# Patient Record
Sex: FEMALE | Race: WHITE | NOT HISPANIC OR LATINO | Employment: UNEMPLOYED | URBAN - NONMETROPOLITAN AREA
[De-identification: names, ages, dates, MRNs, and addresses within clinical notes are randomized per-mention and may not be internally consistent; named-entity substitution may affect disease eponyms.]

---

## 2022-11-29 ENCOUNTER — OFFICE VISIT (OUTPATIENT)
Dept: FAMILY MEDICINE CLINIC | Facility: CLINIC | Age: 62
End: 2022-11-29

## 2022-11-29 VITALS
OXYGEN SATURATION: 98 % | RESPIRATION RATE: 16 BRPM | WEIGHT: 235.6 LBS | DIASTOLIC BLOOD PRESSURE: 98 MMHG | HEIGHT: 61 IN | TEMPERATURE: 96.6 F | SYSTOLIC BLOOD PRESSURE: 154 MMHG | HEART RATE: 105 BPM | BODY MASS INDEX: 44.48 KG/M2

## 2022-11-29 DIAGNOSIS — Z00.01 ENCOUNTER FOR GENERAL ADULT MEDICAL EXAMINATION WITH ABNORMAL FINDINGS: ICD-10-CM

## 2022-11-29 DIAGNOSIS — E55.9 VITAMIN D DEFICIENCY: ICD-10-CM

## 2022-11-29 DIAGNOSIS — E78.2 MIXED HYPERLIPIDEMIA: ICD-10-CM

## 2022-11-29 DIAGNOSIS — R76.8 ELEVATED ANTINUCLEAR ANTIBODY (ANA) LEVEL: ICD-10-CM

## 2022-11-29 DIAGNOSIS — E11.65 TYPE 2 DIABETES MELLITUS WITH HYPERGLYCEMIA, WITHOUT LONG-TERM CURRENT USE OF INSULIN: Primary | ICD-10-CM

## 2022-11-29 DIAGNOSIS — E66.01 CLASS 3 SEVERE OBESITY DUE TO EXCESS CALORIES WITH SERIOUS COMORBIDITY AND BODY MASS INDEX (BMI) OF 40.0 TO 44.9 IN ADULT: ICD-10-CM

## 2022-11-29 DIAGNOSIS — M13.0 POLYARTHRITIS: ICD-10-CM

## 2022-11-29 DIAGNOSIS — Z12.31 ENCOUNTER FOR SCREENING MAMMOGRAM FOR MALIGNANT NEOPLASM OF BREAST: ICD-10-CM

## 2022-11-29 DIAGNOSIS — I10 PRIMARY HYPERTENSION: ICD-10-CM

## 2022-11-29 DIAGNOSIS — Z12.11 ENCOUNTER FOR SCREENING FOR MALIGNANT NEOPLASM OF COLON: ICD-10-CM

## 2022-11-29 PROBLEM — E11.21 DIABETIC NEPHROPATHY: Status: ACTIVE | Noted: 2020-12-16

## 2022-11-29 LAB
EXPIRATION DATE: NORMAL
HBA1C MFR BLD: 12.6 %
Lab: NORMAL

## 2022-11-29 PROCEDURE — 83036 HEMOGLOBIN GLYCOSYLATED A1C: CPT | Performed by: NURSE PRACTITIONER

## 2022-11-29 PROCEDURE — 99386 PREV VISIT NEW AGE 40-64: CPT | Performed by: NURSE PRACTITIONER

## 2022-11-29 RX ORDER — LOSARTAN POTASSIUM 25 MG/1
TABLET ORAL
COMMUNITY
Start: 2020-12-04 | End: 2022-11-29 | Stop reason: SDUPTHER

## 2022-11-29 RX ORDER — SIMVASTATIN 10 MG
TABLET ORAL
COMMUNITY
Start: 2020-12-04 | End: 2022-11-29 | Stop reason: SDUPTHER

## 2022-11-29 RX ORDER — LOSARTAN POTASSIUM 25 MG/1
25 TABLET ORAL DAILY
Qty: 30 TABLET | Refills: 2 | Status: SHIPPED | OUTPATIENT
Start: 2022-11-29 | End: 2022-12-21

## 2022-11-29 RX ORDER — SIMVASTATIN 10 MG
10 TABLET ORAL NIGHTLY
Qty: 30 TABLET | Refills: 2 | Status: SHIPPED | OUTPATIENT
Start: 2022-11-29 | End: 2022-12-01

## 2022-11-29 NOTE — PATIENT INSTRUCTIONS
Preventive Care 40-64 Years Old, Female  Preventive care refers to lifestyle choices and visits with your health care provider that can promote health and wellness. Preventive care visits are also called wellness exams.  What can I expect for my preventive care visit?  Counseling  Your health care provider may ask you questions about your:  Medical history, including:  Past medical problems.  Family medical history.  Pregnancy history.  Current health, including:  Menstrual cycle.  Method of birth control.  Emotional well-being.  Home life and relationship well-being.  Sexual activity and sexual health.  Lifestyle, including:  Alcohol, nicotine or tobacco, and drug use.  Access to firearms.  Diet, exercise, and sleep habits.  Work and work environment.  Sunscreen use.  Safety issues such as seatbelt and bike helmet use.  Physical exam  Your health care provider will check your:  Height and weight. These may be used to calculate your BMI (body mass index). BMI is a measurement that tells if you are at a healthy weight.  Waist circumference. This measures the distance around your waistline. This measurement also tells if you are at a healthy weight and may help predict your risk of certain diseases, such as type 2 diabetes and high blood pressure.  Heart rate and blood pressure.  Body temperature.  Skin for abnormal spots.  What immunizations do I need?  A person receiving a vaccination in the upper arm.    Vaccines are usually given at various ages, according to a schedule. Your health care provider will recommend vaccines for you based on your age, medical history, and lifestyle or other factors, such as travel or where you work.  What tests do I need?  Screening  Your health care provider may recommend screening tests for certain conditions. This may include:  Lipid and cholesterol levels.  Diabetes screening. This is done by checking your blood sugar (glucose) after you have not eaten for a while  (fasting).  Pelvic exam and Pap test.  Hepatitis B test.  Hepatitis C test.  HIV (human immunodeficiency virus) test.  STI (sexually transmitted infection) testing, if you are at risk.  Lung cancer screening.  Colorectal cancer screening.  Mammogram. Talk with your health care provider about when you should start having regular mammograms. This may depend on whether you have a family history of breast cancer.  BRCA-related cancer screening. This may be done if you have a family history of breast, ovarian, tubal, or peritoneal cancers.  Bone density scan. This is done to screen for osteoporosis.  Talk with your health care provider about your test results, treatment options, and if necessary, the need for more tests.  Follow these instructions at home:  Eating and drinking  A plate with healthy, colorful foods.    Eat a diet that includes fresh fruits and vegetables, whole grains, lean protein, and low-fat dairy products.  Take vitamin and mineral supplements as recommended by your health care provider.  Do not drink alcohol if:  Your health care provider tells you not to drink.  You are pregnant, may be pregnant, or are planning to become pregnant.  If you drink alcohol:  Limit how much you have to 0-1 drink a day.  Know how much alcohol is in your drink. In the [place] one drink equals one 12 oz bottle of beer (355 mL), one 5 oz glass of wine (148 mL), or one 1½ oz glass of hard liquor (44 mL).  Lifestyle  Brush your teeth every morning and night with fluoride toothpaste. Floss one time each day.  Exercise for at least 30 minutes 5 or more days each week.  Do not use any products that contain nicotine or tobacco. These products include cigarettes, chewing tobacco, and vaping devices, such as e-cigarettes. If you need help quitting, ask your health care provider.  Do not use drugs.  If you are sexually active, practice safe sex. Use a condom or other form of protection to prevent STIs.  If you do not wish to become  pregnant, use a form of birth control. If you plan to become pregnant, see your health care provider for a prepregnancy visit.  Take aspirin only as told by your health care provider. Make sure that you understand how much to take and what form to take. Work with your health care provider to find out whether it is safe and beneficial for you to take aspirin daily.  Find healthy ways to manage stress, such as:  Meditation, yoga, or listening to music.  Journaling.  Talking to a trusted person.  Spending time with friends and family.  Minimize exposure to UV radiation to reduce your risk of skin cancer.  Safety  Always wear your seat belt while driving or riding in a vehicle.  Do not drive:  If you have been drinking alcohol. Do not ride with someone who has been drinking.  When you are tired or distracted.  While texting.  If you have been using any mind-altering substances or drugs.  Wear a helmet and other protective equipment during sports activities.  If you have firearms in your house, make sure you follow all gun safety procedures.  Seek help if you have been physically or sexually abused.  What's next?  Visit your health care provider once a year for an annual wellness visit.  Ask your health care provider how often you should have your eyes and teeth checked.  Stay up to date on all vaccines.  This information is not intended to replace advice given to you by your health care provider. Make sure you discuss any questions you have with your health care provider.  Document Revised: 06/15/2022 Document Reviewed: 06/15/2022  [employer] Patient Education © 2022 [employer]

## 2022-11-29 NOTE — PROGRESS NOTES
"       New Patient History and Physical      Referring Physician: No ref. provider found    Chief Complaint:    Chief Complaint   Patient presents with   • Med Refill     ESTABLISH CARE    • Hypertension       History of Present Illness:   Silva Maldonado is a 62 y.o. female who presents today as a new patient. Patient moved to [facility] at the beginning of year and has been needing to take time to establish a new PCP. Previously lived in [place]. Patient has a history of HTN, HLD, Type 2 DM. Patient has not had any of her medications in over 8 months now. Has not been checking her glucose at home either. Patient states that she has had a history of diverticulitis but it has been some years now. Patient has history of \"tennis elbow\", swelling in hands/fingers from repetitive crafting (sewing, crocheting). Patient reports that she has had increased fatigue lately. Reports that both of her daughters have been diagnosed with auto-immune disorders and that she has never been checked for anything. One daughter has a history of Celiac and other daughter has Sjogren's.     HTN--Losartan 25mg  HLD--Simvastatin 10mg  Type 2 DM--Januvia 50mg      Subjective     Review of Systems   Constitutional: Positive for fatigue.   Cardiovascular: Negative for chest pain, palpitations and leg swelling.   Gastrointestinal: Negative for constipation, diarrhea, nausea and vomiting.   Endocrine: Positive for polyuria. Negative for polydipsia.   Musculoskeletal: Positive for arthralgias and joint swelling.   Psychiatric/Behavioral: Positive for sleep disturbance.   All other systems reviewed and are negative.      The following portions of the patient's history were reviewed and updated as appropriate: allergies, current medications, past family history, past medical history, past social history, past surgical history and problem list.    Past Medical History:   Past Medical History:   Diagnosis Date   • Cholelithiasis     Removed in " "1996   • Colon polyp 12/2019    Recheck i 3 years   • Diabetes mellitus (HCC) 2017   • Diverticulosis 2007   • Obesity        Past Surgical History:  Past Surgical History:   Procedure Laterality Date   • CHOLECYSTECTOMY  1998   • TONSILLECTOMY  1983       Family History: family history includes Arthritis in her brother; Cancer in her brother, father, and mother; Diabetes in her brother; Heart disease in her brother and brother; Hyperlipidemia in her brother, brother, brother, brother, father, and mother; Kidney disease in her brother; Other in her daughter and daughter.    Social History:  reports that she quit smoking about 10 years ago. Her smoking use included cigarettes. She started smoking about 36 years ago. She has a 22.50 pack-year smoking history. She has never used smokeless tobacco. She reports that she does not drink alcohol and does not use drugs.    Medications:    Current Outpatient Medications:   •  losartan (COZAAR) 25 MG tablet, Take 1 tablet by mouth Daily., Disp: 30 tablet, Rfl: 2  •  simvastatin (ZOCOR) 10 MG tablet, Take 1 tablet by mouth Every Night., Disp: 30 tablet, Rfl: 2  •  SITagliptin (JANUVIA) 50 MG tablet, Take 1 tablet by mouth Daily., Disp: 30 tablet, Rfl: 2  •  Blood Glucose Monitoring Suppl kit, 1 each 4 (Four) Times a Day., Disp: 1 each, Rfl: 0  •  glucose blood (Glucose Meter Test) test strip, Use as instructed, Disp: 200 each, Rfl: 12    Allergies:  No Known Allergies    Objective     Vital Signs:   /98   Pulse 105   Temp 96.6 °F (35.9 °C)   Resp 16   Ht 154.9 cm (61\")   Wt 107 kg (235 lb 9.6 oz)   SpO2 98%   BMI 44.52 kg/m²        Physical Exam:  Physical Exam  Vitals and nursing note reviewed.   Constitutional:       Appearance: She is obese.   HENT:      Head: Normocephalic.      Right Ear: Tympanic membrane normal.      Left Ear: Tympanic membrane normal.      Nose: Nose normal.      Mouth/Throat:      Mouth: Mucous membranes are moist.   Eyes:      Pupils: " Pupils are equal, round, and reactive to light.   Cardiovascular:      Rate and Rhythm: Normal rate.      Heart sounds: Normal heart sounds.   Pulmonary:      Effort: Pulmonary effort is normal.      Breath sounds: Normal breath sounds.   Abdominal:      General: Bowel sounds are normal.   Musculoskeletal:         General: Normal range of motion.   Skin:     General: Skin is warm and dry.   Neurological:      Mental Status: She is alert and oriented to person, place, and time.   Psychiatric:         Mood and Affect: Mood normal.         Behavior: Behavior normal.       Assessment / Plan     Assessment/Plan:   Diagnoses and all orders for this visit:    1. Type 2 diabetes mellitus with hyperglycemia, without long-term current use of insulin (HCC) (Primary)  -     POC Glycosylated Hemoglobin (Hb A1C)  -     SITagliptin (JANUVIA) 50 MG tablet; Take 1 tablet by mouth Daily.  Dispense: 30 tablet; Refill: 2  -     glucose blood (Glucose Meter Test) test strip; Use as instructed  Dispense: 200 each; Refill: 12  -     Blood Glucose Monitoring Suppl kit; 1 each 4 (Four) Times a Day.  Dispense: 1 each; Refill: 0    2. Primary hypertension  -     losartan (COZAAR) 25 MG tablet; Take 1 tablet by mouth Daily.  Dispense: 30 tablet; Refill: 2    3. Mixed hyperlipidemia  -     Cancel: Lipid panel; Future  -     simvastatin (ZOCOR) 10 MG tablet; Take 1 tablet by mouth Every Night.  Dispense: 30 tablet; Refill: 2  -     Lipid panel    4. Polyarthritis  -     Sedimentation Rate  -     C-reactive protein  -     YEE With / DsDNA, RNP, Sjogrens A / B, Triana    5. Encounter for screening mammogram for malignant neoplasm of breast  -     Mammo Screening Digital Tomosynthesis Bilateral With CAD; Future    6. Encounter for screening for malignant neoplasm of colon  -     Ambulatory Referral For Screening Colonoscopy    7. Encounter for general adult medical examination with abnormal findings  -     Cancel: Comprehensive metabolic panel;  Future  -     Cancel: Lipid panel; Future  -     Cancel: TSH; Future  -     Cancel: T4, free; Future  -     T3, free  -     Comprehensive metabolic panel  -     Lipid panel  -     T4, free  -     TSH    8. Vitamin D deficiency  -     Cancel: Vitamin D 25 hydroxy; Future  -     Vitamin D 25 hydroxy    9. Class 3 severe obesity due to excess calories with serious comorbidity and body mass index (BMI) of 40.0 to 44.9 in adult (HCC)      Discussion Summary:  Discussed plan of care in detail with pt today; pt verb understanding and agrees.    Follow up:  Return in 3 months (on 2/28/2023).     Patient Education:  Patient Instructions        Preventive Care 40-64 Years Old, Female  Preventive care refers to lifestyle choices and visits with your health care provider that can promote health and wellness. Preventive care visits are also called wellness exams.  What can I expect for my preventive care visit?  Counseling  Your health care provider may ask you questions about your:  Medical history, including:  Past medical problems.  Family medical history.  Pregnancy history.  Current health, including:  Menstrual cycle.  Method of birth control.  Emotional well-being.  Home life and relationship well-being.  Sexual activity and sexual health.  Lifestyle, including:  Alcohol, nicotine or tobacco, and drug use.  Access to firearms.  Diet, exercise, and sleep habits.  Work and work environment.  Sunscreen use.  Safety issues such as seatbelt and bike helmet use.  Physical exam  Your health care provider will check your:  Height and weight. These may be used to calculate your BMI (body mass index). BMI is a measurement that tells if you are at a healthy weight.  Waist circumference. This measures the distance around your waistline. This measurement also tells if you are at a healthy weight and may help predict your risk of certain diseases, such as type 2 diabetes and high blood pressure.  Heart rate and blood pressure.  Body  temperature.  Skin for abnormal spots.  What immunizations do I need?  A person receiving a vaccination in the upper arm.    Vaccines are usually given at various ages, according to a schedule. Your health care provider will recommend vaccines for you based on your age, medical history, and lifestyle or other factors, such as travel or where you work.  What tests do I need?  Screening  Your health care provider may recommend screening tests for certain conditions. This may include:  Lipid and cholesterol levels.  Diabetes screening. This is done by checking your blood sugar (glucose) after you have not eaten for a while (fasting).  Pelvic exam and Pap test.  Hepatitis B test.  Hepatitis C test.  HIV (human immunodeficiency virus) test.  STI (sexually transmitted infection) testing, if you are at risk.  Lung cancer screening.  Colorectal cancer screening.  Mammogram. Talk with your health care provider about when you should start having regular mammograms. This may depend on whether you have a family history of breast cancer.  BRCA-related cancer screening. This may be done if you have a family history of breast, ovarian, tubal, or peritoneal cancers.  Bone density scan. This is done to screen for osteoporosis.  Talk with your health care provider about your test results, treatment options, and if necessary, the need for more tests.  Follow these instructions at home:  Eating and drinking  A plate with healthy, colorful foods.    Eat a diet that includes fresh fruits and vegetables, whole grains, lean protein, and low-fat dairy products.  Take vitamin and mineral supplements as recommended by your health care provider.  Do not drink alcohol if:  Your health care provider tells you not to drink.  You are pregnant, may be pregnant, or are planning to become pregnant.  If you drink alcohol:  Limit how much you have to 0-1 drink a day.  Know how much alcohol is in your drink. In the [place] one drink equals one 12 oz  bottle of beer (355 mL), one 5 oz glass of wine (148 mL), or one 1½ oz glass of hard liquor (44 mL).  Lifestyle  Brush your teeth every morning and night with fluoride toothpaste. Floss one time each day.  Exercise for at least 30 minutes 5 or more days each week.  Do not use any products that contain nicotine or tobacco. These products include cigarettes, chewing tobacco, and vaping devices, such as e-cigarettes. If you need help quitting, ask your health care provider.  Do not use drugs.  If you are sexually active, practice safe sex. Use a condom or other form of protection to prevent STIs.  If you do not wish to become pregnant, use a form of birth control. If you plan to become pregnant, see your health care provider for a prepregnancy visit.  Take aspirin only as told by your health care provider. Make sure that you understand how much to take and what form to take. Work with your health care provider to find out whether it is safe and beneficial for you to take aspirin daily.  Find healthy ways to manage stress, such as:  Meditation, yoga, or listening to music.  Journaling.  Talking to a trusted person.  Spending time with friends and family.  Minimize exposure to UV radiation to reduce your risk of skin cancer.  Safety  Always wear your seat belt while driving or riding in a vehicle.  Do not drive:  If you have been drinking alcohol. Do not ride with someone who has been drinking.  When you are tired or distracted.  While texting.  If you have been using any mind-altering substances or drugs.  Wear a helmet and other protective equipment during sports activities.  If you have firearms in your house, make sure you follow all gun safety procedures.  Seek help if you have been physically or sexually abused.  What's next?  Visit your health care provider once a year for an annual wellness visit.  Ask your health care provider how often you should have your eyes and teeth checked.  Stay up to date on all  vaccines.  This information is not intended to replace advice given to you by your health care provider. Make sure you discuss any questions you have with your health care provider.  Document Revised: 06/15/2022 Document Reviewed: 06/15/2022  [employer] Patient Education © 2022 [employer]          ROSY Cruz  11/29/22  10:18 EST    Please note that portions of this note may have been completed with a voice recognition program.

## 2022-11-30 ENCOUNTER — PRIOR AUTHORIZATION (OUTPATIENT)
Dept: FAMILY MEDICINE CLINIC | Facility: CLINIC | Age: 62
End: 2022-11-30

## 2022-11-30 LAB
25(OH)D3+25(OH)D2 SERPL-MCNC: 12.3 NG/ML (ref 30–100)
ALBUMIN SERPL-MCNC: 4.5 G/DL (ref 3.5–5.2)
ALBUMIN/GLOB SERPL: 2.3 G/DL
ALP SERPL-CCNC: 147 U/L (ref 39–117)
ALT SERPL-CCNC: 20 U/L (ref 1–33)
ANA SER QL: POSITIVE
AST SERPL-CCNC: 11 U/L (ref 1–32)
BILIRUB SERPL-MCNC: 0.5 MG/DL (ref 0–1.2)
BUN SERPL-MCNC: 15 MG/DL (ref 8–23)
BUN/CREAT SERPL: 17.6 (ref 7–25)
CALCIUM SERPL-MCNC: 9.5 MG/DL (ref 8.6–10.5)
CENTROMERE B AB SER-ACNC: <0.2 AI (ref 0–0.9)
CHLORIDE SERPL-SCNC: 96 MMOL/L (ref 98–107)
CHOLEST SERPL-MCNC: 216 MG/DL (ref 0–200)
CHROMATIN AB SERPL-ACNC: <0.2 AI (ref 0–0.9)
CO2 SERPL-SCNC: 27 MMOL/L (ref 22–29)
CREAT SERPL-MCNC: 0.85 MG/DL (ref 0.57–1)
CRP SERPL-MCNC: 0.66 MG/DL (ref 0–0.5)
DSDNA AB SER-ACNC: <1 IU/ML (ref 0–9)
EGFRCR SERPLBLD CKD-EPI 2021: 78.5 ML/MIN/1.73
ENA JO1 AB SER-ACNC: <0.2 AI (ref 0–0.9)
ENA RNP AB SER-ACNC: <0.2 AI (ref 0–0.9)
ENA SCL70 AB SER-ACNC: <0.2 AI (ref 0–0.9)
ENA SM AB SER-ACNC: <0.2 AI (ref 0–0.9)
ENA SS-A AB SER-ACNC: 6.8 AI (ref 0–0.9)
ENA SS-B AB SER-ACNC: <0.2 AI (ref 0–0.9)
ERYTHROCYTE [SEDIMENTATION RATE] IN BLOOD BY WESTERGREN METHOD: 3 MM/HR (ref 0–30)
GLOBULIN SER CALC-MCNC: 2 GM/DL
GLUCOSE SERPL-MCNC: 366 MG/DL (ref 65–99)
HDLC SERPL-MCNC: 45 MG/DL (ref 40–60)
LDLC SERPL CALC-MCNC: 128 MG/DL (ref 0–100)
Lab: ABNORMAL
POTASSIUM SERPL-SCNC: 4.9 MMOL/L (ref 3.5–5.2)
PROT SERPL-MCNC: 6.5 G/DL (ref 6–8.5)
SODIUM SERPL-SCNC: 137 MMOL/L (ref 136–145)
T3FREE SERPL-MCNC: 2.9 PG/ML (ref 2–4.4)
T4 FREE SERPL-MCNC: 1.46 NG/DL (ref 0.93–1.7)
TRIGL SERPL-MCNC: 244 MG/DL (ref 0–150)
TSH SERPL DL<=0.005 MIU/L-ACNC: 1.52 UIU/ML (ref 0.27–4.2)
VLDLC SERPL CALC-MCNC: 43 MG/DL (ref 5–40)

## 2022-11-30 NOTE — TELEPHONE ENCOUNTER
A PRIOR AUTHORIZATION HAS BEEN STARTED THROUGH [employer] FOR JANUVIA.    WAITING ON A RESPONSE FROM THE INSURANCE.    Key: [id number]

## 2022-12-01 ENCOUNTER — HOSPITAL ENCOUNTER (OUTPATIENT)
Dept: MAMMOGRAPHY | Facility: HOSPITAL | Age: 62
Discharge: HOME | End: 2022-12-01
Admitting: NURSE PRACTITIONER

## 2022-12-01 DIAGNOSIS — Z12.31 ENCOUNTER FOR SCREENING MAMMOGRAM FOR MALIGNANT NEOPLASM OF BREAST: ICD-10-CM

## 2022-12-01 PROCEDURE — 77063 BREAST TOMOSYNTHESIS BI: CPT

## 2022-12-01 PROCEDURE — 77067 SCR MAMMO BI INCL CAD: CPT

## 2022-12-01 RX ORDER — ERGOCALCIFEROL 1.25 MG/1
50000 CAPSULE ORAL WEEKLY
Qty: 5 CAPSULE | Refills: 0 | Status: SHIPPED | OUTPATIENT
Start: 2022-12-01 | End: 2023-03-07 | Stop reason: SDUPTHER

## 2022-12-01 RX ORDER — SIMVASTATIN 20 MG
20 TABLET ORAL NIGHTLY
Qty: 90 TABLET | Refills: 1 | Status: SHIPPED | OUTPATIENT
Start: 2022-12-01 | End: 2022-12-21

## 2022-12-02 ENCOUNTER — PATIENT ROUNDING (BHMG ONLY) (OUTPATIENT)
Dept: FAMILY MEDICINE CLINIC | Facility: CLINIC | Age: 62
End: 2022-12-02

## 2022-12-16 DIAGNOSIS — E11.65 TYPE 2 DIABETES MELLITUS WITH HYPERGLYCEMIA, WITHOUT LONG-TERM CURRENT USE OF INSULIN: Primary | ICD-10-CM

## 2022-12-16 RX ORDER — SEMAGLUTIDE 1.34 MG/ML
0.25 INJECTION, SOLUTION SUBCUTANEOUS WEEKLY
Qty: 6 ML | Refills: 1 | Status: SHIPPED | OUTPATIENT
Start: 2022-12-16 | End: 2023-02-28 | Stop reason: SDUPTHER

## 2022-12-19 ENCOUNTER — PRIOR AUTHORIZATION (OUTPATIENT)
Dept: FAMILY MEDICINE CLINIC | Facility: CLINIC | Age: 62
End: 2022-12-19

## 2022-12-19 NOTE — TELEPHONE ENCOUNTER
A PRIOR AUTH HAS BEEN STARTED THROUGH [employer] FOR OZEMPIC.    WAITING ON A RESPONSE FROM THE INSURANCE.    Key: [id number]

## 2022-12-21 ENCOUNTER — HOSPITAL ENCOUNTER (OUTPATIENT)
Dept: GENERAL RADIOLOGY | Facility: HOSPITAL | Age: 62
Discharge: HOME | End: 2022-12-21
Admitting: NURSE PRACTITIONER

## 2022-12-21 ENCOUNTER — ANCILLARY ORDERS (OUTPATIENT)
Dept: URGENT CARE | Facility: CLINIC | Age: 62
End: 2022-12-21

## 2022-12-21 DIAGNOSIS — S82.142A CLOSED FRACTURE OF LEFT TIBIAL PLATEAU, INITIAL ENCOUNTER: Primary | ICD-10-CM

## 2022-12-21 DIAGNOSIS — S89.92XA KNEE INJURY, LEFT, INITIAL ENCOUNTER: ICD-10-CM

## 2022-12-21 DIAGNOSIS — S49.90XA SHOULDER INJURY, INITIAL ENCOUNTER: ICD-10-CM

## 2022-12-21 PROCEDURE — 73562 X-RAY EXAM OF KNEE 3: CPT

## 2022-12-21 PROCEDURE — 73030 X-RAY EXAM OF SHOULDER: CPT

## 2022-12-29 ENCOUNTER — OFFICE VISIT (OUTPATIENT)
Dept: FAMILY MEDICINE CLINIC | Facility: CLINIC | Age: 62
End: 2022-12-29
Payer: COMMERCIAL

## 2022-12-29 VITALS
HEIGHT: 61 IN | WEIGHT: 237.6 LBS | RESPIRATION RATE: 16 BRPM | DIASTOLIC BLOOD PRESSURE: 90 MMHG | HEART RATE: 101 BPM | OXYGEN SATURATION: 98 % | TEMPERATURE: 97.2 F | SYSTOLIC BLOOD PRESSURE: 138 MMHG | BODY MASS INDEX: 44.86 KG/M2

## 2022-12-29 DIAGNOSIS — M25.512 ACUTE PAIN OF LEFT SHOULDER: ICD-10-CM

## 2022-12-29 DIAGNOSIS — M25.522 LEFT ELBOW PAIN: ICD-10-CM
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
## 2022-12-29 DIAGNOSIS — M25.612 DECREASED ROM OF LEFT SHOULDER: ICD-10-CM

## 2022-12-29 DIAGNOSIS — M25.362 KNEE INSTABILITY, LEFT: ICD-10-CM

## 2022-12-29 DIAGNOSIS — M25.562 ACUTE PAIN OF LEFT KNEE: Primary | ICD-10-CM

## 2022-12-29 PROCEDURE — 1160F RVW MEDS BY RX/DR IN RCRD: CPT | Performed by: NURSE PRACTITIONER

## 2022-12-29 PROCEDURE — 1159F MED LIST DOCD IN RCRD: CPT | Performed by: NURSE PRACTITIONER

## 2022-12-29 PROCEDURE — 99213 OFFICE O/P EST LOW 20 MIN: CPT | Performed by: NURSE PRACTITIONER

## 2022-12-29 NOTE — PROGRESS NOTES
Established Patient        Chief Complaint:   Chief Complaint   Patient presents with   • Shoulder Pain   • Knee Pain         History of Present Illness:    Silva Maldonado is a 62 y.o. female who presents today for complaints of left shoulder, elbow, knee pain. Patient fell on 12/21/22 and went to . Was sent to ED for Xray. Patient states that she had xray of left knee and shoulder but not of elbow. Xray of knee shows questionable tib plateau fracture and recommends CT confirmation. Patient reports pain and instability when walking.     Subjective     The following portions of the patient's history were reviewed and updated as appropriate: allergies, current medications, past family history, past medical history, past social history, past surgical history and problem list.    ALLERGIES  No Known Allergies    ROS  Review of Systems   Constitutional: Negative for fatigue.   HENT: Negative for congestion.    Respiratory: Negative for cough.    Cardiovascular: Negative for chest pain, palpitations and leg swelling.   Musculoskeletal: Positive for arthralgias (left knee, elbow, shoulder), gait problem and joint swelling (left knee and elbow).   Neurological: Negative for weakness and headaches.   All other systems reviewed and are negative.      Objective     Vital Signs:   /90   Pulse 101   Temp 97.2 °F (36.2 °C)   Resp 16   Ht 154.9 cm (61\")   Wt 108 kg (237 lb 9.6 oz)   SpO2 98%   BMI 44.89 kg/m²     Physical Exam   Physical Exam  Vitals and nursing note reviewed.   HENT:      Head: Normocephalic.      Nose: Nose normal.      Mouth/Throat:      Lips: Pink.   Eyes:      General: Lids are normal.      Conjunctiva/sclera: Conjunctivae normal.      Pupils: Pupils are equal, round, and reactive to light.   Cardiovascular:      Rate and Rhythm: Normal rate.      Heart sounds: Normal heart sounds.   Pulmonary:      Effort: Pulmonary effort is normal.      Breath sounds: Normal breath  sounds.   Abdominal:      General: Bowel sounds are normal.   Musculoskeletal:         General: Normal range of motion.      Left elbow: No swelling. Normal range of motion. Tenderness present.      Left knee: Swelling present. No deformity. Normal range of motion. Tenderness present.   Skin:     General: Skin is warm and dry.   Neurological:      Mental Status: She is alert and oriented to person, place, and time.      Gait: Gait is intact.   Psychiatric:         Attention and Perception: Attention normal.         Mood and Affect: Mood and affect normal.         Speech: Speech normal.         Behavior: Behavior normal. Behavior is cooperative.         Assessment and Plan      Assessment/Plan:   Diagnoses and all orders for this visit:    1. Acute pain of left knee (Primary)  -     Cancel: CT Angiogram Lower Extremity Left; Future  -     CT lower extremity left wo contrast; Future  -     CT lower extremity left wo contrast    2. Fall, subsequent encounter  -     Cancel: CT Angiogram Lower Extremity Left; Future    3. Acute pain of left shoulder  -     MRI Shoulder Left Without Contrast; Future    4. Left elbow pain  -     XR elbow 3+ vw left; Future  -     XR elbow 3+ vw left    5. Knee instability, left  -     Cancel: CT Angiogram Lower Extremity Left; Future    6. Decreased ROM of left shoulder  -     MRI Shoulder Left Without Contrast; Future        Discussion Summary:  Discussed plan of care in detail with pt today; pt verb understanding and agrees.    Follow up:  Return if symptoms worsen or fail to improve.     Patient Education:  There are no Patient Instructions on file for this visit.    ROSY Cruz  01/04/23  14:50 EST          Please note that portions of this note may have been completed with a voice recognition program.   Answers for HPI/ROS submitted by the patient on 12/28/2022  Please describe your symptoms.: Shoulder pain  Have you had these symptoms before?: No  How long have you been  having these symptoms?: 1-2 weeks  Please list any medications you are currently taking for this condition.: None  Please describe any probable cause for these symptoms. : Tripped and fell on 12/23. Landed on left shoulder and knee  What is the primary reason for your visit?: Other

## 2023-02-28 ENCOUNTER — OFFICE VISIT (OUTPATIENT)
Dept: FAMILY MEDICINE CLINIC | Facility: CLINIC | Age: 63
End: 2023-02-28
Payer: COMMERCIAL

## 2023-02-28 VITALS
OXYGEN SATURATION: 98 % | DIASTOLIC BLOOD PRESSURE: 90 MMHG | HEART RATE: 100 BPM | BODY MASS INDEX: 45.05 KG/M2 | WEIGHT: 238.6 LBS | HEIGHT: 61 IN | SYSTOLIC BLOOD PRESSURE: 142 MMHG | RESPIRATION RATE: 16 BRPM | TEMPERATURE: 97.6 F

## 2023-02-28 DIAGNOSIS — E11.65 TYPE 2 DIABETES MELLITUS WITH HYPERGLYCEMIA, WITHOUT LONG-TERM CURRENT USE OF INSULIN: ICD-10-CM

## 2023-02-28 DIAGNOSIS — I10 PRIMARY HYPERTENSION: Primary | ICD-10-CM

## 2023-02-28 DIAGNOSIS — E55.9 VITAMIN D DEFICIENCY: ICD-10-CM

## 2023-02-28 DIAGNOSIS — I87.2 VENOUS INSUFFICIENCY: ICD-10-CM

## 2023-02-28 PROBLEM — R76.8 POSITIVE ANTINUCLEAR ANTIBODY: Status: ACTIVE | Noted: 2023-02-28

## 2023-02-28 LAB
EXPIRATION DATE: NORMAL
HBA1C MFR BLD: 10.9 %
Lab: NORMAL

## 2023-02-28 PROCEDURE — 83036 HEMOGLOBIN GLYCOSYLATED A1C: CPT | Performed by: NURSE PRACTITIONER

## 2023-02-28 PROCEDURE — 99214 OFFICE O/P EST MOD 30 MIN: CPT | Performed by: NURSE PRACTITIONER

## 2023-02-28 RX ORDER — SEMAGLUTIDE 1.34 MG/ML
0.5 INJECTION, SOLUTION SUBCUTANEOUS WEEKLY
Qty: 6 ML | Refills: 1 | Status: SHIPPED | OUTPATIENT
Start: 2023-02-28 | End: 2023-03-28 | Stop reason: SDUPTHER

## 2023-03-01 LAB — 25(OH)D3+25(OH)D2 SERPL-MCNC: 17.6 NG/ML (ref 30–100)

## 2023-03-02 ENCOUNTER — OFFICE VISIT (OUTPATIENT)
Dept: GASTROENTEROLOGY | Facility: CLINIC | Age: 63
End: 2023-03-02
Payer: COMMERCIAL

## 2023-03-02 ENCOUNTER — PATIENT ROUNDING (BHMG ONLY) (OUTPATIENT)
Dept: GASTROENTEROLOGY | Facility: CLINIC | Age: 63
End: 2023-03-02
Payer: COMMERCIAL

## 2023-03-02 VITALS
DIASTOLIC BLOOD PRESSURE: 94 MMHG | HEIGHT: 61 IN | HEART RATE: 94 BPM | TEMPERATURE: 98.2 F | WEIGHT: 239 LBS | SYSTOLIC BLOOD PRESSURE: 132 MMHG | OXYGEN SATURATION: 98 % | BODY MASS INDEX: 45.12 KG/M2

## 2023-03-02 DIAGNOSIS — E66.01 CLASS 3 SEVERE OBESITY DUE TO EXCESS CALORIES WITH SERIOUS COMORBIDITY AND BODY MASS INDEX (BMI) OF 45.0 TO 49.9 IN ADULT: Chronic | ICD-10-CM

## 2023-03-02 DIAGNOSIS — Z86.010 PERSONAL HISTORY OF COLONIC POLYPS: Primary | ICD-10-CM

## 2023-03-02 PROBLEM — E66.813 CLASS 3 SEVERE OBESITY DUE TO EXCESS CALORIES WITH SERIOUS COMORBIDITY AND BODY MASS INDEX (BMI) OF 45.0 TO 49.9 IN ADULT: Chronic | Status: ACTIVE | Noted: 2023-03-02

## 2023-03-02 PROCEDURE — 99212 OFFICE O/P EST SF 10 MIN: CPT | Performed by: NURSE PRACTITIONER

## 2023-03-02 RX ORDER — SODIUM, POTASSIUM,MAG SULFATES 17.5-3.13G
SOLUTION, RECONSTITUTED, ORAL ORAL
Qty: 177 ML | Refills: 0 | Status: SHIPPED | OUTPATIENT
Start: 2023-03-02

## 2023-03-02 RX ORDER — SODIUM CHLORIDE 9 MG/ML
70 INJECTION, SOLUTION INTRAVENOUS CONTINUOUS PRN
OUTPATIENT
Start: 2023-03-02

## 2023-03-02 NOTE — PROGRESS NOTES
New Patient Consult      Date: 2023   Patient Name: Silva Maldonado  MRN: [id number]  : 1960     Primary Care Provider: Sharmila Oconnell APRN    Chief Complaint   Patient presents with   • Colonoscopy     History of Present Illness: Silva Maldonado is a 62 y.o. female who is here today to establish care with gastroenterology for colon cancer screening.     The patient denies recent change in bowel habits. There is no diarrhea or constipation. There is no history of abdominal pain. There is no history of overt GI bleed (hematemesis melena or hematochezia). The patient denies nausea or vomiting. There is no history of reflux. The patient denies dysphagia or odynophagia. There is no history of recent significant weight loss. There is no history of liver disease in the past. There is no family history of GI malignancy. The patient's last colonoscopy was in 2019 with polyp removed. She was recommended colonoscopy in 3 years for surveillance.     Subjective      Past Medical History:   Diagnosis Date   • Cholelithiasis     Removed in    • Colon polyp 2019    Recheck i 3 years   • Diabetes mellitus (HCC) 2017   • Diverticulosis    • Obesity      Past Surgical History:   Procedure Laterality Date   • CHOLECYSTECTOMY     • COLONOSCOPY  2019   • TONSILLECTOMY       Family History   Problem Relation Age of Onset   • Cancer Mother    • Hyperlipidemia Mother    • Cancer Father    • Hyperlipidemia Father    • Arthritis Brother    • Cancer Brother    • Heart disease Brother    • Hyperlipidemia Brother    • Diabetes Brother    • Hyperlipidemia Brother    • Kidney disease Brother    • Heart disease Brother    • Hyperlipidemia Brother    • Hyperlipidemia Brother    • Other Daughter         Celiac, fibromialgia   • Other Daughter         Sjorgen disease, mixed connective disorder, hyper mobility   • Colon cancer Neg Hx      Social History     Socioeconomic History   • Marital status:     Tobacco Use   • Smoking status: Former     Packs/day: 1.50     Years: 15.00     Pack years: 22.50     Types: Cigarettes     Start date: 1986     Quit date: 2012     Years since quittin.1     Passive exposure: Never   • Smokeless tobacco: Never   Substance and Sexual Activity   • Alcohol use: Never   • Drug use: Never   • Sexual activity: Yes     Partners: Male     Birth control/protection: Post-menopausal, Tubal ligation       Current Outpatient Medications:   •  Blood Glucose Monitoring Suppl kit, 1 each 4 (Four) Times a Day., Disp: 1 each, Rfl: 0  •  glucose blood (Glucose Meter Test) test strip, Use as instructed, Disp: 200 each, Rfl: 12  •  losartan (COZAAR) 25 MG tablet, Take 1 tablet by mouth Daily., Disp: , Rfl:   •  Semaglutide,0.25 or 0.5MG/DOS, (Ozempic, 0.25 or 0.5 MG/DOSE,) 2 MG/1.5ML solution pen-injector, Inject 0.5 mg under the skin into the appropriate area as directed 1 (One) Time Per Week., Disp: 6 mL, Rfl: 1  •  simvastatin (ZOCOR) 20 MG tablet, Take 1 tablet by mouth Every Evening., Disp: , Rfl:   •  vitamin D (ERGOCALCIFEROL) 1.25 MG (27862 UT) capsule capsule, Take 1 capsule by mouth 1 (One) Time Per Week., Disp: 5 capsule, Rfl: 0  •  sodium-potassium-magnesium sulfates (Suprep Bowel Prep Kit) 17.5-3.13-1.6 GM/177ML solution oral solution, Use as directed for colonoscopy prep. Patient has instructions., Disp: 177 mL, Rfl: 0     No Known Allergies     The following portions of the patient's history were reviewed and updated as appropriate: allergies, current medications, past family history, past medical history, past social history, past surgical history and problem list.    Objective     Physical Exam  Vitals and nursing note reviewed.   Constitutional:       General: She is not in acute distress.     Appearance: Normal appearance. She is well-developed.   HENT:      Head: Normocephalic and atraumatic.      Mouth/Throat:      Mouth: Mucous membranes are not pale, not dry  "and not cyanotic.   Eyes:      General: Lids are normal.   Neck:      Trachea: Trachea normal.   Cardiovascular:      Rate and Rhythm: Normal rate.   Pulmonary:      Effort: Pulmonary effort is normal. No respiratory distress.      Breath sounds: Normal breath sounds.   Abdominal:      General: Bowel sounds are normal.      Palpations: Abdomen is soft. There is no mass.      Tenderness: There is no abdominal tenderness.      Hernia: No hernia is present.   Skin:     General: Skin is warm and dry.   Neurological:      Mental Status: She is alert and oriented to person, place, and time.   Psychiatric:         Mood and Affect: Mood normal.         Speech: Speech normal.         Behavior: Behavior normal. Behavior is cooperative.       Vitals:    03/02/23 0947   BP: 132/94   Pulse: 94   Temp: 98.2 °F (36.8 °C)   SpO2: 98%   Weight: 108 kg (239 lb)   Height: 154.9 cm (61\")     Body mass index is 45.16 kg/m².     Results Review:   I have reviewed the patient's new clinical and imaging results.    Office Visit on 02/28/2023   Component Date Value Ref Range Status   • Hemoglobin A1C 02/28/2023 10.9  % Final   • Lot Number 02/28/2023 [id number]   Final   • Expiration Date 02/28/2023 11/3/2024   Final   • 25 Hydroxy, Vitamin D 02/28/2023 17.6 (L)  30.0 - 100.0 ng/ml Final    Comment: Reference Range for Total Vitamin D 25(OH)  Deficiency <20.0 ng/mL  Insufficiency 21-29 ng/mL  Sufficiency  ng/mL  Toxicity >100 ng/ml        Assessment / Plan      1. Personal history of colonic polyps  Colonoscopy in December 2019 with polyp removed, tubular adenoma without dysplasia. She was recommended colonoscopy in 3 years at that time. There is no family history of colon cancer.   Colonoscopy for surveillance.     - Case Request    2. Class 3 severe obesity due to excess calories with serious comorbidity and body mass index (BMI) of 45.0 to 49.9 in adult (HCC)  BMI 45.16  Labs in November 2022 with mild elevation of alk phos, <2x " ULN. Normal AST/ALT/total bilirubin. No recent imaging but CTAP in 2018 with liver unremarkable.   Advised low fat diet, exercise and weight reduction.   Check GGT if alk phos continues to be elevated. If elevated, needs further liver evaluation.     Patient Instructions   1. High fiber, low fat diet with liberal water intake.   2. Advised to exercise 30 minutes 4-5 days per week.   3. Advised to lose 20-25 pounds in the next 6-12 months.   4. Colonoscopy: The indications, technique, alternatives and potential risk and complications were discussed with the patient including but not limited to bleeding, perforations, missing lesions and anesthetic complications. The patient understands and wishes to proceed with the procedure and has given their verbal consent. Written patient education information was given to the patient.   5. The patient will call if they have further questions before procedure.       Ginger San, APRN  3/2/2023    Please note that portions of this note may have been completed with a voice recognition program.

## 2023-03-02 NOTE — PATIENT INSTRUCTIONS
High fiber, low fat diet with liberal water intake.   Advised to exercise 30 minutes 4-5 days per week.   Advised to lose 20-25 pounds in the next 6-12 months.   Colonoscopy: The indications, technique, alternatives and potential risk and complications were discussed with the patient including but not limited to bleeding, perforations, missing lesions and anesthetic complications. The patient understands and wishes to proceed with the procedure and has given their verbal consent. Written patient education information was given to the patient.   The patient will call if they have further questions before procedure.

## 2023-03-03 PROBLEM — Z86.0100 PERSONAL HISTORY OF COLONIC POLYPS: Status: ACTIVE | Noted: 2023-03-03

## 2023-03-03 PROBLEM — Z86.010 PERSONAL HISTORY OF COLONIC POLYPS: Status: ACTIVE | Noted: 2023-03-03

## 2023-03-06 RX ORDER — LOSARTAN POTASSIUM 25 MG/1
TABLET ORAL DAILY
Qty: 30 TABLET | Refills: 0 | Status: SHIPPED | OUTPATIENT
Start: 2023-03-06 | End: 2023-04-04

## 2023-03-07 RX ORDER — ERGOCALCIFEROL 1.25 MG/1
50000 CAPSULE ORAL WEEKLY
Qty: 5 CAPSULE | Refills: 0 | Status: SHIPPED | OUTPATIENT
Start: 2023-03-07

## 2023-03-28 ENCOUNTER — PRIOR AUTHORIZATION (OUTPATIENT)
Dept: FAMILY MEDICINE CLINIC | Facility: CLINIC | Age: 63
End: 2023-03-28
Payer: COMMERCIAL

## 2023-03-28 ENCOUNTER — TELEPHONE (OUTPATIENT)
Dept: FAMILY MEDICINE CLINIC | Facility: CLINIC | Age: 63
End: 2023-03-28
Payer: COMMERCIAL

## 2023-03-28 DIAGNOSIS — E11.65 TYPE 2 DIABETES MELLITUS WITH HYPERGLYCEMIA, WITHOUT LONG-TERM CURRENT USE OF INSULIN: ICD-10-CM

## 2023-03-28 RX ORDER — SEMAGLUTIDE 1.34 MG/ML
0.5 INJECTION, SOLUTION SUBCUTANEOUS WEEKLY
Qty: 3 ML | Refills: 1 | Status: SHIPPED | OUTPATIENT
Start: 2023-03-28

## 2023-03-28 NOTE — TELEPHONE ENCOUNTER
NEED FOR CLARIFICATION FROM [facility] REGARDING OZEMPIC.    PER THE PHARMACY: THE MANUFACTURE HAS SWITCHED TO A 3 ML PACKAGE. CANT SUBSTITUTE WITHOUT A NEW RX. PLEASE SEND NEW RX TO REPLACE CURRENT SCRIPT.

## 2023-03-28 NOTE — TELEPHONE ENCOUNTER
A PRIOR AUTH HAS BEEN STARTED THROUGH [facility] FOR OZEMPIC.    WAITING ON A RESPONSE FROM THE INSURANCE.    Key: [id number]

## 2023-04-04 RX ORDER — LOSARTAN POTASSIUM 25 MG/1
TABLET ORAL DAILY
Qty: 30 TABLET | Refills: 0 | Status: SHIPPED | OUTPATIENT
Start: 2023-04-04

## 2023-04-12 DIAGNOSIS — E55.9 VITAMIN D DEFICIENCY: ICD-10-CM

## 2023-04-12 RX ORDER — ERGOCALCIFEROL 1.25 MG/1
CAPSULE ORAL
Qty: 5 CAPSULE | Refills: 0 | Status: SHIPPED | OUTPATIENT
Start: 2023-04-12

## 2023-05-03 NOTE — PRE-PROCEDURE INSTRUCTIONS
PAT phone history completed with pt for upcoming procedure on  5/4/23 with Dr. Smith.    PAT PASS GIVEN/REVIEWED WITH PT.  VERBALIZED UNDERSTANDING OF THE FOLLOWING:  DO NOT EAT, DRINK, SMOKE, USE SMOKELESS TOBACCO OR CHEW GUM AFTER MIDNIGHT THE NIGHT BEFORE SURGERY.  THIS ALSO INCLUDES HARD CANDIES AND MINTS.    DO NOT SHAVE THE AREA TO BE OPERATED ON AT LEAST 48 HOURS PRIOR TO THE PROCEDURE.  DO NOT WEAR MAKE UP OR NAIL POLISH.  DO NOT LEAVE IN ANY PIERCING OR WEAR JEWELRY THE DAY OF SURGERY.      DO NOT USE ADHESIVES IF YOU WEAR DENTURES.    DO NOT WEAR EYE CONTACTS; BRING IN YOUR GLASSES.    ONLY TAKE MEDICATION THE MORNING OF YOUR PROCEDURE IF INSTRUCTED BY YOUR SURGEON WITH ENOUGH WATER TO SWALLOW THE MEDICATION.  IF YOUR SURGEON DID NOT SPECIFY WHICH MEDICATIONS TO TAKE, YOU WILL NEED TO CALL THEIR OFFICE FOR FURTHER INSTRUCTIONS AND DO AS THEY INSTRUCT.    LEAVE ANYTHING YOU CONSIDER VALUABLE AT HOME.    YOU WILL NEED TO ARRANGE FOR SOMEONE TO DRIVE YOU HOME AFTER SURGERY.  IT IS RECOMMENDED THAT YOU DO NOT DRIVE, WORK, DRINK ALCOHOL OR MAKE MAJOR DECISIONS FOR AT LEAST 24 HOURS AFTER YOUR PROCEDURE IS COMPLETE.      THE DAY OF YOUR PROCEDURE, BRING IN THE FOLLOWING IF APPLICABLE:   PICTURE ID AND INSURANCE/MEDICARE OR MEDICAID CARDS/ANY CO-PAY THAT MAY BE DUE   COPY OF ADVANCED DIRECTIVE/LIVING WILL/POWER OR    CPAP/BIPAP/INHALERS   SKIN PREP SHEET   YOUR PREADMISSION TESTING PASS (IF NOT A PHONE HISTORY)

## 2023-05-04 ENCOUNTER — ANESTHESIA (OUTPATIENT)
Dept: GASTROENTEROLOGY | Facility: HOSPITAL | Age: 63
End: 2023-05-04
Payer: COMMERCIAL

## 2023-05-04 ENCOUNTER — ANESTHESIA EVENT (OUTPATIENT)
Dept: GASTROENTEROLOGY | Facility: HOSPITAL | Age: 63
End: 2023-05-04
Payer: COMMERCIAL

## 2023-05-04 ENCOUNTER — HOSPITAL ENCOUNTER (OUTPATIENT)
Facility: HOSPITAL | Age: 63
Setting detail: HOSPITAL OUTPATIENT SURGERY
Discharge: HOME | End: 2023-05-04
Attending: INTERNAL MEDICINE | Admitting: INTERNAL MEDICINE
Payer: COMMERCIAL

## 2023-05-04 VITALS
HEART RATE: 90 BPM | WEIGHT: 235 LBS | BODY MASS INDEX: 44.37 KG/M2 | OXYGEN SATURATION: 99 % | HEIGHT: 61 IN | DIASTOLIC BLOOD PRESSURE: 99 MMHG | SYSTOLIC BLOOD PRESSURE: 138 MMHG | RESPIRATION RATE: 18 BRPM | TEMPERATURE: 97.6 F

## 2023-05-04 DIAGNOSIS — Z86.010 PERSONAL HISTORY OF COLONIC POLYPS: ICD-10-CM

## 2023-05-04 PROCEDURE — 45385 COLONOSCOPY W/LESION REMOVAL: CPT | Performed by: INTERNAL MEDICINE

## 2023-05-04 PROCEDURE — 25010000002 PROPOFOL 10 MG/ML EMULSION: Performed by: NURSE ANESTHETIST, CERTIFIED REGISTERED

## 2023-05-04 RX ORDER — LIDOCAINE HYDROCHLORIDE 20 MG/ML
INJECTION, SOLUTION INTRAVENOUS AS NEEDED
Status: DISCONTINUED | OUTPATIENT
Start: 2023-05-04 | End: 2023-05-04 | Stop reason: SURG

## 2023-05-04 RX ORDER — SODIUM CHLORIDE 9 MG/ML
70 INJECTION, SOLUTION INTRAVENOUS CONTINUOUS PRN
Status: DISCONTINUED | OUTPATIENT
Start: 2023-05-04 | End: 2023-05-04 | Stop reason: HOSPADM

## 2023-05-04 RX ORDER — SIMETHICONE 20 MG/.3ML
EMULSION ORAL AS NEEDED
Status: DISCONTINUED | OUTPATIENT
Start: 2023-05-04 | End: 2023-05-04 | Stop reason: HOSPADM

## 2023-05-04 RX ORDER — PROPOFOL 10 MG/ML
VIAL (ML) INTRAVENOUS AS NEEDED
Status: DISCONTINUED | OUTPATIENT
Start: 2023-05-04 | End: 2023-05-04 | Stop reason: SURG

## 2023-05-04 RX ORDER — ONDANSETRON 2 MG/ML
4 INJECTION INTRAMUSCULAR; INTRAVENOUS ONCE AS NEEDED
Status: DISCONTINUED | OUTPATIENT
Start: 2023-05-04 | End: 2023-05-04 | Stop reason: HOSPADM

## 2023-05-04 RX ADMIN — PROPOFOL 300 MG: 10 INJECTION, EMULSION INTRAVENOUS at 08:35

## 2023-05-04 RX ADMIN — SODIUM CHLORIDE 70 ML/HR: 9 INJECTION, SOLUTION INTRAVENOUS at 07:49

## 2023-05-04 RX ADMIN — LIDOCAINE HYDROCHLORIDE 100 MG: 20 INJECTION, SOLUTION INTRAVENOUS at 08:35

## 2023-05-04 NOTE — H&P
[facility]  HISTORY AND PHYSICAL    Patient Name: Silva Maldonado  : 1960  MRN: [id number]    Chief Complaint:   For surveillance colonoscopy    History Of Presenting Illness:    H/o colon polyps     Past Medical History:   Diagnosis Date   • Cholelithiasis     Removed in    • Colon polyp 2019    Recheck i 3 years   • Diabetes mellitus 2017    type II   • Diverticulosis 2007   • Elevated cholesterol    • Hypertension    • Obesity        Past Surgical History:   Procedure Laterality Date   • CHOLECYSTECTOMY     • COLONOSCOPY  2019   • KNEE ARTHROSCOPY Left    • TONSILLECTOMY         Social History     Socioeconomic History   • Marital status:    Tobacco Use   • Smoking status: Former     Packs/day: 1.50     Years: 15.00     Pack years: 22.50     Types: Cigarettes     Start date: 1986     Quit date: 2012     Years since quittin.3     Passive exposure: Never   • Smokeless tobacco: Never   Substance and Sexual Activity   • Alcohol use: Never   • Drug use: Never   • Sexual activity: Yes     Partners: Male     Birth control/protection: Post-menopausal, Tubal ligation       Family History   Problem Relation Age of Onset   • Cancer Mother    • Hyperlipidemia Mother    • Cancer Father    • Hyperlipidemia Father    • Arthritis Brother    • Cancer Brother    • Heart disease Brother    • Hyperlipidemia Brother    • Diabetes Brother    • Hyperlipidemia Brother    • Kidney disease Brother    • Heart disease Brother    • Hyperlipidemia Brother    • Hyperlipidemia Brother    • Other Daughter         Celiac, fibromialgia   • Other Daughter         Sjorgen disease, mixed connective disorder, hyper mobility   • Colon cancer Neg Hx        Prior to Admission Medications:  Medications Prior to Admission   Medication Sig Dispense Refill Last Dose   • losartan (COZAAR) 25 MG tablet TAKE 1 TABLET BY MOUTH DAILY 30 tablet 0 2023 at 2100   • Semaglutide,0.25 or  0.5MG/DOS, (Ozempic, 0.25 or 0.5 MG/DOSE,) 2 MG/1.5ML solution pen-injector Inject 0.5 mg under the skin into the appropriate area as directed 1 (One) Time Per Week. (Patient taking differently: Inject 0.5 mg under the skin into the appropriate area as directed 1 (One) Time Per Week. Takes sundays) 3 mL 1 4/30/2023   • simvastatin (ZOCOR) 20 MG tablet Take 1 tablet by mouth Every Evening.   5/2/2023 at 2100   • sodium-potassium-magnesium sulfates (Suprep Bowel Prep Kit) 17.5-3.13-1.6 GM/177ML solution oral solution Use as directed for colonoscopy prep. Patient has instructions. 177 mL 0 5/4/2023 at 0400   • vitamin D (ERGOCALCIFEROL) 1.25 MG (37932 UT) capsule capsule TAKE 1 CAPSULE BY MOUTH 1 TIME EVERY WEEK (Patient taking differently: Take 1 capsule by mouth Every 7 (Seven) Days. saturdays) 5 capsule 0 Past Week   • Blood Glucose Monitoring Suppl kit 1 each 4 (Four) Times a Day. 1 each 0 Unknown   • glucose blood (Glucose Meter Test) test strip Use as instructed 200 each 12 Unknown       Allergies:  No Known Allergies     Vitals: Temp:  [97.1 °F (36.2 °C)] 97.1 °F (36.2 °C)  Heart Rate:  [101] 101  Resp:  [18] 18  BP: (118)/(92) 118/92    Review Of Systems:  Constitutional:  Negative for chills, fever, and unexpected weight change.  Respiratory:  Negative for cough, chest tightness, shortness of breath, and wheezing.  Cardiovascular:  Negative for chest pain, palpitations, and leg swelling.  Gastrointestinal:  Negative for abdominal distention, abdominal pain, nausea, vomiting.  Neurological:  Negative for weakness, numbness, and headaches.     Physical Exam:    General Appearance:  Alert, cooperative, in no acute distress.   Lungs:   Clear to auscultation, respirations regular, even and                 unlabored.   Heart:  Regular rhythm and normal rate.   Abdomen:   Normal bowel sounds, no masses, no organomegaly. Soft, nontender, nondistended   Neurologic: Alert and oriented x 3. Moves all four limbs equally        Assessment & Plan     Assessment:  Principal Problem:    Personal history of colonic polyps      Plan: COLONOSCOPY (N/A)     Breanne Smith MD  5/4/2023

## 2023-05-04 NOTE — DISCHARGE INSTRUCTIONS
- Discharge patient to home (ambulatory).   - High fiber diet.   - Continue present medications.   - Await pathology results.   - Repeat colonoscopy in 3 - 5 years for surveillance pending path.   - Return to GI office in 8 weeks.    No pushing, pulling, tugging,  heavy lifting, or strenuous activity.  No major decision making, driving, or drinking alcoholic beverages for 24 hours. ( due to the medications you have  received)  Always use good hand hygiene/washing techniques.  NO driving while taking pain medications.    To assist you in voiding:  Drink plenty of fluids  Listen to running water while attempting to void.    If you are unable to urinate and you have an uncomfortable urge to void or it has been   6 hours since you were discharged, return to the Emergency Room

## 2023-05-04 NOTE — ANESTHESIA PREPROCEDURE EVALUATION
Anesthesia Evaluation     Patient summary reviewed and Nursing notes reviewed   NPO Solid Status: > 8 hours  NPO Liquid Status: > 8 hours           Airway   Mallampati: II  TM distance: >3 FB  Neck ROM: full  Possible difficult intubation  Dental - normal exam     Pulmonary - normal exam   (+) a smoker Former,   Cardiovascular - normal exam  Exercise tolerance: good (4-7 METS)    (+) hypertension, hyperlipidemia,       Neuro/Psych- negative ROS  GI/Hepatic/Renal/Endo    (+) obesity, morbid obesity,  renal disease, diabetes mellitus type 2,     Musculoskeletal (-) negative ROS    Abdominal    Substance History - negative use     OB/GYN negative ob/gyn ROS         Other                      Anesthesia Plan    ASA 3     MAC     (Risks and benefits discussed including risk of aspiration, recall and dental damage. All patient questions answered.    Will continue with plan of care.)  intravenous induction     Anesthetic plan, risks, benefits, and alternatives have been provided, discussed and informed consent has been obtained with: patient.  Pre-procedure education provided  Plan discussed with CRNA.        CODE STATUS:

## 2023-05-04 NOTE — ANESTHESIA POSTPROCEDURE EVALUATION
Patient: Silva Maldonado    Procedure Summary     Date: 05/04/23 Room / Location: [facility] ENDOSCOPY 2 / [facility] ENDOSCOPY    Anesthesia Start: 0829 Anesthesia Stop: 0858    Procedure: COLONOSCOPY with polypectomy x 5 (Anus) Diagnosis:       Personal history of colonic polyps      (Personal history of colonic polyps [Z86.010])    Surgeons: Breanne Smith MD Provider: Farshad Vizcarra CRNA    Anesthesia Type: MAC ASA Status: 3          Anesthesia Type: MAC    Vitals  Vitals Value Taken Time   /84 05/04/23 0900   Temp 97.6 °F (36.4 °C) 05/04/23 0900   Pulse 79 05/04/23 0900   Resp 17 05/04/23 0900   SpO2 94 % 05/04/23 0900           Post Anesthesia Care and Evaluation    Patient location during evaluation: PHASE II  Patient participation: complete - patient participated  Level of consciousness: awake  Pain score: 1  Pain management: adequate    Airway patency: patent  Anesthetic complications: No anesthetic complications  PONV Status: controlled  Cardiovascular status: acceptable and stable  Respiratory status: acceptable  Hydration status: acceptable    Comments: See Nursing record for post procedural Vital Signs as per protocol.

## 2023-05-05 LAB — REF LAB TEST METHOD: NORMAL

## 2023-05-08 RX ORDER — LOSARTAN POTASSIUM 25 MG/1
TABLET ORAL DAILY
Qty: 30 TABLET | Refills: 2 | Status: SHIPPED | OUTPATIENT
Start: 2023-05-08

## 2023-05-23 ENCOUNTER — OFFICE VISIT (OUTPATIENT)
Dept: FAMILY MEDICINE CLINIC | Facility: CLINIC | Age: 63
End: 2023-05-23
Payer: COMMERCIAL

## 2023-05-23 VITALS
RESPIRATION RATE: 16 BRPM | HEIGHT: 61 IN | SYSTOLIC BLOOD PRESSURE: 132 MMHG | DIASTOLIC BLOOD PRESSURE: 94 MMHG | TEMPERATURE: 97 F | WEIGHT: 235.6 LBS | BODY MASS INDEX: 44.48 KG/M2 | OXYGEN SATURATION: 99 % | HEART RATE: 94 BPM

## 2023-05-23 DIAGNOSIS — E11.65 TYPE 2 DIABETES MELLITUS WITH HYPERGLYCEMIA, WITHOUT LONG-TERM CURRENT USE OF INSULIN: ICD-10-CM

## 2023-05-23 DIAGNOSIS — E55.9 VITAMIN D DEFICIENCY: ICD-10-CM

## 2023-05-23 DIAGNOSIS — I10 PRIMARY HYPERTENSION: ICD-10-CM

## 2023-05-23 DIAGNOSIS — Z00.00 ROUTINE GENERAL MEDICAL EXAMINATION AT A HEALTH CARE FACILITY: Primary | ICD-10-CM

## 2023-05-23 DIAGNOSIS — E78.2 MIXED HYPERLIPIDEMIA: ICD-10-CM

## 2023-05-23 RX ORDER — SEMAGLUTIDE 1.34 MG/ML
1 INJECTION, SOLUTION SUBCUTANEOUS WEEKLY
Qty: 3 ML | Refills: 3 | Status: SHIPPED | OUTPATIENT
Start: 2023-05-23

## 2023-05-23 RX ORDER — SIMVASTATIN 20 MG
20 TABLET ORAL EVERY EVENING
Qty: 30 TABLET | Refills: 3 | Status: SHIPPED | OUTPATIENT
Start: 2023-05-23

## 2023-05-23 RX ORDER — SEMAGLUTIDE 0.68 MG/ML
INJECTION, SOLUTION SUBCUTANEOUS
COMMUNITY
Start: 2023-04-26 | End: 2023-05-23

## 2023-05-23 NOTE — PROGRESS NOTES
"                      Established Patient        Chief Complaint:   Chief Complaint   Patient presents with   • Annual Exam         History of Present Illness:    Silva Maldonado is a 62 y.o. female who presents today for annual physical exam. No acute concerns today.     HTN--Losartan 25mg--tolerating well    T2DM--Ozempic 0.5mg--patient checking glucose 1-2 times per day, tolerating well but still feels like her glucose is still higher than it should be. Patient states that a few days she has run above 250 but also knows that she probably did not eat as well as she should those days, most days running around 200 or less.--patient is due for diabetic eye exam--denies numbness or tingling in feet. Last A1c 10.9    HLD--Simvastatin 20mg nightly, tolerating well    Last pap smear over 10 years ago, just moved to area, would like referral to GYN    Subjective     The following portions of the patient's history were reviewed and updated as appropriate: allergies, current medications, past family history, past medical history, past social history, past surgical history and problem list.    ALLERGIES  No Known Allergies    ROS  Review of Systems   Constitutional: Negative for fatigue and unexpected weight change.   Respiratory: Negative for cough and shortness of breath.    Cardiovascular: Negative for chest pain and palpitations.   Gastrointestinal: Negative for abdominal pain, constipation, diarrhea and nausea.   Neurological: Negative for dizziness and weakness.   Psychiatric/Behavioral: Negative for confusion and sleep disturbance.       Objective     Vital Signs:   /94   Pulse 94   Temp 97 °F (36.1 °C)   Resp 16   Ht 154.9 cm (61\")   Wt 107 kg (235 lb 9.6 oz)   SpO2 99%   BMI 44.52 kg/m²     Class 3 Severe Obesity (BMI >=40). Obesity-related health conditions include the following: hypertension, diabetes mellitus and dyslipidemias. Obesity is unchanged. BMI is is above average; BMI management plan is " completed. We discussed portion control and increasing exercise.       Physical Exam   Physical Exam  Vitals and nursing note reviewed.   HENT:      Nose: Nose normal.      Mouth/Throat:      Lips: Pink.   Eyes:      General: Lids are normal.      Conjunctiva/sclera: Conjunctivae normal.      Pupils: Pupils are equal, round, and reactive to light.   Cardiovascular:      Rate and Rhythm: Normal rate and regular rhythm.      Heart sounds: Normal heart sounds.   Pulmonary:      Effort: Pulmonary effort is normal.      Breath sounds: Normal breath sounds.   Abdominal:      General: Bowel sounds are normal.      Palpations: Abdomen is soft.   Musculoskeletal:         General: Normal range of motion.   Neurological:      Mental Status: She is alert and oriented to person, place, and time.      Gait: Gait is intact.   Psychiatric:         Attention and Perception: Attention normal.         Mood and Affect: Mood and affect normal.         Speech: Speech normal.         Behavior: Behavior normal. Behavior is cooperative.       Assessment and Plan      Assessment/Plan:   Diagnoses and all orders for this visit:    1. Routine general medical examination at a health care facility (Primary)  -     Ambulatory Referral to Ophthalmology  -     Ambulatory Referral to Obstetrics / Gynecology  -     CBC w AUTO Differential  -     Comprehensive metabolic panel    2. Type 2 diabetes mellitus with hyperglycemia, without long-term current use of insulin  -     Semaglutide, 1 MG/DOSE, (Ozempic, 1 MG/DOSE,) 4 MG/3ML solution pen-injector; Inject 1 mg under the skin into the appropriate area as directed 1 (One) Time Per Week.  Dispense: 3 mL; Refill: 3  -     MicroAlbumin, Urine, Random - Urine, Clean Catch; Future  -     Ambulatory Referral to Ophthalmology    3. Mixed hyperlipidemia  -     simvastatin (ZOCOR) 20 MG tablet; Take 1 tablet by mouth Every Evening.  Dispense: 30 tablet; Refill: 3  -     Lipid Panel    4. Vitamin D deficiency  -      Vitamin D 25 hydroxy    5. Primary hypertension        Discussion Summary:  Discussed plan of care in detail with pt today; pt verb understanding and agrees.      I spent 35 minutes caring for Silva on this date of service. This time includes time spent by me in the following activities:preparing for the visit, reviewing tests, obtaining and/or reviewing a separately obtained history, performing a medically appropriate examination and/or evaluation , counseling and educating the patient/family/caregiver, ordering medications, tests, or procedures and documenting information in the medical record      I have reviewed and updated all copied forward information, as appropriate.  I attest to the accuracy and relevance of any unchanged information.      Follow up:  Return in about 3 months (around 8/23/2023) for Recheck.     Patient Education:  There are no Patient Instructions on file for this visit.    ROSY Cruz  05/23/23  09:04 EDT          Please note that portions of this note may have been completed with a voice recognition program.

## 2023-05-24 LAB
25(OH)D3+25(OH)D2 SERPL-MCNC: 27.7 NG/ML (ref 30–100)
ALBUMIN SERPL-MCNC: 4.4 G/DL (ref 3.5–5.2)
ALBUMIN/GLOB SERPL: 1.8 G/DL
ALP SERPL-CCNC: 122 U/L (ref 39–117)
ALT SERPL-CCNC: 30 U/L (ref 1–33)
AST SERPL-CCNC: 17 U/L (ref 1–32)
BASOPHILS # BLD AUTO: 0.13 10*3/MM3 (ref 0–0.2)
BASOPHILS NFR BLD AUTO: 1.2 % (ref 0–1.5)
BILIRUB SERPL-MCNC: 0.6 MG/DL (ref 0–1.2)
BUN SERPL-MCNC: 13 MG/DL (ref 8–23)
BUN/CREAT SERPL: 14.9 (ref 7–25)
CALCIUM SERPL-MCNC: 9.8 MG/DL (ref 8.6–10.5)
CHLORIDE SERPL-SCNC: 98 MMOL/L (ref 98–107)
CHOLEST SERPL-MCNC: 187 MG/DL (ref 0–200)
CO2 SERPL-SCNC: 23.4 MMOL/L (ref 22–29)
CREAT SERPL-MCNC: 0.87 MG/DL (ref 0.57–1)
EGFRCR SERPLBLD CKD-EPI 2021: 76.4 ML/MIN/1.73
EOSINOPHIL # BLD AUTO: 0.25 10*3/MM3 (ref 0–0.4)
EOSINOPHIL NFR BLD AUTO: 2.2 % (ref 0.3–6.2)
ERYTHROCYTE [DISTWIDTH] IN BLOOD BY AUTOMATED COUNT: 12.7 % (ref 12.3–15.4)
GLOBULIN SER CALC-MCNC: 2.4 GM/DL
GLUCOSE SERPL-MCNC: 290 MG/DL (ref 65–99)
HCT VFR BLD AUTO: 46.5 % (ref 34–46.6)
HDLC SERPL-MCNC: 44 MG/DL (ref 40–60)
HGB BLD-MCNC: 15.4 G/DL (ref 12–15.9)
IMM GRANULOCYTES # BLD AUTO: 0.1 10*3/MM3 (ref 0–0.05)
IMM GRANULOCYTES NFR BLD AUTO: 0.9 % (ref 0–0.5)
LDLC SERPL CALC-MCNC: 97 MG/DL (ref 0–100)
LYMPHOCYTES # BLD AUTO: 2.14 10*3/MM3 (ref 0.7–3.1)
LYMPHOCYTES NFR BLD AUTO: 19.1 % (ref 19.6–45.3)
MCH RBC QN AUTO: 27.3 PG (ref 26.6–33)
MCHC RBC AUTO-ENTMCNC: 33.1 G/DL (ref 31.5–35.7)
MCV RBC AUTO: 82.3 FL (ref 79–97)
MONOCYTES # BLD AUTO: 0.68 10*3/MM3 (ref 0.1–0.9)
MONOCYTES NFR BLD AUTO: 6.1 % (ref 5–12)
NEUTROPHILS # BLD AUTO: 7.92 10*3/MM3 (ref 1.7–7)
NEUTROPHILS NFR BLD AUTO: 70.5 % (ref 42.7–76)
NRBC BLD AUTO-RTO: 0 /100 WBC (ref 0–0.2)
PLATELET # BLD AUTO: 287 10*3/MM3 (ref 140–450)
POTASSIUM SERPL-SCNC: 4.6 MMOL/L (ref 3.5–5.2)
PROT SERPL-MCNC: 6.8 G/DL (ref 6–8.5)
RBC # BLD AUTO: 5.65 10*6/MM3 (ref 3.77–5.28)
SODIUM SERPL-SCNC: 137 MMOL/L (ref 136–145)
TRIGL SERPL-MCNC: 273 MG/DL (ref 0–150)
VLDLC SERPL CALC-MCNC: 46 MG/DL (ref 5–40)
WBC # BLD AUTO: 11.22 10*3/MM3 (ref 3.4–10.8)

## 2023-07-11 PROBLEM — R74.8 ELEVATED ALKALINE PHOSPHATASE LEVEL: Status: ACTIVE | Noted: 2023-07-11

## 2023-08-03 RX ORDER — LOSARTAN POTASSIUM 25 MG/1
TABLET ORAL DAILY
Qty: 30 TABLET | Refills: 2 | Status: SHIPPED | OUTPATIENT
Start: 2023-08-03

## 2023-08-08 ENCOUNTER — LAB (OUTPATIENT)
Dept: LAB | Facility: HOSPITAL | Age: 63
End: 2023-08-08
Payer: COMMERCIAL

## 2023-08-08 ENCOUNTER — HOSPITAL ENCOUNTER (OUTPATIENT)
Dept: ULTRASOUND IMAGING | Facility: HOSPITAL | Age: 63
Discharge: HOME | End: 2023-08-08
Payer: COMMERCIAL

## 2023-08-08 DIAGNOSIS — R74.8 ELEVATED LIVER ENZYMES: ICD-10-CM

## 2023-08-08 LAB
ALBUMIN SERPL-MCNC: 4 G/DL (ref 3.5–5.2)
ALBUMIN/GLOB SERPL: 1.7 G/DL
ALP SERPL-CCNC: 107 U/L (ref 39–117)
ALPHA1 GLOB MFR UR ELPH: 133 MG/DL (ref 90–200)
ALT SERPL W P-5'-P-CCNC: 25 U/L (ref 1–33)
ANION GAP SERPL CALCULATED.3IONS-SCNC: 14 MMOL/L (ref 5–15)
AST SERPL-CCNC: 16 U/L (ref 1–32)
BILIRUB SERPL-MCNC: 0.7 MG/DL (ref 0–1.2)
BUN SERPL-MCNC: 14 MG/DL (ref 8–23)
BUN/CREAT SERPL: 15.9 (ref 7–25)
CALCIUM SPEC-SCNC: 9.5 MG/DL (ref 8.6–10.5)
CERULOPLASMIN SERPL-MCNC: 24 MG/DL (ref 19–39)
CHLORIDE SERPL-SCNC: 106 MMOL/L (ref 98–107)
CO2 SERPL-SCNC: 21 MMOL/L (ref 22–29)
CREAT SERPL-MCNC: 0.88 MG/DL (ref 0.57–1)
DEPRECATED RDW RBC AUTO: 35.3 FL (ref 37–54)
EGFRCR SERPLBLD CKD-EPI 2021: 75.3 ML/MIN/1.73
ERYTHROCYTE [DISTWIDTH] IN BLOOD BY AUTOMATED COUNT: 12.5 % (ref 12.3–15.4)
FERRITIN SERPL-MCNC: 323 NG/ML (ref 13–150)
GLOBULIN UR ELPH-MCNC: 2.4 GM/DL
GLUCOSE SERPL-MCNC: 218 MG/DL (ref 65–99)
HBV SURFACE AG SERPL QL IA: NORMAL
HCT VFR BLD AUTO: 40.9 % (ref 34–46.6)
HCV AB SER DONR QL: NORMAL
HGB BLD-MCNC: 13.7 G/DL (ref 12–15.9)
INR PPP: 0.94 (ref 0.9–1.1)
IRON 24H UR-MRATE: 77 MCG/DL (ref 37–145)
IRON SATN MFR SERPL: 23 % (ref 20–50)
MCH RBC QN AUTO: 26.7 PG (ref 26.6–33)
MCHC RBC AUTO-ENTMCNC: 33.5 G/DL (ref 31.5–35.7)
MCV RBC AUTO: 79.6 FL (ref 79–97)
PLATELET # BLD AUTO: 281 10*3/MM3 (ref 140–450)
PMV BLD AUTO: 10.5 FL (ref 6–12)
POTASSIUM SERPL-SCNC: 4.1 MMOL/L (ref 3.5–5.2)
PROT SERPL-MCNC: 6.4 G/DL (ref 6–8.5)
PROTHROMBIN TIME: 13.1 SECONDS (ref 12.3–15.1)
RBC # BLD AUTO: 5.14 10*6/MM3 (ref 3.77–5.28)
SODIUM SERPL-SCNC: 141 MMOL/L (ref 136–145)
TIBC SERPL-MCNC: 340 MCG/DL (ref 298–536)
TRANSFERRIN SERPL-MCNC: 228 MG/DL (ref 200–360)
WBC NRBC COR # BLD: 9.3 10*3/MM3 (ref 3.4–10.8)

## 2023-08-08 PROCEDURE — 82172 ASSAY OF APOLIPOPROTEIN: CPT

## 2023-08-08 PROCEDURE — 83540 ASSAY OF IRON: CPT

## 2023-08-08 PROCEDURE — 84466 ASSAY OF TRANSFERRIN: CPT

## 2023-08-08 PROCEDURE — 86708 HEPATITIS A ANTIBODY: CPT

## 2023-08-08 PROCEDURE — 86015 ACTIN ANTIBODY EACH: CPT

## 2023-08-08 PROCEDURE — 82947 ASSAY GLUCOSE BLOOD QUANT: CPT

## 2023-08-08 PROCEDURE — 85610 PROTHROMBIN TIME: CPT

## 2023-08-08 PROCEDURE — 82728 ASSAY OF FERRITIN: CPT

## 2023-08-08 PROCEDURE — 83883 ASSAY NEPHELOMETRY NOT SPEC: CPT

## 2023-08-08 PROCEDURE — 85027 COMPLETE CBC AUTOMATED: CPT

## 2023-08-08 PROCEDURE — 86803 HEPATITIS C AB TEST: CPT

## 2023-08-08 PROCEDURE — 87340 HEPATITIS B SURFACE AG IA: CPT

## 2023-08-08 PROCEDURE — 84478 ASSAY OF TRIGLYCERIDES: CPT

## 2023-08-08 PROCEDURE — 82103 ALPHA-1-ANTITRYPSIN TOTAL: CPT

## 2023-08-08 PROCEDURE — 76705 ECHO EXAM OF ABDOMEN: CPT

## 2023-08-08 PROCEDURE — 82247 BILIRUBIN TOTAL: CPT

## 2023-08-08 PROCEDURE — 82390 ASSAY OF CERULOPLASMIN: CPT

## 2023-08-08 PROCEDURE — 83010 ASSAY OF HAPTOGLOBIN QUANT: CPT

## 2023-08-08 PROCEDURE — 36415 COLL VENOUS BLD VENIPUNCTURE: CPT

## 2023-08-08 PROCEDURE — 86381 MITOCHONDRIAL ANTIBODY EACH: CPT

## 2023-08-08 PROCEDURE — 86317 IMMUNOASSAY INFECTIOUS AGENT: CPT

## 2023-08-08 PROCEDURE — 80053 COMPREHEN METABOLIC PANEL: CPT

## 2023-08-08 PROCEDURE — 86704 HEP B CORE ANTIBODY TOTAL: CPT

## 2023-08-08 PROCEDURE — 82977 ASSAY OF GGT: CPT

## 2023-08-08 PROCEDURE — 82465 ASSAY BLD/SERUM CHOLESTEROL: CPT

## 2023-08-09 LAB
A2 MACROGLOB SERPL-MCNC: 222 MG/DL (ref 110–276)
ALT SERPL W P-5'-P-CCNC: 28 IU/L (ref 0–40)
APO A-I SERPL-MCNC: 126 MG/DL (ref 116–209)
AST SERPL W P-5'-P-CCNC: 17 IU/L (ref 0–40)
BILIRUB SERPL-MCNC: 0.6 MG/DL (ref 0–1.2)
CHOLEST SERPL-MCNC: 177 MG/DL (ref 100–199)
FIBROSIS SCORING:: ABNORMAL
FIBROSIS STAGE SERPL QL: ABNORMAL
GGT SERPL-CCNC: 36 IU/L (ref 0–60)
GLUCOSE SERPL-MCNC: 216 MG/DL (ref 70–99)
HAPTOGLOB SERPL-MCNC: 222 MG/DL (ref 33–346)
HAV AB SER QL IA: NEGATIVE
HBV CORE AB SERPL QL IA: NEGATIVE
HBV SURFACE AB SER-ACNC: 4.4 MIU/ML
LABORATORY COMMENT REPORT: ABNORMAL
LIVER FIBR SCORE SERPL CALC.FIBROSURE: 0.29 (ref 0–0.21)
LIVER STEATOSIS GRADE SERPL QL: ABNORMAL
LIVER STEATOSIS SCORE SERPL: 0.9 (ref 0–0.4)
MITOCHONDRIA M2 IGG SER-ACNC: <20 UNITS (ref 0–20)
NASH GRADE SERPL QL: ABNORMAL
NASH INTERPRETATION SERPL-IMP: ABNORMAL
NASH SCORE SERPL: 0.53 (ref 0–0.25)
NASH SCORING: ABNORMAL
SMA IGG SER-ACNC: 4 UNITS (ref 0–19)
STEATOSIS SCORING: ABNORMAL
TEST PERFORMANCE INFO SPEC: ABNORMAL
TEST PERFORMANCE INFO SPEC: ABNORMAL
TRIGL SERPL-MCNC: 303 MG/DL (ref 0–149)

## 2023-08-14 DIAGNOSIS — E78.1 HYPERTRIGLYCERIDEMIA: Primary | ICD-10-CM

## 2023-08-14 RX ORDER — FENOFIBRATE 145 MG/1
145 TABLET, COATED ORAL DAILY
Qty: 30 TABLET | Refills: 5 | Status: SHIPPED | OUTPATIENT
Start: 2023-08-14

## 2023-09-07 ENCOUNTER — OFFICE VISIT (OUTPATIENT)
Dept: OBSTETRICS AND GYNECOLOGY | Facility: CLINIC | Age: 63
End: 2023-09-07
Payer: COMMERCIAL

## 2023-09-07 VITALS
SYSTOLIC BLOOD PRESSURE: 128 MMHG | HEIGHT: 62 IN | BODY MASS INDEX: 42.88 KG/M2 | WEIGHT: 233 LBS | DIASTOLIC BLOOD PRESSURE: 88 MMHG

## 2023-09-07 DIAGNOSIS — Z01.419 WELL WOMAN EXAM: Primary | ICD-10-CM

## 2023-09-07 DIAGNOSIS — Z12.31 SCREENING MAMMOGRAM FOR BREAST CANCER: ICD-10-CM

## 2023-09-07 DIAGNOSIS — Z87.448 HISTORY OF PROLAPSE OF BLADDER: ICD-10-CM

## 2023-09-07 PROCEDURE — 99396 PREV VISIT EST AGE 40-64: CPT | Performed by: STUDENT IN AN ORGANIZED HEALTH CARE EDUCATION/TRAINING PROGRAM

## 2023-09-07 NOTE — PROGRESS NOTES
Annual Well Woman Visit    Subjective   Chief Complaint   Patient presents with    Annual Exam     Last PAP 10 +/- years ago.   Last Colonoscopy -Polyps found  No previous DEXA  No history of abnormal PAP in the past  Incomplete bladder emptying.      Silva Maldonado is a 62 y.o.  presenting to be seen for an annual well woman visit. She is due for a Pap smear. She also reports concerns today for incomplete bladder emptying. She has a history of prior prolapse surgery and her symptoms currently feel similar to prior to her procedure. Her surgery was in  and she believes it fixed both bladder and rectal prolapse. She does recall that mesh was used. Her procedure was performed in [place], [place]. She feels the need to urinate frequently with occasional stress urinary incontinence. She does sometimes empty her bladder and on standing up, has some dribbling or has to sit back down to finish emptying. Reports some pelvic pain with prolonged sitting. Denies dyspareunia.    LMP: approximately 15 years ago with no VB since that time.    OB Hx:   OB History    Para Term  AB Living   3 3 2 1   2   SAB IAB Ectopic Molar Multiple Live Births             2      # Outcome Date GA Lbr John/2nd Weight Sex Delivery Anes PTL Lv   3 Term 2003 39w0d  3629 g (8 lb) F Vag-Spont   STONE   2  10/2000 34w0d   F Vag-Spont   STONE   1 Term  37w0d    Vag-Spont   FD      Contraception: s/p BTL, postmenopausal  Pap smear: ~10 years ago, denies h/o abnormal  Mammogram: 24, BI-RADS 2  Colonoscopy: 23, benign polyps removed  DEXA Scan: N/A    Past Medical History:   Diagnosis Date    Cholelithiasis     Removed in     Colon polyp 2019    Recheck i 3 years    Diabetes mellitus 2017    type II    Diverticulosis 2007    Elevated cholesterol     Gestational hypertension 1999    Hypertension     Multiple gestation     1 still born, 2 live burths    Obesity     Ovarian cyst     Pelvic  prolapse 2001    Preeclampsia     Varicella 1968     Past Surgical History:   Procedure Laterality Date    CHOLECYSTECTOMY  1998    COLONOSCOPY  2019    COLONOSCOPY N/A 2023    Procedure: COLONOSCOPY with polypectomy x 5;  Surgeon: Breanne Smith MD;  Location: [facility] ENDOSCOPY;  Service: Gastroenterology;  Laterality: N/A;    KNEE ARTHROSCOPY Left     LAPAROSCOPIC CHOLECYSTECTOMY      TONSILLECTOMY      TUBAL ABDOMINAL LIGATION      VAGINAL PROLAPSE REPAIR  2006     Family History   Problem Relation Age of Onset    Cancer Father         Brain tumor - unsure if primary or not    Hyperlipidemia Father     Cervical cancer Mother     Hyperlipidemia Mother     Stroke Mother     Hypertension Mother     Arthritis Brother     Lung cancer Brother     Heart disease Brother     Hyperlipidemia Brother     Hypertension Brother     Diabetes Brother     Hyperlipidemia Brother     Kidney disease Brother     Hypertension Brother     Heart disease Brother     Hyperlipidemia Brother     Hypertension Brother     Hyperlipidemia Brother     Celiac disease Daughter     Fibromyalgia Daughter     Other Daughter         Possible POTS    Sjogren's syndrome Daughter     Other Daughter         Mixed connective tissue disorder, EDS    Colon cancer Neg Hx      Social History     Tobacco Use    Smoking status: Former     Packs/day: 1.50     Years: 15.00     Pack years: 22.50     Types: Cigarettes     Start date: 1986     Quit date: 2012     Years since quittin.6     Passive exposure: Never    Smokeless tobacco: Never   Vaping Use    Vaping Use: Never used   Substance Use Topics    Alcohol use: Never    Drug use: Never     (Not in a hospital admission)    Patient has no known allergies.  Current Outpatient Medications on File Prior to Visit   Medication Sig Dispense Refill    Blood Glucose Monitoring Suppl kit 1 each 4 (Four) Times a Day. 1 each 0    fenofibrate (Tricor) 145 MG tablet Take 1 tablet  "by mouth Daily. 30 tablet 5    glucose blood (Glucose Meter Test) test strip Use as instructed 200 each 12    losartan (COZAAR) 25 MG tablet TAKE 1 TABLET BY MOUTH DAILY 30 tablet 2    Semaglutide, 1 MG/DOSE, (Ozempic, 1 MG/DOSE,) 4 MG/3ML solution pen-injector Inject 1 mg under the skin into the appropriate area as directed 1 (One) Time Per Week. (Patient taking differently: Inject 1 mg under the skin into the appropriate area as directed 1 (One) Time Per Week. Takes on Sundays) 3 mL 3    simvastatin (ZOCOR) 20 MG tablet Take 1 tablet by mouth Every Evening. 30 tablet 3    vitamin D (ERGOCALCIFEROL) 1.25 MG (54897 UT) capsule capsule TAKE 1 CAPSULE BY MOUTH 1 TIME EVERY WEEK (Patient taking differently: Take 1 capsule by mouth Every 7 (Seven) Days. ) 5 capsule 0     No current facility-administered medications on file prior to visit.     Social History    Tobacco Use      Smoking status: Former        Packs/day: 1.50        Years: 15.00        Pack years: 22.5        Types: Cigarettes        Start date: 1986        Quit date: 2012        Years since quittin.6        Passive exposure: Never      Smokeless tobacco: Never    Review of Systems  Pertinent items are noted in HPI, all other systems were reviewed and negative     Objective   /88   Ht 157.5 cm (62\")   Wt 106 kg (233 lb)   BMI 42.62 kg/m²     Physical Exam:  General Appearance: alert, interactive, and cooperative  Breasts:  Examined in supine position  Symmetric without masses or skin dimpling  Nipples normal without inversion, lesions or discharge  There are no palpable axillary nodes  Abdomen: no masses, soft, non-tender, no guarding and no rebound tenderness  Pelvis:  Pelvic: Clinical staff was present for exam  External genitalia:  normal appearance of the external genitalia including Bartholin's and [place]'s glands  :  urethral meatus normal  Vagina:  normal pink mucosa without lesions. With valsalva, no significant " descent of anterior or apical compartments. Minimal posterior compartment descent. No mesh visualized, however this could be in part due to any redundant vaginal tissue. On bimanual exam, mesh feels very superficial.  Cervix:  normal appearance  Uterus:  limited bimanual exam, no masses or tenderness  Adnexa:  no masses or tenderness noted       Assessment & Plan    Annual well woman exam with age appropriate screening      Diagnosis Plan   1. Well woman exam  LIQUID-BASED PAP SMEAR WITH HPV GENOTYPING REGARDLESS OF INTERPRETATION (MARTHA,COR,MAD)      2. Screening mammogram for breast cancer  Mammo Screening Digital Tomosynthesis Bilateral With CAD      3. History of prolapse of bladder           Medications ordered: None    Procedures performed: Pap    - Mammogram: Ordered  - Pap screening guidelines reviewed; pap smear completed today  - Yearly clinical breast and pelvic exams recommended regardless of pap recommendations  - Dexa scan: not indicated    - Colonoscopy: up to date  - Healthy diet and exercise encouraged  - Contraception: s/p BTL/ postmenopausal  - Incomplete bladder emptying: Exam consistent with mild rectocele, no significant anterior prolapse. Mesh is palpated on exam but not visualized - suspect thinning of overlying mucosa vs small asymptomatic mesh exposure. Denies any vaginal bleeding/ spotting or pain - encouraged to follow up if she does experience these symptoms. We discussed prolapse/ incontinence management options, including observation, PFPT, pessary and surgery. With prior mesh surgery, a repeat procedure would be more complex and thus I would recommend referral to either Urology or Urogynecology. Silva reports she is not overly bothered by her symptoms and would like to observe for now.    Follow up for annual visit or sooner with any concerns.    Jeaneth Turner MD  Obstetrics and Gynecology  [facility]

## 2023-09-12 LAB — REF LAB TEST METHOD: NORMAL

## 2023-09-15 ENCOUNTER — OFFICE VISIT (OUTPATIENT)
Dept: FAMILY MEDICINE CLINIC | Facility: CLINIC | Age: 63
End: 2023-09-15
Payer: COMMERCIAL

## 2023-09-15 VITALS
RESPIRATION RATE: 16 BRPM | OXYGEN SATURATION: 98 % | BODY MASS INDEX: 42.88 KG/M2 | HEIGHT: 62 IN | HEART RATE: 88 BPM | DIASTOLIC BLOOD PRESSURE: 84 MMHG | SYSTOLIC BLOOD PRESSURE: 136 MMHG | TEMPERATURE: 97.2 F | WEIGHT: 233 LBS

## 2023-09-15 DIAGNOSIS — I10 PRIMARY HYPERTENSION: ICD-10-CM

## 2023-09-15 DIAGNOSIS — E11.43 TYPE 2 DIABETES MELLITUS WITH DIABETIC AUTONOMIC NEUROPATHY, WITHOUT LONG-TERM CURRENT USE OF INSULIN: Primary | ICD-10-CM

## 2023-09-15 DIAGNOSIS — E78.2 MIXED HYPERLIPIDEMIA: ICD-10-CM

## 2023-09-15 DIAGNOSIS — G62.9 POLYNEUROPATHY: ICD-10-CM

## 2023-09-15 LAB
EXPIRATION DATE: NORMAL
HBA1C MFR BLD: 9 %
Lab: NORMAL

## 2023-09-15 RX ORDER — MULTIVITAMIN WITH IRON
1 TABLET ORAL NIGHTLY PRN
COMMUNITY

## 2023-09-15 RX ORDER — SIMVASTATIN 20 MG
20 TABLET ORAL EVERY EVENING
Qty: 30 TABLET | Refills: 3 | Status: SHIPPED | OUTPATIENT
Start: 2023-09-15

## 2023-09-19 DIAGNOSIS — E11.65 TYPE 2 DIABETES MELLITUS WITH HYPERGLYCEMIA, WITHOUT LONG-TERM CURRENT USE OF INSULIN: ICD-10-CM

## 2023-09-19 RX ORDER — SEMAGLUTIDE 1.34 MG/ML
1 INJECTION, SOLUTION SUBCUTANEOUS WEEKLY
Qty: 3 ML | Refills: 3 | Status: SHIPPED | OUTPATIENT
Start: 2023-09-19

## 2023-09-27 PROBLEM — E11.65 TYPE 2 DIABETES MELLITUS WITH HYPERGLYCEMIA, WITHOUT LONG-TERM CURRENT USE OF INSULIN: Status: RESOLVED | Noted: 2022-11-29 | Resolved: 2023-09-27

## 2023-09-27 PROBLEM — E66.813 CLASS 3 SEVERE OBESITY DUE TO EXCESS CALORIES WITH SERIOUS COMORBIDITY AND BODY MASS INDEX (BMI) OF 45.0 TO 49.9 IN ADULT: Chronic | Status: RESOLVED | Noted: 2023-03-02 | Resolved: 2023-09-27

## 2023-09-27 PROBLEM — E66.01 CLASS 3 SEVERE OBESITY DUE TO EXCESS CALORIES WITH SERIOUS COMORBIDITY AND BODY MASS INDEX (BMI) OF 45.0 TO 49.9 IN ADULT: Chronic | Status: RESOLVED | Noted: 2023-03-02 | Resolved: 2023-09-27

## 2023-09-27 PROBLEM — G62.9 POLYNEUROPATHY: Status: ACTIVE | Noted: 2023-09-27

## 2023-09-27 PROBLEM — E11.43 TYPE 2 DIABETES MELLITUS WITH DIABETIC AUTONOMIC NEUROPATHY, WITHOUT LONG-TERM CURRENT USE OF INSULIN: Status: ACTIVE | Noted: 2022-11-29

## 2023-11-01 RX ORDER — LOSARTAN POTASSIUM 25 MG/1
TABLET ORAL DAILY
Qty: 30 TABLET | Refills: 2 | Status: SHIPPED | OUTPATIENT
Start: 2023-11-01

## 2023-11-20 ENCOUNTER — PRIOR AUTHORIZATION (OUTPATIENT)
Dept: FAMILY MEDICINE CLINIC | Facility: CLINIC | Age: 63
End: 2023-11-20
Payer: COMMERCIAL

## 2023-12-15 ENCOUNTER — OFFICE VISIT (OUTPATIENT)
Dept: FAMILY MEDICINE CLINIC | Facility: CLINIC | Age: 63
End: 2023-12-15
Payer: COMMERCIAL

## 2023-12-15 VITALS
SYSTOLIC BLOOD PRESSURE: 132 MMHG | TEMPERATURE: 97.6 F | BODY MASS INDEX: 43.98 KG/M2 | OXYGEN SATURATION: 98 % | DIASTOLIC BLOOD PRESSURE: 82 MMHG | HEIGHT: 62 IN | WEIGHT: 239 LBS | RESPIRATION RATE: 16 BRPM | HEART RATE: 82 BPM

## 2023-12-15 DIAGNOSIS — E11.42 TYPE 2 DIABETES MELLITUS WITH DIABETIC POLYNEUROPATHY, WITHOUT LONG-TERM CURRENT USE OF INSULIN: Primary | ICD-10-CM

## 2023-12-15 DIAGNOSIS — E78.2 MIXED HYPERLIPIDEMIA: ICD-10-CM

## 2023-12-15 LAB
EXPIRATION DATE: ABNORMAL
HBA1C MFR BLD: 9.2 % (ref 4.5–5.7)
Lab: ABNORMAL

## 2023-12-15 RX ORDER — SEMAGLUTIDE 1.34 MG/ML
1 INJECTION, SOLUTION SUBCUTANEOUS WEEKLY
Qty: 3 ML | Refills: 3 | Status: SHIPPED | OUTPATIENT
Start: 2023-12-15

## 2023-12-15 NOTE — PROGRESS NOTES
"                      Established Patient        Chief Complaint:   Chief Complaint   Patient presents with    Diabetes     3 month follow up          History of Present Illness:    Silva Maldonado is a 63 y.o. female who presents today for 3 month follow up of HTN, T2DM, HLD.     HTN--Losartan 25mg--tolerating well    [place], obesity--not exercising as much as she was due to change in weather--wants to join a gym    T2DM--Ozempic 1 mg--states that she has had some difficulty getting Ozempic so she has missed some doses--has not been as diligent about diet with holiday foods    Neuropathy--patient still taking magnesium but has noticed decrease in efficacy--increased hours at work, worse the longer she is on her feet--describes as cramping, aching--reports that it does not bother her during the day, notices it at night when she lays down to go to bed    HLD--Fenofibrate ordered to take in addition to Simvastatin 20mg nightly--tolerating well, denies adverse effects of medication    Subjective     The following portions of the patient's history were reviewed and updated as appropriate: allergies, current medications, past family history, past medical history, past social history, past surgical history and problem list.    ALLERGIES  No Known Allergies    ROS  Review of Systems   Constitutional:  Negative for fatigue and unexpected weight change.   Respiratory:  Negative for cough and shortness of breath.    Cardiovascular:  Negative for chest pain and palpitations.   Gastrointestinal:  Negative for abdominal pain, constipation, diarrhea and nausea.   Neurological:  Negative for dizziness, weakness and numbness.   Psychiatric/Behavioral:  Negative for confusion and sleep disturbance.        Lab Results   Component Value Date    HGBA1C 9.2 (A) 12/15/2023       Objective     Vital Signs:   /82   Pulse 82   Temp 97.6 °F (36.4 °C)   Resp 16   Ht 157.5 cm (62\")   Wt 108 kg (239 lb)   SpO2 98%   BMI 43.71 kg/m² "     Class 3 Severe Obesity (BMI >=40). Obesity-related health conditions include the following: hypertension, diabetes mellitus and dyslipidemias. Obesity is unchanged. BMI is is above average; BMI management plan is completed. We discussed portion control and increasing exercise.       Physical Exam   Physical Exam  Vitals and nursing note reviewed.   HENT:      Nose: Nose normal.      Mouth/Throat:      Lips: Pink.   Eyes:      General: Lids are normal.      Conjunctiva/sclera: Conjunctivae normal.      Pupils: Pupils are equal, round, and reactive to light.   Cardiovascular:      Rate and Rhythm: Normal rate and regular rhythm.      Heart sounds: Normal heart sounds.   Pulmonary:      Effort: Pulmonary effort is normal.      Breath sounds: Normal breath sounds.   Abdominal:      General: Bowel sounds are normal.      Palpations: Abdomen is soft.   Musculoskeletal:         General: Normal range of motion.   Feet:      Right foot:      Protective Sensation: 10 sites tested.  10 sites sensed.      Left foot:      Protective Sensation: 10 sites tested.  10 sites sensed.   Neurological:      Mental Status: She is alert and oriented to person, place, and time.      Gait: Gait is intact.   Psychiatric:         Attention and Perception: Attention normal.         Mood and Affect: Mood and affect normal.         Speech: Speech normal.         Behavior: Behavior normal. Behavior is cooperative.       Assessment and Plan      Assessment/Plan:   Diagnoses and all orders for this visit:    1. Type 2 diabetes mellitus with diabetic polyneuropathy, without long-term current use of insulin (Primary)  Assessment & Plan:  Diabetes is unchanged.   Continue current treatment regimen.  Reminded to bring in blood sugar diary at next visit.  Regular aerobic exercise.  Discussed ways to avoid symptomatic hypoglycemia.  Discussed sick day management.  Patient to increase dietary intake of vegetables, fruits, nuts, whole grains and fish.  Decrease dietary intake of carbs, processed foods such as lunch meats, saturated fats, sugary beverages.     Increase exercise regimen as tolerated toward a goal of 120-150 minutes/week.     Diabetes will be reassessed in 3 months.    Orders:  -     POC Glycosylated Hemoglobin (Hb A1C)  -     Semaglutide, 1 MG/DOSE, (Ozempic, 1 MG/DOSE,) 4 MG/3ML solution pen-injector; Inject 1 mg under the skin into the appropriate area as directed 1 (One) Time Per Week.  Dispense: 3 mL; Refill: 3    2. Mixed hyperlipidemia  -     Lipid Panel    Patient was encouraged to keep me informed of any acute changes, lack of improvement, or any new concerning symptoms.    If neuropathy continues, will refer to vascular surgery for further evaluation of possible venous insufficiency.     Patient to continue magnesium for treatment of neuropathy.    Patient voiced understanding of all instructions and denied further questions.    Agreeable to plan of care.         I have reviewed and updated all copied forward information, as appropriate.  I attest to the accuracy and relevance of any unchanged information.      Follow up:  Return in about 3 months (around 3/15/2024) for Next scheduled follow up.     Patient Education:  There are no Patient Instructions on file for this visit.    ROSY Cruz  12/31/23  20:07 EST          Please note that portions of this note may have been completed with a voice recognition program. Answers submitted by the patient for this visit:  Other (Submitted on 9/15/2023)  Please describe your symptoms.: 3 month follow up  Have you had these symptoms before?: No  How long have you been having these symptoms?: Greater than 2 weeks  Primary Reason for Visit (Submitted on 9/15/2023)  What is the primary reason for your visit?: Other    Answers submitted by the patient for this visit:  Other (Submitted on 12/14/2023)  Please describe your symptoms.: 3 month follow up, Pain in feet and legs  Have you had these  symptoms before?: Yes  How long have you been having these symptoms?: Greater than 2 weeks  Please list any medications you are currently taking for this condition.: Tylenol, 500 mg magnesium  Please describe any probable cause for these symptoms. : None  Primary Reason for Visit (Submitted on 12/14/2023)  What is the primary reason for your visit?: Other

## 2023-12-16 LAB
CHOLEST SERPL-MCNC: 168 MG/DL (ref 0–200)
HDLC SERPL-MCNC: 46 MG/DL (ref 40–60)
LDLC SERPL CALC-MCNC: 97 MG/DL (ref 0–100)
TRIGL SERPL-MCNC: 144 MG/DL (ref 0–150)
VLDLC SERPL CALC-MCNC: 25 MG/DL (ref 5–40)

## 2023-12-26 ENCOUNTER — TELEMEDICINE (OUTPATIENT)
Dept: FAMILY MEDICINE CLINIC | Facility: CLINIC | Age: 63
End: 2023-12-26
Payer: COMMERCIAL

## 2023-12-26 DIAGNOSIS — R09.81 NASAL CONGESTION: ICD-10-CM

## 2023-12-26 DIAGNOSIS — Z87.891 PERSONAL HISTORY OF TOBACCO USE, PRESENTING HAZARDS TO HEALTH: ICD-10-CM

## 2023-12-26 DIAGNOSIS — R52 BODY ACHES: ICD-10-CM

## 2023-12-26 DIAGNOSIS — R05.1 ACUTE COUGH: ICD-10-CM

## 2023-12-26 DIAGNOSIS — I10 PRIMARY HYPERTENSION: ICD-10-CM

## 2023-12-26 DIAGNOSIS — Z20.822 CLOSE EXPOSURE TO COVID-19 VIRUS: ICD-10-CM

## 2023-12-26 DIAGNOSIS — U07.1 COVID-19: Primary | ICD-10-CM

## 2023-12-26 DIAGNOSIS — J02.9 SORE THROAT: ICD-10-CM

## 2023-12-26 DIAGNOSIS — E11.65 TYPE 2 DIABETES MELLITUS WITH HYPERGLYCEMIA, WITHOUT LONG-TERM CURRENT USE OF INSULIN: ICD-10-CM

## 2023-12-26 DIAGNOSIS — J34.89 SINUS PRESSURE: ICD-10-CM

## 2023-12-26 RX ORDER — BROMPHENIRAMINE MALEATE, PSEUDOEPHEDRINE HYDROCHLORIDE, AND DEXTROMETHORPHAN HYDROBROMIDE 2; 30; 10 MG/5ML; MG/5ML; MG/5ML
5 SYRUP ORAL 4 TIMES DAILY PRN
Qty: 118 ML | Refills: 0 | Status: SHIPPED | OUTPATIENT
Start: 2023-12-26

## 2023-12-26 RX ORDER — PREDNISONE 10 MG/1
10 TABLET ORAL DAILY
Qty: 5 TABLET | Refills: 0 | Status: SHIPPED | OUTPATIENT
Start: 2023-12-26 | End: 2023-12-31

## 2023-12-26 NOTE — ASSESSMENT & PLAN NOTE
Diabetes is unchanged.   Discussed sick day management.  Diabetes will be reassessed  at the next regular appointment .

## 2023-12-26 NOTE — ASSESSMENT & PLAN NOTE
Hypertension is unchanged.  Continue current treatment regimen.  Ambulatory blood pressure monitoring.  Blood pressure will be reassessed at the next regular appointment.

## 2023-12-26 NOTE — PROGRESS NOTES
Established Patient        Chief Complaint:   Chief Complaint   Patient presents with    Cough     Pts daughter tested positive for covid Thursday.     Sore Throat    Headache       You have chosen to receive care through a telephone visit. Do you consent to use a telephone visit for your medical care today? Yes    History of Present Illness:    Silva Maldonado is a 63 y.o. female who presents today via [employer] [facility] encounter. Patient is being seen today for cough, congestion, severe headache, sinus pressure, mild body aches. Denies fever, N/V/D. Reports onset of mild sore throat this morning.     Onset Saturday evening. Daughter positive for COVID Thursday. Patient positive this morning on at-home test.    Took Benadryl last night to attempt to sleep due to severe headache. Has taken tylenol, IBU, nasal saline for symptom management.     At the time of encounter today, patient is located in her home and provider is located in [facility] office in [place], [place].   Subjective     The following portions of the patient's history were reviewed and updated as appropriate: allergies, current medications, past family history, past medical history, past social history, past surgical history and problem list.    ALLERGIES  No Known Allergies    ROS  Review of Systems   Constitutional:  Positive for fatigue. Negative for chills and fever.   HENT:  Positive for congestion, sinus pressure, sinus pain and sore throat.    Respiratory:  Positive for cough. Negative for chest tightness and shortness of breath.    Cardiovascular:  Negative for chest pain and palpitations.   Gastrointestinal:  Negative for diarrhea, nausea and vomiting.   Musculoskeletal:  Positive for myalgias.   Neurological:  Positive for headaches. Negative for dizziness.   Psychiatric/Behavioral:  Negative for sleep disturbance.        Objective     Vital Signs:   There were no vitals taken for this visit.            Physical Exam   Physical  Exam  Vitals and nursing note reviewed.   Constitutional:       Appearance: Normal appearance.   Pulmonary:      Effort: Pulmonary effort is normal.   Neurological:      Mental Status: She is alert and oriented to person, place, and time.   Psychiatric:         Mood and Affect: Mood normal.         Behavior: Behavior normal.         Assessment and Plan      Assessment/Plan:   Diagnoses and all orders for this visit:    1. COVID-19 (Primary)  -     brompheniramine-pseudoephedrine-DM 30-2-10 MG/5ML syrup; Take 5 mL by mouth 4 (Four) Times a Day As Needed for Cough or Congestion.  Dispense: 118 mL; Refill: 0  -     predniSONE (DELTASONE) 10 MG tablet; Take 1 tablet by mouth Daily for 5 days.  Dispense: 5 tablet; Refill: 0    2. Sinus pressure  -     brompheniramine-pseudoephedrine-DM 30-2-10 MG/5ML syrup; Take 5 mL by mouth 4 (Four) Times a Day As Needed for Cough or Congestion.  Dispense: 118 mL; Refill: 0    3. Nasal congestion  -     brompheniramine-pseudoephedrine-DM 30-2-10 MG/5ML syrup; Take 5 mL by mouth 4 (Four) Times a Day As Needed for Cough or Congestion.  Dispense: 118 mL; Refill: 0    4. Acute cough  -     brompheniramine-pseudoephedrine-DM 30-2-10 MG/5ML syrup; Take 5 mL by mouth 4 (Four) Times a Day As Needed for Cough or Congestion.  Dispense: 118 mL; Refill: 0  -     predniSONE (DELTASONE) 10 MG tablet; Take 1 tablet by mouth Daily for 5 days.  Dispense: 5 tablet; Refill: 0    5. Body aches  -     predniSONE (DELTASONE) 10 MG tablet; Take 1 tablet by mouth Daily for 5 days.  Dispense: 5 tablet; Refill: 0    6. Sore throat  -     brompheniramine-pseudoephedrine-DM 30-2-10 MG/5ML syrup; Take 5 mL by mouth 4 (Four) Times a Day As Needed for Cough or Congestion.  Dispense: 118 mL; Refill: 0  -     predniSONE (DELTASONE) 10 MG tablet; Take 1 tablet by mouth Daily for 5 days.  Dispense: 5 tablet; Refill: 0    7. Close exposure to COVID-19 virus    8. Type 2 diabetes mellitus with hyperglycemia, without  long-term current use of insulin  Assessment & Plan:  Diabetes is unchanged.   Discussed sick day management.  Diabetes will be reassessed  at the next regular appointment .      9. Primary hypertension  Assessment & Plan:  Hypertension is unchanged.  Continue current treatment regimen.  Ambulatory blood pressure monitoring.  Blood pressure will be reassessed at the next regular appointment.      10. Personal history of tobacco use, presenting hazards to health    Risks, benefits, and potential side effects of current/new medications reviewed with patient.  Patient voiced understanding and wished to proceed with treatment.    May alternate tylenol and IBU every 4-6 hours PRN pain, fever > 100.4.    Patient to closely monitor BP and glucose while on medications. Follow sick-day precautions for glucose.    Patient was encouraged to keep me informed of any acute changes, lack of improvement, or any new concerning symptoms.    Patient voiced understanding of all instructions and denied further questions.      I have reviewed and updated all copied forward information, as appropriate.  I attest to the accuracy and relevance of any unchanged information.    This was an audio and video enabled telemedicine encounter.      Follow up:  No follow-ups on file.     Patient Education:  Patient Instructions   Please review the decision aid used during our discussion regarding the Low dose lung cancer screening visit today.                            Sharmila Oconnell, ROSY  12/26/23  09:12 EST          Please note that portions of this note may have been completed with a voice recognition program.

## 2024-01-01 NOTE — ASSESSMENT & PLAN NOTE
Diabetes is unchanged.   Continue current treatment regimen.  Reminded to bring in blood sugar diary at next visit.  Regular aerobic exercise.  Discussed ways to avoid symptomatic hypoglycemia.  Discussed sick day management.  Patient to increase dietary intake of vegetables, fruits, nuts, whole grains and fish. Decrease dietary intake of carbs, processed foods such as lunch meats, saturated fats, sugary beverages.     Increase exercise regimen as tolerated toward a goal of 120-150 minutes/week.     Diabetes will be reassessed in 3 months.

## 2024-02-28 ENCOUNTER — PRIOR AUTHORIZATION (OUTPATIENT)
Dept: FAMILY MEDICINE CLINIC | Facility: CLINIC | Age: 64
End: 2024-02-28
Payer: COMMERCIAL

## 2024-02-28 DIAGNOSIS — E11.42 TYPE 2 DIABETES MELLITUS WITH DIABETIC POLYNEUROPATHY, WITHOUT LONG-TERM CURRENT USE OF INSULIN: ICD-10-CM

## 2024-03-07 RX ORDER — SEMAGLUTIDE 1.34 MG/ML
1 INJECTION, SOLUTION SUBCUTANEOUS WEEKLY
Qty: 3 ML | Refills: 3 | Status: SHIPPED | OUTPATIENT
Start: 2024-03-07

## 2024-03-29 ENCOUNTER — HOSPITAL ENCOUNTER (OUTPATIENT)
Dept: MAMMOGRAPHY | Facility: HOSPITAL | Age: 64
Discharge: HOME | End: 2024-03-29
Admitting: STUDENT IN AN ORGANIZED HEALTH CARE EDUCATION/TRAINING PROGRAM
Payer: COMMERCIAL

## 2024-03-29 PROCEDURE — 77063 BREAST TOMOSYNTHESIS BI: CPT

## 2024-03-29 PROCEDURE — 77067 SCR MAMMO BI INCL CAD: CPT

## 2024-04-12 ENCOUNTER — OFFICE VISIT (OUTPATIENT)
Dept: FAMILY MEDICINE CLINIC | Facility: CLINIC | Age: 64
End: 2024-04-12
Payer: COMMERCIAL

## 2024-04-12 VITALS
RESPIRATION RATE: 16 BRPM | DIASTOLIC BLOOD PRESSURE: 82 MMHG | OXYGEN SATURATION: 98 % | WEIGHT: 235 LBS | TEMPERATURE: 98 F | HEIGHT: 62 IN | SYSTOLIC BLOOD PRESSURE: 128 MMHG | BODY MASS INDEX: 43.24 KG/M2 | HEART RATE: 84 BPM

## 2024-04-12 DIAGNOSIS — E78.2 MIXED HYPERLIPIDEMIA: ICD-10-CM

## 2024-04-12 DIAGNOSIS — I10 PRIMARY HYPERTENSION: ICD-10-CM

## 2024-04-12 DIAGNOSIS — E11.42 TYPE 2 DIABETES MELLITUS WITH DIABETIC POLYNEUROPATHY, WITHOUT LONG-TERM CURRENT USE OF INSULIN: Primary | ICD-10-CM

## 2024-04-12 DIAGNOSIS — K59.00 CONSTIPATION, UNSPECIFIED CONSTIPATION TYPE: ICD-10-CM

## 2024-04-12 DIAGNOSIS — R19.7 DIARRHEA, UNSPECIFIED TYPE: ICD-10-CM

## 2024-04-12 LAB
EXPIRATION DATE: ABNORMAL
HGB BLDA-MCNC: 10.3 G/DL (ref 12–17)
Lab: ABNORMAL

## 2024-04-12 PROCEDURE — 99214 OFFICE O/P EST MOD 30 MIN: CPT | Performed by: NURSE PRACTITIONER

## 2024-04-12 PROCEDURE — 85018 HEMOGLOBIN: CPT | Performed by: NURSE PRACTITIONER

## 2024-04-12 RX ORDER — LOSARTAN POTASSIUM 25 MG/1
25 TABLET ORAL DAILY
Qty: 30 TABLET | Refills: 2 | Status: SHIPPED | OUTPATIENT
Start: 2024-04-12

## 2024-04-12 RX ORDER — SEMAGLUTIDE 2.68 MG/ML
2 INJECTION, SOLUTION SUBCUTANEOUS WEEKLY
Qty: 3 ML | Refills: 2 | Status: SHIPPED | OUTPATIENT
Start: 2024-04-12 | End: 2024-04-18 | Stop reason: DRUGHIGH

## 2024-04-12 RX ORDER — SIMVASTATIN 20 MG
20 TABLET ORAL EVERY EVENING
Qty: 30 TABLET | Refills: 3 | Status: SHIPPED | OUTPATIENT
Start: 2024-04-12

## 2024-04-12 NOTE — PROGRESS NOTES
Established Patient        Chief Complaint:   Chief Complaint   Patient presents with    Hypertension     Pt is here for a 3 month follow up          History of Present Illness:    Silva Maldonado is a 63 y.o. female who presents today for 3 month follow up of HTN, T2DM, HLD.     HTN--Losartan 25mg--tolerating well, Denies chest pain, palpitations, SOA. Wears compression socks which has improved peripheral edema.     T2DM--Ozempic 1 mg--Reports that her blood sugar has been ore elevated. Blood sugar has been between 180-230. Last A1C in December- 9.2%. Has lost 4 pounds since December visit. Reports trying to avoid sugars and carbs. Reports that she is not exercising as much as she should but plans on walking more as weather warms up. A1C today 10.3%    Neuropathy--patient still taking magnesium and is wearing compression stockings--reports that she does not has as much pain at night now.     HLD--Fenofibrate 145 mg daily. Simvastatin 20mg nightly--tolerating well, denies adverse effects of medication    Reports that she is having constipation and diarrhea. Having more diarrhea than constipation. States that after she eats has urgent diarrhea. Reports that there is no specific trigger. Then she will have constipation for 1--2 days. Reports that symptoms are more prominent after ozempic injection.    Subjective     The following portions of the patient's history were reviewed and updated as appropriate: allergies, current medications, past family history, past medical history, past social history, past surgical history and problem list.    ALLERGIES  No Known Allergies    ROS  Review of Systems   Constitutional:  Negative for fatigue and unexpected weight change.   Respiratory:  Negative for cough, shortness of breath and wheezing.    Cardiovascular:  Negative for chest pain, palpitations and leg swelling.   Gastrointestinal:  Positive for abdominal pain, diarrhea and vomiting. Negative for  "blood in stool, constipation and nausea.   Neurological:  Negative for dizziness, weakness and numbness.   Psychiatric/Behavioral:  Negative for confusion and sleep disturbance.        Lab Results   Component Value Date    HGBA1C 9.2 (A) 12/15/2023       Objective     Vital Signs:   /82   Pulse 84   Temp 98 °F (36.7 °C)   Resp 16   Ht 157.5 cm (62\")   Wt 107 kg (235 lb)   SpO2 98%   BMI 42.98 kg/m²     Class 3 Severe Obesity (BMI >=40). Obesity-related health conditions include the following: hypertension, diabetes mellitus and dyslipidemias. Obesity is unchanged. BMI is is above average; BMI management plan is completed. We discussed portion control and increasing exercise.       Physical Exam   Physical Exam  Vitals and nursing note reviewed.   Constitutional:       Appearance: Normal appearance.   HENT:      Nose: Nose normal.      Mouth/Throat:      Lips: Pink.   Eyes:      General: Lids are normal.      Conjunctiva/sclera: Conjunctivae normal.      Pupils: Pupils are equal, round, and reactive to light.   Cardiovascular:      Rate and Rhythm: Normal rate and regular rhythm.      Heart sounds: Normal heart sounds.   Pulmonary:      Effort: Pulmonary effort is normal.      Breath sounds: Normal breath sounds.   Abdominal:      General: Bowel sounds are normal.      Palpations: Abdomen is soft.   Musculoskeletal:         General: Normal range of motion.      Cervical back: Normal range of motion and neck supple.   Feet:      Right foot:      Protective Sensation: 10 sites tested.  10 sites sensed.      Left foot:      Protective Sensation: 10 sites tested.  10 sites sensed.   Skin:     General: Skin is warm and dry.   Neurological:      Mental Status: She is alert and oriented to person, place, and time. Mental status is at baseline.      Gait: Gait is intact.   Psychiatric:         Attention and Perception: Attention normal.         Mood and Affect: Mood and affect normal.         Speech: Speech " normal.         Behavior: Behavior normal. Behavior is cooperative.       Assessment and Plan      Assessment/Plan:   Diagnoses and all orders for this visit:    1. Type 2 diabetes mellitus with diabetic polyneuropathy, without long-term current use of insulin (Primary)  -     Cancel: Hemoglobin A1c  -     Microalbumin / Creatinine Urine Ratio - Urine, Clean Catch  -     POCT hemoglobin  -     empagliflozin (Jardiance) 10 MG tablet tablet; Take 1 tablet by mouth Daily.  Dispense: 30 tablet; Refill: 2  -     Discontinue: Semaglutide, 2 MG/DOSE, (Ozempic, 2 MG/DOSE,) 8 MG/3ML solution pen-injector; Inject 2 mg under the skin into the appropriate area as directed 1 (One) Time Per Week.  Dispense: 3 mL; Refill: 2  -     Basic metabolic panel; Future    2. Primary hypertension  Assessment & Plan:  Hypertension is stable and controlled  Continue current treatment regimen.  Dietary sodium restriction.  Weight loss.  Regular aerobic exercise.  Ambulatory blood pressure monitoring.  Blood pressure will be reassessed in 3 months.    Orders:  -     Comprehensive metabolic panel  -     CBC w AUTO Differential  -     losartan (COZAAR) 25 MG tablet; Take 1 tablet by mouth Daily.  Dispense: 30 tablet; Refill: 2    3. Mixed hyperlipidemia  -     Lipid panel  -     simvastatin (ZOCOR) 20 MG tablet; Take 1 tablet by mouth Every Evening.  Dispense: 30 tablet; Refill: 3    4. Diarrhea, unspecified type  -     Comprehensive metabolic panel  -     CBC w AUTO Differential    5. Constipation, unspecified constipation type    6. BMI 40.0-44.9, adult    Diarrhea and constipation likely caused by Ozempic. Continue to take ozempic. Discussed with patient avoiding fatty, fried, or sweet foods. Discussed avoiding OTC stool softeners and laxatives the day or 2 after taking ozempic and starting them on day 3 when constipation has been starting.     Patient was encouraged to keep me informed of any acute changes, lack of improvement, or any new  concerning symptoms.    Patient to continue magnesium for treatment of neuropathy.    Patient voiced understanding of all instructions and denied further questions.    Agreeable to plan of care.       I, ROSY Cruz, personally performed the services described in this documentation, as scribed by Nadeem GALLEGOS student in my presence, and is both accurate and complete.        I have reviewed and updated all copied forward information, as appropriate.  I attest to the accuracy and relevance of any unchanged information.      Follow up:  Return in about 3 months (around 7/12/2024).     Patient Education:  There are no Patient Instructions on file for this visit.    ROSY Cruz  04/29/24  20:15 EDT          Please note that portions of this note may have been completed with a voice recognition program. Answers submitted by the patient for this visit:  Other (Submitted on 9/15/2023)  Please describe your symptoms.: 3 month follow up  Have you had these symptoms before?: No  How long have you been having these symptoms?: Greater than 2 weeks  Primary Reason for Visit (Submitted on 9/15/2023)  What is the primary reason for your visit?: Other    Answers submitted by the patient for this visit:  Other (Submitted on 12/14/2023)  Please describe your symptoms.: 3 month follow up, Pain in feet and legs  Have you had these symptoms before?: Yes  How long have you been having these symptoms?: Greater than 2 weeks  Please list any medications you are currently taking for this condition.: Tylenol, 500 mg magnesium  Please describe any probable cause for these symptoms. : None  Primary Reason for Visit (Submitted on 12/14/2023)  What is the primary reason for your visit?: Other

## 2024-04-12 NOTE — ASSESSMENT & PLAN NOTE
Hypertension is stable and controlled  Continue current treatment regimen.  Dietary sodium restriction.  Weight loss.  Regular aerobic exercise.  Ambulatory blood pressure monitoring.  Blood pressure will be reassessed in 3 months.

## 2024-04-12 NOTE — ASSESSMENT & PLAN NOTE
Diabetes is worsening.   Medication changes per orders.  Reminded to bring in blood sugar diary at next visit.  Recommended an ADA diet.  Recommended a Mediterranean style of eating  Regular aerobic exercise.  Discussed ways to avoid symptomatic hypoglycemia.  Reminded to get yearly retinal exam.  Diabetes will be reassessed in 3 months

## 2024-04-13 LAB
ALBUMIN SERPL-MCNC: 4.5 G/DL (ref 3.5–5.2)
ALBUMIN/CREAT UR: 21 MG/G CREAT (ref 0–29)
ALBUMIN/GLOB SERPL: 2 G/DL
ALP SERPL-CCNC: 110 U/L (ref 39–117)
ALT SERPL-CCNC: 35 U/L (ref 1–33)
AST SERPL-CCNC: 24 U/L (ref 1–32)
BASOPHILS # BLD AUTO: 0.12 10*3/MM3 (ref 0–0.2)
BASOPHILS NFR BLD AUTO: 1.1 % (ref 0–1.5)
BILIRUB SERPL-MCNC: 0.6 MG/DL (ref 0–1.2)
BUN SERPL-MCNC: 14 MG/DL (ref 8–23)
BUN/CREAT SERPL: 14 (ref 7–25)
CALCIUM SERPL-MCNC: 9.6 MG/DL (ref 8.6–10.5)
CHLORIDE SERPL-SCNC: 101 MMOL/L (ref 98–107)
CHOLEST SERPL-MCNC: 200 MG/DL (ref 0–200)
CO2 SERPL-SCNC: 24.5 MMOL/L (ref 22–29)
CREAT SERPL-MCNC: 1 MG/DL (ref 0.57–1)
CREAT UR-MCNC: 136.2 MG/DL
EGFRCR SERPLBLD CKD-EPI 2021: 64.2 ML/MIN/1.73
EOSINOPHIL # BLD AUTO: 0.39 10*3/MM3 (ref 0–0.4)
EOSINOPHIL NFR BLD AUTO: 3.5 % (ref 0.3–6.2)
ERYTHROCYTE [DISTWIDTH] IN BLOOD BY AUTOMATED COUNT: 12.7 % (ref 12.3–15.4)
GLOBULIN SER CALC-MCNC: 2.2 GM/DL
GLUCOSE SERPL-MCNC: 230 MG/DL (ref 65–99)
HCT VFR BLD AUTO: 44.7 % (ref 34–46.6)
HDLC SERPL-MCNC: 47 MG/DL (ref 40–60)
HGB BLD-MCNC: 14.8 G/DL (ref 12–15.9)
IMM GRANULOCYTES # BLD AUTO: 0.08 10*3/MM3 (ref 0–0.05)
IMM GRANULOCYTES NFR BLD AUTO: 0.7 % (ref 0–0.5)
LDLC SERPL CALC-MCNC: 111 MG/DL (ref 0–100)
LYMPHOCYTES # BLD AUTO: 2.38 10*3/MM3 (ref 0.7–3.1)
LYMPHOCYTES NFR BLD AUTO: 21.7 % (ref 19.6–45.3)
MCH RBC QN AUTO: 26.9 PG (ref 26.6–33)
MCHC RBC AUTO-ENTMCNC: 33.1 G/DL (ref 31.5–35.7)
MCV RBC AUTO: 81.3 FL (ref 79–97)
MICROALBUMIN UR-MCNC: 28.4 UG/ML
MONOCYTES # BLD AUTO: 0.7 10*3/MM3 (ref 0.1–0.9)
MONOCYTES NFR BLD AUTO: 6.4 % (ref 5–12)
NEUTROPHILS # BLD AUTO: 7.32 10*3/MM3 (ref 1.7–7)
NEUTROPHILS NFR BLD AUTO: 66.6 % (ref 42.7–76)
NRBC BLD AUTO-RTO: 0 /100 WBC (ref 0–0.2)
PLATELET # BLD AUTO: 310 10*3/MM3 (ref 140–450)
POTASSIUM SERPL-SCNC: 4.7 MMOL/L (ref 3.5–5.2)
PROT SERPL-MCNC: 6.7 G/DL (ref 6–8.5)
RBC # BLD AUTO: 5.5 10*6/MM3 (ref 3.77–5.28)
SODIUM SERPL-SCNC: 137 MMOL/L (ref 136–145)
TRIGL SERPL-MCNC: 242 MG/DL (ref 0–150)
VLDLC SERPL CALC-MCNC: 42 MG/DL (ref 5–40)
WBC # BLD AUTO: 10.99 10*3/MM3 (ref 3.4–10.8)

## 2024-04-14 ENCOUNTER — HOSPITAL ENCOUNTER (EMERGENCY)
Facility: HOSPITAL | Age: 64
Discharge: HOME | End: 2024-04-14
Attending: EMERGENCY MEDICINE | Admitting: EMERGENCY MEDICINE
Payer: COMMERCIAL

## 2024-04-14 ENCOUNTER — APPOINTMENT (OUTPATIENT)
Dept: GENERAL RADIOLOGY | Facility: HOSPITAL | Age: 64
End: 2024-04-14
Payer: COMMERCIAL

## 2024-04-14 ENCOUNTER — APPOINTMENT (OUTPATIENT)
Dept: CT IMAGING | Facility: HOSPITAL | Age: 64
End: 2024-04-14
Payer: COMMERCIAL

## 2024-04-14 VITALS
BODY MASS INDEX: 43.24 KG/M2 | DIASTOLIC BLOOD PRESSURE: 97 MMHG | HEART RATE: 80 BPM | HEIGHT: 62 IN | RESPIRATION RATE: 18 BRPM | WEIGHT: 235 LBS | OXYGEN SATURATION: 97 % | TEMPERATURE: 97.9 F | SYSTOLIC BLOOD PRESSURE: 160 MMHG

## 2024-04-14 DIAGNOSIS — R10.9 ACUTE RIGHT FLANK PAIN: ICD-10-CM

## 2024-04-14 DIAGNOSIS — N10 ACUTE PYELONEPHRITIS: Primary | ICD-10-CM

## 2024-04-14 LAB
ALBUMIN SERPL-MCNC: 4 G/DL (ref 3.5–5.2)
ALBUMIN/GLOB SERPL: 1.7 G/DL
ALP SERPL-CCNC: 101 U/L (ref 39–117)
ALT SERPL W P-5'-P-CCNC: 27 U/L (ref 1–33)
ANION GAP SERPL CALCULATED.3IONS-SCNC: 11.3 MMOL/L (ref 5–15)
AST SERPL-CCNC: 18 U/L (ref 1–32)
BACTERIA UR QL AUTO: ABNORMAL /HPF
BASOPHILS # BLD AUTO: 0.09 10*3/MM3 (ref 0–0.2)
BASOPHILS NFR BLD AUTO: 1 % (ref 0–1.5)
BILIRUB SERPL-MCNC: 0.4 MG/DL (ref 0–1.2)
BILIRUB UR QL STRIP: NEGATIVE
BUN SERPL-MCNC: 14 MG/DL (ref 8–23)
BUN/CREAT SERPL: 15.7 (ref 7–25)
CALCIUM SPEC-SCNC: 9.1 MG/DL (ref 8.6–10.5)
CHLORIDE SERPL-SCNC: 102 MMOL/L (ref 98–107)
CLARITY UR: ABNORMAL
CO2 SERPL-SCNC: 21.7 MMOL/L (ref 22–29)
COLOR UR: YELLOW
CREAT SERPL-MCNC: 0.89 MG/DL (ref 0.57–1)
D-LACTATE SERPL-SCNC: 2.2 MMOL/L (ref 0.5–2)
DEPRECATED RDW RBC AUTO: 34.3 FL (ref 37–54)
EGFRCR SERPLBLD CKD-EPI 2021: 73.9 ML/MIN/1.73
EOSINOPHIL # BLD AUTO: 0.25 10*3/MM3 (ref 0–0.4)
EOSINOPHIL NFR BLD AUTO: 2.7 % (ref 0.3–6.2)
ERYTHROCYTE [DISTWIDTH] IN BLOOD BY AUTOMATED COUNT: 12 % (ref 12.3–15.4)
GLOBULIN UR ELPH-MCNC: 2.4 GM/DL
GLUCOSE SERPL-MCNC: 237 MG/DL (ref 65–99)
GLUCOSE UR STRIP-MCNC: ABNORMAL MG/DL
HCT VFR BLD AUTO: 38.5 % (ref 34–46.6)
HGB BLD-MCNC: 13.1 G/DL (ref 12–15.9)
HGB UR QL STRIP.AUTO: ABNORMAL
HOLD SPECIMEN: NORMAL
HOLD SPECIMEN: NORMAL
HYALINE CASTS UR QL AUTO: ABNORMAL /LPF
IMM GRANULOCYTES # BLD AUTO: 0.07 10*3/MM3 (ref 0–0.05)
IMM GRANULOCYTES NFR BLD AUTO: 0.8 % (ref 0–0.5)
KETONES UR QL STRIP: NEGATIVE
LEUKOCYTE ESTERASE UR QL STRIP.AUTO: ABNORMAL
LIPASE SERPL-CCNC: 31 U/L (ref 13–60)
LYMPHOCYTES # BLD AUTO: 2.23 10*3/MM3 (ref 0.7–3.1)
LYMPHOCYTES NFR BLD AUTO: 24.3 % (ref 19.6–45.3)
MCH RBC QN AUTO: 26.8 PG (ref 26.6–33)
MCHC RBC AUTO-ENTMCNC: 34 G/DL (ref 31.5–35.7)
MCV RBC AUTO: 78.7 FL (ref 79–97)
MONOCYTES # BLD AUTO: 0.62 10*3/MM3 (ref 0.1–0.9)
MONOCYTES NFR BLD AUTO: 6.8 % (ref 5–12)
NEUTROPHILS NFR BLD AUTO: 5.9 10*3/MM3 (ref 1.7–7)
NEUTROPHILS NFR BLD AUTO: 64.4 % (ref 42.7–76)
NITRITE UR QL STRIP: NEGATIVE
NRBC BLD AUTO-RTO: 0 /100 WBC (ref 0–0.2)
PH UR STRIP.AUTO: 6.5 [PH] (ref 5–8)
PLATELET # BLD AUTO: 216 10*3/MM3 (ref 140–450)
PMV BLD AUTO: 9.7 FL (ref 6–12)
POTASSIUM SERPL-SCNC: 4.2 MMOL/L (ref 3.5–5.2)
PROT SERPL-MCNC: 6.4 G/DL (ref 6–8.5)
PROT UR QL STRIP: NEGATIVE
RBC # BLD AUTO: 4.89 10*6/MM3 (ref 3.77–5.28)
RBC # UR STRIP: ABNORMAL /HPF
REF LAB TEST METHOD: ABNORMAL
SODIUM SERPL-SCNC: 135 MMOL/L (ref 136–145)
SP GR UR STRIP: 1.01 (ref 1–1.03)
SQUAMOUS #/AREA URNS HPF: ABNORMAL /HPF
UROBILINOGEN UR QL STRIP: ABNORMAL
WBC # UR STRIP: ABNORMAL /HPF
WBC NRBC COR # BLD AUTO: 9.16 10*3/MM3 (ref 3.4–10.8)
WHOLE BLOOD HOLD COAG: NORMAL
WHOLE BLOOD HOLD SPECIMEN: NORMAL

## 2024-04-14 PROCEDURE — 71045 X-RAY EXAM CHEST 1 VIEW: CPT

## 2024-04-14 PROCEDURE — 25810000003 SODIUM CHLORIDE 0.9 % SOLUTION: Performed by: EMERGENCY MEDICINE

## 2024-04-14 PROCEDURE — 85025 COMPLETE CBC W/AUTO DIFF WBC: CPT

## 2024-04-14 PROCEDURE — 80053 COMPREHEN METABOLIC PANEL: CPT

## 2024-04-14 PROCEDURE — 96375 TX/PRO/DX INJ NEW DRUG ADDON: CPT

## 2024-04-14 PROCEDURE — 81001 URINALYSIS AUTO W/SCOPE: CPT

## 2024-04-14 PROCEDURE — 99285 EMERGENCY DEPT VISIT HI MDM: CPT

## 2024-04-14 PROCEDURE — 96374 THER/PROPH/DIAG INJ IV PUSH: CPT

## 2024-04-14 PROCEDURE — 25510000001 IOPAMIDOL 61 % SOLUTION: Performed by: EMERGENCY MEDICINE

## 2024-04-14 PROCEDURE — 25010000002 KETOROLAC TROMETHAMINE PER 15 MG: Performed by: EMERGENCY MEDICINE

## 2024-04-14 PROCEDURE — 83690 ASSAY OF LIPASE: CPT

## 2024-04-14 PROCEDURE — 83605 ASSAY OF LACTIC ACID: CPT

## 2024-04-14 PROCEDURE — 74177 CT ABD & PELVIS W/CONTRAST: CPT

## 2024-04-14 PROCEDURE — 25010000002 MORPHINE PER 10 MG: Performed by: EMERGENCY MEDICINE

## 2024-04-14 PROCEDURE — 36415 COLL VENOUS BLD VENIPUNCTURE: CPT

## 2024-04-14 RX ORDER — OXYCODONE HYDROCHLORIDE AND ACETAMINOPHEN 5; 325 MG/1; MG/1
1 TABLET ORAL EVERY 6 HOURS PRN
Qty: 12 TABLET | Refills: 0 | Status: SHIPPED | OUTPATIENT
Start: 2024-04-14

## 2024-04-14 RX ORDER — KETOROLAC TROMETHAMINE 30 MG/ML
15 INJECTION, SOLUTION INTRAMUSCULAR; INTRAVENOUS ONCE
Status: COMPLETED | OUTPATIENT
Start: 2024-04-14 | End: 2024-04-14

## 2024-04-14 RX ORDER — CEFUROXIME AXETIL 250 MG/1
250 TABLET ORAL 2 TIMES DAILY
Qty: 14 TABLET | Refills: 0 | Status: SHIPPED | OUTPATIENT
Start: 2024-04-14 | End: 2024-04-21

## 2024-04-14 RX ORDER — ONDANSETRON 4 MG/1
4 TABLET, FILM COATED ORAL EVERY 6 HOURS PRN
Qty: 20 TABLET | Refills: 0 | Status: SHIPPED | OUTPATIENT
Start: 2024-04-14 | End: 2024-04-19

## 2024-04-14 RX ORDER — SODIUM CHLORIDE 0.9 % (FLUSH) 0.9 %
10 SYRINGE (ML) INJECTION AS NEEDED
Status: DISCONTINUED | OUTPATIENT
Start: 2024-04-14 | End: 2024-04-14 | Stop reason: HOSPADM

## 2024-04-14 RX ORDER — CEFUROXIME AXETIL 250 MG/1
250 TABLET ORAL ONCE
Status: COMPLETED | OUTPATIENT
Start: 2024-04-14 | End: 2024-04-14

## 2024-04-14 RX ADMIN — CEFUROXIME AXETIL 250 MG: 250 TABLET ORAL at 19:12

## 2024-04-14 RX ADMIN — IOPAMIDOL 100 ML: 612 INJECTION, SOLUTION INTRAVENOUS at 18:04

## 2024-04-14 RX ADMIN — KETOROLAC TROMETHAMINE 15 MG: 30 INJECTION, SOLUTION INTRAMUSCULAR; INTRAVENOUS at 17:35

## 2024-04-14 RX ADMIN — SODIUM CHLORIDE 1000 ML: 9 INJECTION, SOLUTION INTRAVENOUS at 18:13

## 2024-04-14 RX ADMIN — MORPHINE SULFATE 4 MG: 4 INJECTION, SOLUTION INTRAMUSCULAR; INTRAVENOUS at 17:37

## 2024-04-14 NOTE — ED PROVIDER NOTES
Subjective   History of Present Illness  63-year-old female presents with complaint of right-sided abdominal pain.  The patient reports that she has had intermittent right-sided abdominal pain for 2 to 3 weeks.  It became significant more severe today.  She also reports increased diarrhea and nausea today.  No reported blood in the stool.  The pain at times does radiate to the right flank and at times does radiate to the groin.  It however primarily stays over the right lateral abdomen.  Previous abdominal surgeries include tubal ligation and cholecystectomy.  She still has her appendix and has not had any surgery to her bowel.  No dysuria.  No upper respiratory symptoms.  No sick contacts.  She does report when the pain in the right side is severe she may have some mild chest pain but denies this being a consistent problem.  No other acute complaints.      Review of Systems   Constitutional:  Positive for activity change, appetite change and fatigue. Negative for chills and fever.   HENT:  Negative for congestion, ear pain, postnasal drip, sinus pressure and sore throat.    Eyes:  Negative for pain, redness and visual disturbance.   Respiratory:  Negative for cough, chest tightness and shortness of breath.    Cardiovascular:  Positive for chest pain. Negative for palpitations and leg swelling.   Gastrointestinal:  Positive for abdominal pain and nausea. Negative for anal bleeding, blood in stool, diarrhea and vomiting.   Endocrine: Negative for polydipsia and polyuria.   Genitourinary:  Positive for flank pain. Negative for difficulty urinating, dysuria, frequency and urgency.   Musculoskeletal:  Negative for arthralgias, back pain and neck pain.   Skin:  Negative for pallor and rash.   Allergic/Immunologic: Negative for environmental allergies and immunocompromised state.   Neurological:  Negative for dizziness, weakness and headaches.   Hematological:  Negative for adenopathy.   Psychiatric/Behavioral:  Negative  for confusion, self-injury and suicidal ideas. The patient is not nervous/anxious.    All other systems reviewed and are negative.      Past Medical History:   Diagnosis Date    Cholelithiasis     Removed in 1996    Colon polyp 12/2019    Recheck i 3 years    Diabetes mellitus 2017    type II    Diverticulosis 2007    Elevated cholesterol     Gestational hypertension 9/1999    Hypertension     Multiple gestation 1999    1 still born, 2 live burths    Obesity     Ovarian cyst 1999    Pelvic prolapse 2001    Preeclampsia 1999    Varicella 1968       No Known Allergies    Past Surgical History:   Procedure Laterality Date    CHOLECYSTECTOMY  1998    COLONOSCOPY  12/27/2019    COLONOSCOPY N/A 05/04/2023    Procedure: COLONOSCOPY with polypectomy x 5;  Surgeon: Breanne Smith MD;  Location: [facility];  Service: Gastroenterology;  Laterality: N/A;    KNEE ARTHROSCOPY Left     LAPAROSCOPIC CHOLECYSTECTOMY  1998    TONSILLECTOMY  1983    TUBAL ABDOMINAL LIGATION  2006    VAGINAL PROLAPSE REPAIR  2006       Family History   Problem Relation Age of Onset    Cervical cancer Mother     Hyperlipidemia Mother     Stroke Mother     Hypertension Mother     Cancer Father         Brain tumor - unsure if primary or not    Hyperlipidemia Father     Arthritis Brother     Lung cancer Brother     Heart disease Brother     Hyperlipidemia Brother     Hypertension Brother     Diabetes Brother     Hyperlipidemia Brother     Kidney disease Brother     Hypertension Brother     Heart disease Brother     Hyperlipidemia Brother     Hypertension Brother     Hyperlipidemia Brother     Celiac disease Daughter     Fibromyalgia Daughter     Other Daughter         Celiac, fibromialgia    Sjogren's syndrome Daughter     Other Daughter         Sjorgen disease, mixed connective disorder, hyper mobility    Colon cancer Neg Hx     Breast cancer Neg Hx        Social History     Socioeconomic History    Marital status:    Tobacco Use     Smoking status: Former     Current packs/day: 0.00     Average packs/day: 1.5 packs/day for 25.6 years (38.4 ttl pk-yrs)     Types: Cigarettes     Start date: 1986     Quit date: 2012     Years since quittin.2     Passive exposure: Never    Smokeless tobacco: Never   Vaping Use    Vaping status: Never Used   Substance and Sexual Activity    Alcohol use: Never    Drug use: Never    Sexual activity: Yes     Partners: Male     Birth control/protection: Post-menopausal, Tubal ligation           Objective   Physical Exam  Vitals and nursing note reviewed.   Constitutional:       General: She is not in acute distress.     Appearance: Normal appearance. She is well-developed. She is not toxic-appearing or diaphoretic.   HENT:      Head: Normocephalic and atraumatic.      Right Ear: External ear normal.      Left Ear: External ear normal.      Nose: Nose normal.   Eyes:      General: Lids are normal.      Pupils: Pupils are equal, round, and reactive to light.   Neck:      Trachea: No tracheal deviation.   Cardiovascular:      Rate and Rhythm: Normal rate and regular rhythm.      Pulses: No decreased pulses.      Heart sounds: Normal heart sounds. No murmur heard.     No friction rub. No gallop.   Pulmonary:      Effort: Pulmonary effort is normal. No respiratory distress.      Breath sounds: Normal breath sounds. No decreased breath sounds, wheezing, rhonchi or rales.   Abdominal:      General: Bowel sounds are normal.      Palpations: Abdomen is soft.      Tenderness: There is abdominal tenderness in the right lower quadrant. There is right CVA tenderness. There is no guarding or rebound.   Musculoskeletal:         General: No deformity. Normal range of motion.      Cervical back: Normal range of motion and neck supple.   Lymphadenopathy:      Cervical: No cervical adenopathy.   Skin:     General: Skin is warm and dry.      Findings: No rash.   Neurological:      Mental Status: She is alert and oriented to  person, place, and time.      Cranial Nerves: No cranial nerve deficit.      Sensory: No sensory deficit.   Psychiatric:         Speech: Speech normal.         Behavior: Behavior normal.         Thought Content: Thought content normal.         Judgment: Judgment normal.         Procedures           ED Course  ED Course as of 04/14/24 1925   Sun Apr 14, 2024   1745 Chest x-ray independently interpreted myself shows enlarged cardiac silhouette most likely secondary to portable upright position.  No acute lung disease. [NS]      ED Course User Index  [NS] Stephanie Bhandari MD                                             Medical Decision Making  Differential diagnosis includes sepsis, urinary tract infection, dehydration, kidney stone, diverticulitis, appendicitis, other unspecified etiology.    Labs show normal white count and H&H, normal kidney function, hyperglycemia consistent with known diabetes, no signs of DKA, no significant electrolyte abnormalities.    Urine shows large leuk esterase with 2+ bacteria and too numerous to count white cells concerning for urinary tract infection.    Chest x-ray independently inter by myself shows no acute lung disease    CT scan of the abdomen pelvis with contrast shows urinary bladder wall thickening which correlates with cystitis.  No signs of appendicitis.    The patient has remained well-appearing here in the ER.  She was given IV fluids, pain medication, and the first dose of antibiotics in the form of Ceftin.    The patient will be discharged with a prescription for Ceftin, pain medication, and Zofran.    Advised to rest, drink plenty of fluid, and follow-up with primary care physician for recheck in 1 week.    Advised to return to the ER with any further concern.    Problems Addressed:  Acute pyelonephritis: complicated acute illness or injury with systemic symptoms  Acute right flank pain: complicated acute illness or injury with systemic symptoms    Amount and/or  Complexity of Data Reviewed  Independent Historian: spouse  External Data Reviewed: labs and radiology.  Labs: ordered. Decision-making details documented in ED Course.  Radiology: ordered and independent interpretation performed. Decision-making details documented in ED Course.    Risk  Prescription drug management.        Final diagnoses:   Acute pyelonephritis   Acute right flank pain       ED Disposition  ED Disposition       ED Disposition   Discharge    Condition   Stable    Comment   --               Rey Oconnella, APRN  [address] [facility] [address] [phone]    In 1 week           Medication List        New Prescriptions      cefuroxime 250 MG tablet  Commonly known as: CEFTIN  Take 1 tablet by mouth 2 (Two) Times a Day for 7 days.     ondansetron 4 MG tablet  Commonly known as: ZOFRAN  Take 1 tablet by mouth Every 6 (Six) Hours As Needed for Nausea for up to 5 days.     oxyCODONE-acetaminophen 5-325 MG per tablet  Commonly known as: PERCOCET  Take 1 tablet by mouth Every 6 (Six) Hours As Needed for Moderate Pain or Severe Pain for up to 12 doses.               Where to Get Your Medications        These medications were sent to [id number] - [place] - [address] AT [facility] [address] - [phone] PH - [phone] FX  [address], [place] [address]      Phone: [phone]   cefuroxime 250 MG tablet  ondansetron 4 MG tablet  oxyCODONE-acetaminophen 5-325 MG per tablet            Stephanie Bhandari MD  04/14/24 0718

## 2024-04-14 NOTE — Clinical Note
[facility] EMERGENCY DEPARTMENT  [address] [address]  Phone: [phone]    Silva Maldonado was seen and treated in our emergency department on 4/14/2024.  She may return to work on 04/17/2024.         Thank you for choosing [facility].    Stephanie Bhandari MD

## 2024-04-14 NOTE — DISCHARGE INSTRUCTIONS
Take pain medication as prescribed.    Take pain medication loss medication as needed.    Make sure to drink plenty of fluids.    Follow-up with primary care physician for recheck in 1 week.

## 2024-04-18 ENCOUNTER — OFFICE VISIT (OUTPATIENT)
Dept: FAMILY MEDICINE CLINIC | Facility: CLINIC | Age: 64
End: 2024-04-18
Payer: COMMERCIAL

## 2024-04-18 VITALS
BODY MASS INDEX: 43.5 KG/M2 | SYSTOLIC BLOOD PRESSURE: 126 MMHG | DIASTOLIC BLOOD PRESSURE: 82 MMHG | OXYGEN SATURATION: 97 % | WEIGHT: 236.4 LBS | HEIGHT: 62 IN | HEART RATE: 93 BPM | RESPIRATION RATE: 16 BRPM | TEMPERATURE: 97.6 F

## 2024-04-18 DIAGNOSIS — E11.42 TYPE 2 DIABETES MELLITUS WITH DIABETIC POLYNEUROPATHY, WITHOUT LONG-TERM CURRENT USE OF INSULIN: ICD-10-CM

## 2024-04-18 DIAGNOSIS — N30.01 ACUTE CYSTITIS WITH HEMATURIA: ICD-10-CM

## 2024-04-18 DIAGNOSIS — Z09 ENCOUNTER FOR EXAMINATION FOLLOWING TREATMENT AT HOSPITAL: Primary | ICD-10-CM

## 2024-04-18 RX ORDER — PHENAZOPYRIDINE HYDROCHLORIDE 200 MG/1
200 TABLET, FILM COATED ORAL 3 TIMES DAILY PRN
Qty: 6 TABLET | Refills: 0 | Status: SHIPPED | OUTPATIENT
Start: 2024-04-18

## 2024-04-18 RX ORDER — SEMAGLUTIDE 1.34 MG/ML
1 INJECTION, SOLUTION SUBCUTANEOUS WEEKLY
Qty: 3 ML | Refills: 2 | Status: SHIPPED | OUTPATIENT
Start: 2024-04-18

## 2024-04-18 NOTE — PROGRESS NOTES
"                      Established Patient        Chief Complaint:   Chief Complaint   Patient presents with    Flank Pain     ER FOLLOW UP FOR FLANK PAIN.            History of Present Illness:    Silva Maldonado is a 63 y.o. female who presents today for ED follow up.     Patient presented to [facility] ED on 4/14/24 for c/o right sided abdominal pain.     UA revealed large amount of leuk esterase with 2+ bacteria and WBCs TNTC     CT scan of abdomen/pelvis noted urinary bladder wall thickening.     Diagnosed with acute pyelonephritis and discharged with Rx for ceftin, percocet, and zofran.     Patient states that she is still having some right-sided suprapubic pain but has improved. States that she has not had any dysuria.     Patient also reports increased nausea with increased dosing of Ozempic.     Subjective     The following portions of the patient's history were reviewed and updated as appropriate: allergies, current medications, past family history, past medical history, past social history, past surgical history and problem list.    ALLERGIES  No Known Allergies    ROS  Review of Systems   Constitutional:  Negative for fever.   Gastrointestinal:  Positive for abdominal pain. Negative for nausea and vomiting.   Genitourinary:  Positive for pelvic pain. Negative for dysuria, flank pain and hematuria.       Objective     Vital Signs:   /82   Pulse 93   Temp 97.6 °F (36.4 °C)   Resp 16   Ht 157.5 cm (62\")   Wt 107 kg (236 lb 6.4 oz)   SpO2 97%   BMI 43.24 kg/m²                Physical Exam   Physical Exam  Vitals and nursing note reviewed.   Constitutional:       Appearance: She is obese.   Cardiovascular:      Rate and Rhythm: Normal rate and regular rhythm.   Pulmonary:      Effort: Pulmonary effort is normal.   Abdominal:      General: Bowel sounds are normal. There is no distension.      Palpations: Abdomen is soft.      Tenderness: There is no abdominal tenderness. There is no right CVA tenderness " or left CVA tenderness.   Neurological:      Mental Status: She is alert and oriented to person, place, and time.   Psychiatric:         Mood and Affect: Mood normal.         Behavior: Behavior normal.         Assessment and Plan      Assessment/Plan:   Diagnoses and all orders for this visit:    1. Encounter for examination following treatment at hospital (Primary)    2. Acute cystitis with hematuria    3. Type 2 diabetes mellitus with diabetic polyneuropathy, without long-term current use of insulin  -     Semaglutide, 1 MG/DOSE, (Ozempic, 1 MG/DOSE,) 4 MG/3ML solution pen-injector; Inject 1 mg under the skin into the appropriate area as directed 1 (One) Time Per Week.  Dispense: 3 mL; Refill: 2    Patient to continue antibiotics as precribed upon hospital discharge.     May alternate Acetaminophen and Ibuprofen every 4-6 hours as needed for pain.    Discussed with patient decreasing Ozempic dosing to decrease AE of nausea, patient agreeable.     Risks, benefits, and potential side effects of current/new medications reviewed with patient.  Patient voiced understanding and wished to proceed with treatment.    Patient was encouraged to keep me informed of any acute changes, lack of improvement, or any new concerning symptoms.    Patient to return to clinic for fever, confusion, hematuria.      Discussion Summary:  Discussed plan of care in detail with pt today; pt verb understanding and agrees.      I spent 30 minutes caring for Silva on this date of service. This time includes time spent by me in the following activities:preparing for the visit, reviewing tests, obtaining and/or reviewing a separately obtained history, performing a medically appropriate examination and/or evaluation , counseling and educating the patient/family/caregiver, ordering medications, tests, or procedures, and documenting information in the medical record      I have reviewed and updated all copied forward information, as appropriate.   I attest to the accuracy and relevance of any unchanged information.      Follow up:  No follow-ups on file.     Patient Education:  There are no Patient Instructions on file for this visit.    ROSY Cruz  04/18/24  14:59 EDT          Please note that portions of this note may have been completed with a voice recognition program.

## 2024-07-12 ENCOUNTER — OFFICE VISIT (OUTPATIENT)
Dept: FAMILY MEDICINE CLINIC | Facility: CLINIC | Age: 64
End: 2024-07-12
Payer: COMMERCIAL

## 2024-07-12 VITALS
TEMPERATURE: 98 F | WEIGHT: 231 LBS | SYSTOLIC BLOOD PRESSURE: 126 MMHG | BODY MASS INDEX: 42.51 KG/M2 | HEIGHT: 62 IN | RESPIRATION RATE: 16 BRPM | OXYGEN SATURATION: 98 % | HEART RATE: 84 BPM | DIASTOLIC BLOOD PRESSURE: 84 MMHG

## 2024-07-12 DIAGNOSIS — E78.2 MIXED HYPERLIPIDEMIA: ICD-10-CM

## 2024-07-12 DIAGNOSIS — E11.65 TYPE 2 DIABETES MELLITUS WITH HYPERGLYCEMIA, WITHOUT LONG-TERM CURRENT USE OF INSULIN: Primary | ICD-10-CM

## 2024-07-12 DIAGNOSIS — I10 PRIMARY HYPERTENSION: ICD-10-CM

## 2024-07-12 DIAGNOSIS — E78.1 HYPERTRIGLYCERIDEMIA: ICD-10-CM

## 2024-07-12 LAB
EXPIRATION DATE: ABNORMAL
HBA1C MFR BLD: 9 % (ref 4.5–5.7)
Lab: ABNORMAL

## 2024-07-12 PROCEDURE — 99214 OFFICE O/P EST MOD 30 MIN: CPT | Performed by: NURSE PRACTITIONER

## 2024-07-12 PROCEDURE — 83036 HEMOGLOBIN GLYCOSYLATED A1C: CPT | Performed by: NURSE PRACTITIONER

## 2024-07-12 RX ORDER — SIMVASTATIN 20 MG
20 TABLET ORAL EVERY EVENING
Qty: 30 TABLET | Refills: 3 | Status: SHIPPED | OUTPATIENT
Start: 2024-07-12

## 2024-07-12 RX ORDER — LOSARTAN POTASSIUM 25 MG/1
25 TABLET ORAL DAILY
Qty: 30 TABLET | Refills: 2 | Status: SHIPPED | OUTPATIENT
Start: 2024-07-12

## 2024-07-12 NOTE — PROGRESS NOTES
"                      Established Patient        Chief Complaint:   Chief Complaint   Patient presents with    Annual Exam     Pt is here for physical            History of Present Illness:    Silva Maldonado is a 63 y.o. female who presents today for yearly physical and follow up on HTN, T2DM, hyperlipidemia.    HTN-- losartan 25mg.  Tolerating well. Checks blood pressures at home a few time a week. Typically runs 120s-130s/70s-80s. Denies chest pain, palpitations, SOB. Denies edema, wears compression socks.     T2DM-- Jardiance 10mg. Ozempic. BG at home runs 150s-200s. Tolerates well when taken in the am.  Denies polyuria, polydipsia, polyphagia, frequent hypoglycemic episodes.    Hyperlipidemia-- Simvastatin 20mg, Tricor 145mg. Tolerating well. Notes that if she doesn't drink enough water can make her feel run down, but no AE overall.     Subjective     The following portions of the patient's history were reviewed and updated as appropriate: allergies, current medications, past family history, past medical history, past social history, past surgical history and problem list.    ALLERGIES  No Known Allergies    ROS  Review of Systems   Constitutional:  Negative for diaphoresis.   Respiratory:  Negative for shortness of breath.    Cardiovascular:  Negative for chest pain and palpitations.   Gastrointestinal:  Negative for abdominal distention, abdominal pain, constipation, diarrhea, nausea and vomiting.   Endocrine: Negative for polydipsia, polyphagia and polyuria.   Neurological:  Negative for dizziness and headaches.   Psychiatric/Behavioral:  Negative for sleep disturbance.        Objective     Vital Signs:   /84   Pulse 84   Temp 98 °F (36.7 °C)   Resp 16   Ht 157.5 cm (62\")   Wt 105 kg (231 lb)   SpO2 98%   BMI 42.25 kg/m²                Physical Exam   Physical Exam  Vitals and nursing note reviewed.   Constitutional:       Appearance: She is obese.   Neck:      Thyroid: No thyroid mass or " thyromegaly.      Vascular: No carotid bruit or JVD.   Cardiovascular:      Rate and Rhythm: Normal rate and regular rhythm.   Pulmonary:      Effort: Pulmonary effort is normal.      Breath sounds: Normal breath sounds.   Neurological:      Mental Status: She is alert and oriented to person, place, and time.   Psychiatric:         Mood and Affect: Mood normal.         Behavior: Behavior normal.         Assessment and Plan      Assessment/Plan:   Diagnoses and all orders for this visit:    1. Type 2 diabetes mellitus with hyperglycemia, without long-term current use of insulin (Primary)  -     POC Glycosylated Hemoglobin (Hb A1C)    2. Primary hypertension  -     losartan (COZAAR) 25 MG tablet; Take 1 tablet by mouth Daily.  Dispense: 30 tablet; Refill: 2    3. Mixed hyperlipidemia  -     simvastatin (ZOCOR) 20 MG tablet; Take 1 tablet by mouth Every Evening.  Dispense: 30 tablet; Refill: 3    4. Hypertriglyceridemia  -     fenofibrate (Tricor) 145 MG tablet; Take 1 tablet by mouth Daily.  Dispense: 30 tablet; Refill: 5    Discussed need for reduction in sodium/salt/caffeine intake; improve sleep habits as able; inc formal CV exercise program with goal of vigorous activity most, if not all, days of the week.     Ambulatory blood pressure monitoring encouraged prior to taking medication daily and as needed.    Contact office for symptomatic HTN or consistently uncontrolled HTN.     Patient to increase dietary intake of vegetables, fruits, nuts, whole grains and fish. Decrease dietary intake of carbs, processed foods such as lunch meats, saturated fats, sugary beverages.     Increase exercise regimen as tolerated toward a goal of 120-150 minutes/week.     Patient to present to ED for chest pain, confusion, vision disturbance.     Encouraged ambulatory glucose monitoring. Patient to call office or RTC for glucose levels consistently greater than 300 or poorly controlled with current medication regimen.     Patient was  encouraged to keep me informed of any acute changes, lack of improvement, or any new concerning symptoms.      Discussion Summary:  Discussed plan of care in detail with pt today; pt verb understanding and agrees.    I, ROSY Cruz, personally performed the services described in this documentation, as scribed by Myra Vasquez, ROSY student, in my presence, and is both accurate and complete.     I have reviewed and updated all copied forward information, as appropriate.  I attest to the accuracy and relevance of any unchanged information.      Follow up:  Return in about 3 months (around 10/12/2024).     Patient Education:  There are no Patient Instructions on file for this visit.    ROSY Cruz  07/28/24  18:59 EDT          Please note that portions of this note may have been completed with a voice recognition program.

## 2024-07-20 DIAGNOSIS — E11.42 TYPE 2 DIABETES MELLITUS WITH DIABETIC POLYNEUROPATHY, WITHOUT LONG-TERM CURRENT USE OF INSULIN: ICD-10-CM

## 2024-07-23 RX ORDER — EMPAGLIFLOZIN 10 MG/1
10 TABLET, FILM COATED ORAL DAILY
Qty: 30 TABLET | Refills: 2 | Status: SHIPPED | OUTPATIENT
Start: 2024-07-23

## 2024-07-28 RX ORDER — FENOFIBRATE 145 MG/1
145 TABLET, COATED ORAL DAILY
Qty: 30 TABLET | Refills: 5 | Status: SHIPPED | OUTPATIENT
Start: 2024-07-28

## 2024-07-28 RX ORDER — SIMVASTATIN 20 MG
20 TABLET ORAL EVERY EVENING
Qty: 30 TABLET | Refills: 3 | OUTPATIENT
Start: 2024-07-28

## 2024-07-28 RX ORDER — LOSARTAN POTASSIUM 25 MG/1
25 TABLET ORAL DAILY
Qty: 30 TABLET | Refills: 2 | Status: SHIPPED | OUTPATIENT
Start: 2024-07-28

## 2024-07-28 RX ORDER — SIMVASTATIN 20 MG
20 TABLET ORAL EVERY EVENING
Qty: 30 TABLET | Refills: 3 | Status: SHIPPED | OUTPATIENT
Start: 2024-07-28

## 2024-08-14 ENCOUNTER — TELEPHONE (OUTPATIENT)
Dept: FAMILY MEDICINE CLINIC | Facility: CLINIC | Age: 64
End: 2024-08-14

## 2024-08-15 ENCOUNTER — TELEPHONE (OUTPATIENT)
Dept: FAMILY MEDICINE CLINIC | Facility: CLINIC | Age: 64
End: 2024-08-15

## 2024-08-15 NOTE — TELEPHONE ENCOUNTER
Caller: Silva Maldonado    Relationship: Self    Best call back number: [phone]       What was the call regarding: INSURANCE IS SAYING SHE NEEDS A PHYSICAL BUT ITS SHOWING SHE HAD ONE ON 7.14.24. NOT SURE HOW TO FIX THIS BUT HER INSURANCE WILL DOUBLE IF WE DONT SEND TO THEM TO LET THEM KNOW SHE'S HAD ONE. PLEASE CALL TO DISCUSS

## 2024-08-20 ENCOUNTER — OFFICE VISIT (OUTPATIENT)
Dept: FAMILY MEDICINE CLINIC | Facility: CLINIC | Age: 64
End: 2024-08-20
Payer: COMMERCIAL

## 2024-08-20 ENCOUNTER — HOSPITAL ENCOUNTER (OUTPATIENT)
Dept: CT IMAGING | Facility: HOSPITAL | Age: 64
Discharge: HOME | End: 2024-08-20
Admitting: NURSE PRACTITIONER
Payer: COMMERCIAL

## 2024-08-20 VITALS
WEIGHT: 230 LBS | SYSTOLIC BLOOD PRESSURE: 138 MMHG | TEMPERATURE: 98 F | HEART RATE: 84 BPM | RESPIRATION RATE: 16 BRPM | HEIGHT: 62 IN | BODY MASS INDEX: 42.33 KG/M2 | DIASTOLIC BLOOD PRESSURE: 86 MMHG | OXYGEN SATURATION: 98 %

## 2024-08-20 DIAGNOSIS — R14.0 ABDOMINAL BLOATING: ICD-10-CM

## 2024-08-20 DIAGNOSIS — R11.0 NAUSEA: ICD-10-CM

## 2024-08-20 DIAGNOSIS — N20.0 KIDNEY STONE ON LEFT SIDE: ICD-10-CM

## 2024-08-20 DIAGNOSIS — R10.11 RIGHT UPPER QUADRANT ABDOMINAL PAIN: ICD-10-CM

## 2024-08-20 DIAGNOSIS — R10.13 EPIGASTRIC PAIN: ICD-10-CM

## 2024-08-20 DIAGNOSIS — K59.03 DRUG-INDUCED CONSTIPATION: ICD-10-CM

## 2024-08-20 DIAGNOSIS — K76.0 FATTY LIVER: ICD-10-CM

## 2024-08-20 DIAGNOSIS — R14.0 ABDOMINAL BLOATING: Primary | ICD-10-CM

## 2024-08-20 PROCEDURE — 74176 CT ABD & PELVIS W/O CONTRAST: CPT

## 2024-08-20 PROCEDURE — 99214 OFFICE O/P EST MOD 30 MIN: CPT | Performed by: NURSE PRACTITIONER

## 2024-08-20 NOTE — PROGRESS NOTES
"                      Established Patient        Chief Complaint:   Chief Complaint   Patient presents with    Rib Injury     Pt is here for rib pain in right side and having nausea and constipation            History of Present Illness:    Silva Maldonado is a 63 y.o. female who presents today     Onset about 2 weeks ago with pain on right side, constipation. Took miralax and it would subside.     Now feels full, bloated, persistent pain under right ribs. Constant x 2-3 days. Persistent nausea, heartburn, denies vomiting. Feels like she needs to pass gas but nothing comes out.     History of cholecystectomy.     Has been taking stool softener and miralax. Has not had BM x 2 days this is normal for patient.     Subjective     The following portions of the patient's history were reviewed and updated as appropriate: allergies, current medications, past family history, past medical history, past social history, past surgical history and problem list.    ALLERGIES  No Known Allergies    ROS  Review of Systems   Constitutional:  Negative for fever.   Respiratory:  Negative for shortness of breath.    Gastrointestinal:  Positive for abdominal distention, abdominal pain, constipation and nausea. Negative for diarrhea and vomiting.   Genitourinary:  Negative for difficulty urinating and dysuria.       Objective     Vital Signs:   /86   Pulse 84   Temp 98 °F (36.7 °C)   Resp 16   Ht 157.5 cm (62\")   Wt 104 kg (230 lb)   SpO2 98%   BMI 42.07 kg/m²                Physical Exam   Physical Exam  Vitals and nursing note reviewed.   Cardiovascular:      Rate and Rhythm: Normal rate.   Pulmonary:      Effort: Pulmonary effort is normal.      Breath sounds: Normal breath sounds.   Abdominal:      General: Bowel sounds are normal.      Palpations: Abdomen is soft.      Tenderness: There is abdominal tenderness in the right upper quadrant, right lower quadrant and epigastric area. There is guarding. There is no right CVA " tenderness, left CVA tenderness or rebound.      Hernia: No hernia is present.   Neurological:      Mental Status: She is alert and oriented to person, place, and time.   Psychiatric:         Mood and Affect: Mood normal.         Behavior: Behavior normal.         Assessment and Plan      Assessment/Plan:   Diagnoses and all orders for this visit:    1. Abdominal bloating (Primary)  -     XR Abdomen Flat & Upright (In Office)  -     CT Abdomen Pelvis Without Contrast; Future    2. Right upper quadrant abdominal pain  -     XR Abdomen Flat & Upright (In Office)  -     CT Abdomen Pelvis Without Contrast; Future    3. Nausea    4. Epigastric pain  -     CT Abdomen Pelvis Without Contrast; Future    5. Drug-induced constipation    I will contact patient regarding test results and provide instructions regarding any necessary changes in plan of care.    Patient was encouraged to keep me informed of any acute changes, lack of improvement, or any new concerning symptoms.    Patient to present to ED for worsening pain, fever.    Discussion Summary:  Discussed plan of care in detail with pt today; pt verb understanding and agrees.      I spent 30 minutes caring for Silva on this date of service. This time includes time spent by me in the following activities:preparing for the visit, reviewing tests, obtaining and/or reviewing a separately obtained history, performing a medically appropriate examination and/or evaluation , counseling and educating the patient/family/caregiver, ordering medications, tests, or procedures, and documenting information in the medical record      I have reviewed and updated all copied forward information, as appropriate.  I attest to the accuracy and relevance of any unchanged information.      Follow up:  No follow-ups on file.     Patient Education:  There are no Patient Instructions on file for this visit.    ROSY Cruz  08/20/24  09:50 EDT          Please note that portions of  this note may have been completed with a voice recognition program.

## 2024-08-21 ENCOUNTER — APPOINTMENT (OUTPATIENT)
Facility: HOSPITAL | Age: 64
End: 2024-08-21
Payer: COMMERCIAL

## 2024-08-21 ENCOUNTER — HOSPITAL ENCOUNTER (EMERGENCY)
Facility: HOSPITAL | Age: 64
Discharge: HOME | End: 2024-08-21
Attending: STUDENT IN AN ORGANIZED HEALTH CARE EDUCATION/TRAINING PROGRAM
Payer: COMMERCIAL

## 2024-08-21 VITALS
OXYGEN SATURATION: 93 % | BODY MASS INDEX: 40.75 KG/M2 | HEART RATE: 84 BPM | DIASTOLIC BLOOD PRESSURE: 99 MMHG | TEMPERATURE: 98.5 F | SYSTOLIC BLOOD PRESSURE: 151 MMHG | HEIGHT: 63 IN | WEIGHT: 230 LBS | RESPIRATION RATE: 16 BRPM

## 2024-08-21 DIAGNOSIS — R73.9 HYPERGLYCEMIA: ICD-10-CM

## 2024-08-21 DIAGNOSIS — N12 PYELONEPHRITIS: Primary | ICD-10-CM

## 2024-08-21 LAB
ALBUMIN SERPL-MCNC: 4 G/DL (ref 3.5–5.2)
ALBUMIN/GLOB SERPL: 1.4 G/DL
ALP SERPL-CCNC: 99 U/L (ref 39–117)
ALT SERPL W P-5'-P-CCNC: 25 U/L (ref 1–33)
ANION GAP SERPL CALCULATED.3IONS-SCNC: 12.7 MMOL/L (ref 5–15)
AST SERPL-CCNC: 25 U/L (ref 1–32)
BACTERIA UR QL AUTO: ABNORMAL /HPF
BASOPHILS # BLD AUTO: 0.09 10*3/MM3 (ref 0–0.2)
BASOPHILS NFR BLD AUTO: 1 % (ref 0–1.5)
BILIRUB SERPL-MCNC: 0.3 MG/DL (ref 0–1.2)
BILIRUB UR QL STRIP: NEGATIVE
BUN SERPL-MCNC: 14 MG/DL (ref 8–23)
BUN/CREAT SERPL: 18.4 (ref 7–25)
CALCIUM SPEC-SCNC: 8.5 MG/DL (ref 8.6–10.5)
CHLORIDE SERPL-SCNC: 99 MMOL/L (ref 98–107)
CLARITY UR: CLEAR
CO2 SERPL-SCNC: 21.3 MMOL/L (ref 22–29)
COLOR UR: YELLOW
CREAT SERPL-MCNC: 0.76 MG/DL (ref 0.57–1)
D-LACTATE SERPL-SCNC: 1.7 MMOL/L (ref 0.5–2)
D-LACTATE SERPL-SCNC: 2.2 MMOL/L (ref 0.5–2)
DEPRECATED RDW RBC AUTO: 34.3 FL (ref 37–54)
EGFRCR SERPLBLD CKD-EPI 2021: 89.3 ML/MIN/1.73
EOSINOPHIL # BLD AUTO: 0.39 10*3/MM3 (ref 0–0.4)
EOSINOPHIL NFR BLD AUTO: 4.2 % (ref 0.3–6.2)
ERYTHROCYTE [DISTWIDTH] IN BLOOD BY AUTOMATED COUNT: 12.1 % (ref 12.3–15.4)
GLOBULIN UR ELPH-MCNC: 2.8 GM/DL
GLUCOSE SERPL-MCNC: 301 MG/DL (ref 65–99)
GLUCOSE UR STRIP-MCNC: ABNORMAL MG/DL
HCT VFR BLD AUTO: 42 % (ref 34–46.6)
HGB BLD-MCNC: 14.2 G/DL (ref 12–15.9)
HGB UR QL STRIP.AUTO: ABNORMAL
HYALINE CASTS UR QL AUTO: ABNORMAL /LPF
IMM GRANULOCYTES # BLD AUTO: 0.11 10*3/MM3 (ref 0–0.05)
IMM GRANULOCYTES NFR BLD AUTO: 1.2 % (ref 0–0.5)
KETONES UR QL STRIP: NEGATIVE
LEUKOCYTE ESTERASE UR QL STRIP.AUTO: ABNORMAL
LIPASE SERPL-CCNC: 35 U/L (ref 13–60)
LYMPHOCYTES # BLD AUTO: 2.39 10*3/MM3 (ref 0.7–3.1)
LYMPHOCYTES NFR BLD AUTO: 25.8 % (ref 19.6–45.3)
MCH RBC QN AUTO: 26.2 PG (ref 26.6–33)
MCHC RBC AUTO-ENTMCNC: 33.8 G/DL (ref 31.5–35.7)
MCV RBC AUTO: 77.6 FL (ref 79–97)
MONOCYTES # BLD AUTO: 0.67 10*3/MM3 (ref 0.1–0.9)
MONOCYTES NFR BLD AUTO: 7.2 % (ref 5–12)
MUCOUS THREADS URNS QL MICRO: ABNORMAL /HPF
NEUTROPHILS NFR BLD AUTO: 5.61 10*3/MM3 (ref 1.7–7)
NEUTROPHILS NFR BLD AUTO: 60.6 % (ref 42.7–76)
NITRITE UR QL STRIP: POSITIVE
NT-PROBNP SERPL-MCNC: <36 PG/ML (ref 0–900)
PH UR STRIP.AUTO: 5.5 [PH] (ref 5–8)
PLATELET # BLD AUTO: 261 10*3/MM3 (ref 140–450)
PMV BLD AUTO: 10 FL (ref 6–12)
POTASSIUM SERPL-SCNC: 4 MMOL/L (ref 3.5–5.2)
PROT SERPL-MCNC: 6.8 G/DL (ref 6–8.5)
PROT UR QL STRIP: NEGATIVE
QT INTERVAL: 336 MS
QTC INTERVAL: 448 MS
RBC # BLD AUTO: 5.41 10*6/MM3 (ref 3.77–5.28)
RBC # UR STRIP: ABNORMAL /HPF
REF LAB TEST METHOD: ABNORMAL
SODIUM SERPL-SCNC: 133 MMOL/L (ref 136–145)
SP GR UR STRIP: 1.01 (ref 1–1.03)
SQUAMOUS #/AREA URNS HPF: ABNORMAL /HPF
TROPONIN T SERPL HS-MCNC: 13 NG/L
TROPONIN T SERPL HS-MCNC: 17 NG/L
UROBILINOGEN UR QL STRIP: ABNORMAL
WBC # UR STRIP: ABNORMAL /HPF
WBC NRBC COR # BLD AUTO: 9.26 10*3/MM3 (ref 3.4–10.8)

## 2024-08-21 PROCEDURE — 85025 COMPLETE CBC W/AUTO DIFF WBC: CPT | Performed by: STUDENT IN AN ORGANIZED HEALTH CARE EDUCATION/TRAINING PROGRAM

## 2024-08-21 PROCEDURE — 25010000002 KETOROLAC TROMETHAMINE PER 15 MG: Performed by: STUDENT IN AN ORGANIZED HEALTH CARE EDUCATION/TRAINING PROGRAM

## 2024-08-21 PROCEDURE — 83690 ASSAY OF LIPASE: CPT | Performed by: STUDENT IN AN ORGANIZED HEALTH CARE EDUCATION/TRAINING PROGRAM

## 2024-08-21 PROCEDURE — 25510000001 IOPAMIDOL PER 1 ML: Performed by: EMERGENCY MEDICINE

## 2024-08-21 PROCEDURE — 25010000002 MORPHINE PER 10 MG: Performed by: STUDENT IN AN ORGANIZED HEALTH CARE EDUCATION/TRAINING PROGRAM

## 2024-08-21 PROCEDURE — 87040 BLOOD CULTURE FOR BACTERIA: CPT | Performed by: STUDENT IN AN ORGANIZED HEALTH CARE EDUCATION/TRAINING PROGRAM

## 2024-08-21 PROCEDURE — 96375 TX/PRO/DX INJ NEW DRUG ADDON: CPT

## 2024-08-21 PROCEDURE — 87086 URINE CULTURE/COLONY COUNT: CPT | Performed by: STUDENT IN AN ORGANIZED HEALTH CARE EDUCATION/TRAINING PROGRAM

## 2024-08-21 PROCEDURE — 80053 COMPREHEN METABOLIC PANEL: CPT | Performed by: STUDENT IN AN ORGANIZED HEALTH CARE EDUCATION/TRAINING PROGRAM

## 2024-08-21 PROCEDURE — 36415 COLL VENOUS BLD VENIPUNCTURE: CPT

## 2024-08-21 PROCEDURE — 81001 URINALYSIS AUTO W/SCOPE: CPT | Performed by: STUDENT IN AN ORGANIZED HEALTH CARE EDUCATION/TRAINING PROGRAM

## 2024-08-21 PROCEDURE — 25810000003 SODIUM CHLORIDE 0.9 % SOLUTION: Performed by: STUDENT IN AN ORGANIZED HEALTH CARE EDUCATION/TRAINING PROGRAM

## 2024-08-21 PROCEDURE — 25010000002 CEFTRIAXONE PER 250 MG: Performed by: STUDENT IN AN ORGANIZED HEALTH CARE EDUCATION/TRAINING PROGRAM

## 2024-08-21 PROCEDURE — 74174 CTA ABD&PLVS W/CONTRAST: CPT

## 2024-08-21 PROCEDURE — 99285 EMERGENCY DEPT VISIT HI MDM: CPT

## 2024-08-21 PROCEDURE — 93005 ELECTROCARDIOGRAM TRACING: CPT | Performed by: STUDENT IN AN ORGANIZED HEALTH CARE EDUCATION/TRAINING PROGRAM

## 2024-08-21 PROCEDURE — 83880 ASSAY OF NATRIURETIC PEPTIDE: CPT | Performed by: STUDENT IN AN ORGANIZED HEALTH CARE EDUCATION/TRAINING PROGRAM

## 2024-08-21 PROCEDURE — 83605 ASSAY OF LACTIC ACID: CPT | Performed by: STUDENT IN AN ORGANIZED HEALTH CARE EDUCATION/TRAINING PROGRAM

## 2024-08-21 PROCEDURE — 96365 THER/PROPH/DIAG IV INF INIT: CPT

## 2024-08-21 PROCEDURE — 25010000002 ONDANSETRON PER 1 MG: Performed by: STUDENT IN AN ORGANIZED HEALTH CARE EDUCATION/TRAINING PROGRAM

## 2024-08-21 PROCEDURE — 71046 X-RAY EXAM CHEST 2 VIEWS: CPT

## 2024-08-21 PROCEDURE — 84484 ASSAY OF TROPONIN QUANT: CPT | Performed by: STUDENT IN AN ORGANIZED HEALTH CARE EDUCATION/TRAINING PROGRAM

## 2024-08-21 RX ORDER — ONDANSETRON 2 MG/ML
4 INJECTION INTRAMUSCULAR; INTRAVENOUS ONCE
Status: COMPLETED | OUTPATIENT
Start: 2024-08-21 | End: 2024-08-21

## 2024-08-21 RX ORDER — KETOROLAC TROMETHAMINE 15 MG/ML
15 INJECTION, SOLUTION INTRAMUSCULAR; INTRAVENOUS ONCE
Status: COMPLETED | OUTPATIENT
Start: 2024-08-21 | End: 2024-08-21

## 2024-08-21 RX ORDER — CEFDINIR 300 MG/1
300 CAPSULE ORAL 2 TIMES DAILY
Qty: 14 CAPSULE | Refills: 0 | Status: ON HOLD | OUTPATIENT
Start: 2024-08-21 | End: 2024-08-25

## 2024-08-21 RX ORDER — IOPAMIDOL 755 MG/ML
100 INJECTION, SOLUTION INTRAVASCULAR
Status: COMPLETED | OUTPATIENT
Start: 2024-08-21 | End: 2024-08-21

## 2024-08-21 RX ADMIN — SODIUM CHLORIDE 500 ML: 9 INJECTION, SOLUTION INTRAVENOUS at 15:34

## 2024-08-21 RX ADMIN — MORPHINE SULFATE 4 MG: 4 INJECTION, SOLUTION INTRAMUSCULAR; INTRAVENOUS at 15:35

## 2024-08-21 RX ADMIN — CEFTRIAXONE SODIUM 2000 MG: 2 INJECTION, POWDER, FOR SOLUTION INTRAMUSCULAR; INTRAVENOUS at 16:31

## 2024-08-21 RX ADMIN — ONDANSETRON 4 MG: 2 INJECTION INTRAMUSCULAR; INTRAVENOUS at 15:34

## 2024-08-21 RX ADMIN — IOPAMIDOL 94 ML: 755 INJECTION, SOLUTION INTRAVENOUS at 19:20

## 2024-08-21 RX ADMIN — KETOROLAC TROMETHAMINE 15 MG: 15 INJECTION, SOLUTION INTRAMUSCULAR; INTRAVENOUS at 18:45

## 2024-08-21 RX ADMIN — SODIUM CHLORIDE 500 ML: 9 INJECTION, SOLUTION INTRAVENOUS at 18:43

## 2024-08-21 NOTE — FSED PROVIDER NOTE
Subjective  History of Present Illness:    63 year old female hx of DM currently on ozempic who presents with rigth flank pain x 2-3 weeks but she states in the last several days the pain has progressively worsened and is more constant. She reports prior hx of this pain and states it is usually due to constipation and alleviate when she has a bowel movement however she has been taking miralax and had some watery stools but still without relief of pain. She denies fevers, NV, AAA. She saw her PCP yesterday and had CT that showed hepatomegaly and umbilical hernia       Nurses Notes reviewed and agree, including vitals, allergies, social history and prior medical history.     REVIEW OF SYSTEMS: All systems reviewed and not pertinent unless noted.  Review of Systems   All other systems reviewed and are negative.      Past Medical History:   Diagnosis Date    Cholelithiasis     Removed in 1996    Colon polyp 12/2019    Recheck i 3 years    Diabetes mellitus 2017    type II    Diverticulosis 2007    Elevated cholesterol     Gestational hypertension 9/1999    Hypertension     Multiple gestation 1999    1 still born, 2 live burths    Obesity     Ovarian cyst 1999    Pelvic prolapse 2001    Preeclampsia 1999    Varicella 1968       Allergies:    Patient has no known allergies.      Past Surgical History:   Procedure Laterality Date    CHOLECYSTECTOMY  1998    COLONOSCOPY  12/27/2019    COLONOSCOPY N/A 05/04/2023    Procedure: COLONOSCOPY with polypectomy x 5;  Surgeon: Breanne Smith MD;  Location: [facility] ENDOSCOPY;  Service: Gastroenterology;  Laterality: N/A;    KNEE ARTHROSCOPY Left     LAPAROSCOPIC CHOLECYSTECTOMY  1998    TONSILLECTOMY  1983    TUBAL ABDOMINAL LIGATION  2006    VAGINAL PROLAPSE REPAIR  2006         Social History     Socioeconomic History    Marital status:    Tobacco Use    Smoking status: Former     Current packs/day: 0.00     Average packs/day: 1.5 packs/day for 25.6 years (38.4 ttl  "pk-yrs)     Types: Cigarettes     Start date: 1986     Quit date: 2012     Years since quittin.6     Passive exposure: Never    Smokeless tobacco: Never   Vaping Use    Vaping status: Never Used   Substance and Sexual Activity    Alcohol use: Never    Drug use: Never    Sexual activity: Yes     Partners: Male     Birth control/protection: Post-menopausal, Tubal ligation         Family History   Problem Relation Age of Onset    Cervical cancer Mother     Hyperlipidemia Mother     Stroke Mother     Hypertension Mother     Cancer Father         Brain tumor - unsure if primary or not    Hyperlipidemia Father     Arthritis Brother     Lung cancer Brother     Heart disease Brother     Hyperlipidemia Brother     Hypertension Brother     Diabetes Brother     Hyperlipidemia Brother     Kidney disease Brother     Hypertension Brother     Heart disease Brother     Hyperlipidemia Brother     Hypertension Brother     Hyperlipidemia Brother     Celiac disease Daughter     Fibromyalgia Daughter     Other Daughter         Celiac, fibromialgia    Sjogren's syndrome Daughter     Other Daughter         Sjorgen disease, mixed connective disorder, hyper mobility    Colon cancer Neg Hx     Breast cancer Neg Hx        Objective  Physical Exam:  /86   Pulse 83   Temp 98.5 °F (36.9 °C) (Oral)   Resp 16   Ht 160 cm (63\")   Wt 104 kg (230 lb)   SpO2 98%   BMI 40.74 kg/m²      Physical Exam  Constitutional:       Appearance: Normal appearance.   HENT:      Head: Normocephalic and atraumatic.      Nose: Nose normal.      Mouth/Throat:      Mouth: Mucous membranes are moist.   Eyes:      Extraocular Movements: Extraocular movements intact.      Conjunctiva/sclera: Conjunctivae normal.   Cardiovascular:      Rate and Rhythm: Normal rate and regular rhythm.   Pulmonary:      Effort: Pulmonary effort is normal. No respiratory distress.   Abdominal:      General: Abdomen is flat. There is no distension.      Palpations: " Abdomen is soft.      Tenderness: There is no abdominal tenderness. There is no guarding or rebound.      Comments: Atraumatic    Musculoskeletal:         General: Normal range of motion.      Cervical back: Normal range of motion and neck supple.   Skin:     General: Skin is warm and dry.   Neurological:      General: No focal deficit present.      Mental Status: She is alert.      Comments: Moving all extremities spontaneously    Psychiatric:         Mood and Affect: Mood normal.         Behavior: Behavior normal.         Procedures    ED Course:         Lab Results (last 24 hours)       Procedure Component Value Units Date/Time    Urinalysis With Culture If Indicated - Urine, Clean Catch [[id number]]  (Abnormal) Collected: 08/21/24 1516    Specimen: Urine, Clean Catch Updated: 08/21/24 1526     Color, UA Yellow     Appearance, UA Clear     pH, UA 5.5     Specific Gravity, UA 1.015     Glucose, UA >=1000 mg/dL (3+)     Ketones, UA Negative     Bilirubin, UA Negative     Blood, UA Trace     Protein, UA Negative     Leuk Esterase, UA Trace     Nitrite, UA Positive     Urobilinogen, UA 0.2 E.U./dL    Narrative:      In absence of clinical symptoms, the presence of pyuria, bacteria, and/or nitrites on the urinalysis result does not correlate with infection.    Urinalysis, Microscopic Only - Urine, Clean Catch [[id number]]  (Abnormal) Collected: 08/21/24 1516    Specimen: Urine, Clean Catch Updated: 08/21/24 1531     RBC, UA 3-5 /HPF      WBC, UA 21-50 /HPF      Bacteria, UA 2+ /HPF      Squamous Epithelial Cells, UA 0-2 /HPF      Hyaline Casts, UA 3-6 /LPF      Mucus, UA Trace /HPF      Methodology Manual Light Microscopy    CBC & Differential [[id number]]  (Abnormal) Collected: 08/21/24 1528    Specimen: Blood Updated: 08/21/24 1536    Narrative:      The following orders were created for panel order CBC & Differential.  Procedure                               Abnormality         Status                     ---------                                -----------         ------                     CBC Auto Differential[[id number]]        Abnormal            Final result                 Please view results for these tests on the individual orders.    Comprehensive Metabolic Panel [[id number]]  (Abnormal) Collected: 08/21/24 1528    Specimen: Blood Updated: 08/21/24 1614     Glucose 301 mg/dL      BUN 14 mg/dL      Creatinine 0.76 mg/dL      Sodium 133 mmol/L      Potassium 4.0 mmol/L      Comment: Specimen hemolyzed.  Result may be falsely elevated.        Chloride 99 mmol/L      CO2 21.3 mmol/L      Calcium 8.5 mg/dL      Total Protein 6.8 g/dL      Albumin 4.0 g/dL      ALT (SGPT) 25 U/L      AST (SGOT) 25 U/L      Alkaline Phosphatase 99 U/L      Total Bilirubin 0.3 mg/dL      Globulin 2.8 gm/dL      A/G Ratio 1.4 g/dL      BUN/Creatinine Ratio 18.4     Anion Gap 12.7 mmol/L      eGFR 89.3 mL/min/1.73     Narrative:      GFR Normal >60  Chronic Kidney Disease <60  Kidney Failure <15      Lipase [[id number]]  (Normal) Collected: 08/21/24 1528    Specimen: Blood Updated: 08/21/24 1553     Lipase 35 U/L     Lactic Acid, Plasma [[id number]]  (Abnormal) Collected: 08/21/24 1528    Specimen: Blood Updated: 08/21/24 1619     Lactate 2.2 mmol/L     Single High Sensitivity Troponin T [[id number]]  (Abnormal) Collected: 08/21/24 1528    Specimen: Blood Updated: 08/21/24 1551     HS Troponin T 17 ng/L     BNP [[id number]]  (Normal) Collected: 08/21/24 1528    Specimen: Blood Updated: 08/21/24 1551     proBNP <36.0 pg/mL     Narrative:      This assay is used as an aid in the diagnosis of individuals suspected of having heart failure. It can be used as an aid in the diagnosis of acute decompensated heart failure (ADHF) in patients presenting with signs and symptoms of ADHF to the emergency department (ED). In addition, NT-proBNP of <300 pg/mL indicates ADHF is not likely.    Age Range Result Interpretation  NT-proBNP Concentration (pg/mL:      <50              Positive            >450                   Gray                 300-450                    Negative             <300    50-75           Positive            >900                  Gray                300-900                  Negative            <300      >75             Positive            >1800                  Gray                300-1800                  Negative            <300    CBC Auto Differential [[id number]]  (Abnormal) Collected: 08/21/24 1528    Specimen: Blood Updated: 08/21/24 1536     WBC 9.26 10*3/mm3      RBC 5.41 10*6/mm3      Hemoglobin 14.2 g/dL      Hematocrit 42.0 %      MCV 77.6 fL      MCH 26.2 pg      MCHC 33.8 g/dL      RDW 12.1 %      RDW-SD 34.3 fl      MPV 10.0 fL      Platelets 261 10*3/mm3      Neutrophil % 60.6 %      Lymphocyte % 25.8 %      Monocyte % 7.2 %      Eosinophil % 4.2 %      Basophil % 1.0 %      Immature Grans % 1.2 %      Neutrophils, Absolute 5.61 10*3/mm3      Lymphocytes, Absolute 2.39 10*3/mm3      Monocytes, Absolute 0.67 10*3/mm3      Eosinophils, Absolute 0.39 10*3/mm3      Basophils, Absolute 0.09 10*3/mm3      Immature Grans, Absolute 0.11 10*3/mm3     Blood Culture - Blood, Arm, Right [[id number]] Collected: 08/21/24 1555    Specimen: Blood from Arm, Right Updated: 08/21/24 1945    Blood Culture - Blood, Hand, Left [[id number]] Collected: 08/21/24 1605    Specimen: Blood from Hand, Left Updated: 08/21/24 1945    Single High Sensitivity Troponin T [[id number]]  (Normal) Collected: 08/21/24 1843    Specimen: Blood Updated: 08/21/24 1903     HS Troponin T 13 ng/L     Urine Culture - Urine, Urine, Clean Catch [[id number]] Collected: 08/21/24 1936    Specimen: Urine, Clean Catch Updated: 08/21/24 1953    STAT Lactic Acid, Reflex [[id number]]  (Normal) Collected: 08/21/24 1951    Specimen: Blood Updated: 08/21/24 2010     Lactate 1.7 mmol/L              CT Angiogram Abdomen Pelvis    Result Date: 8/21/2024  CT ANGIOGRAM ABDOMEN PELVIS Date of Exam: 8/21/2024 7:04 PM  EDT Indication: right flank pain. Comparison: 4/14/2024 Technique: CTA of the abdomen and pelvis was performed after the uneventful intravenous administration of 90 cc Isovue-370 IV contrast . Reconstructed coronal and sagittal images were also obtained. In addition, a 3-D volume rendered image was created for interpretation. Automated exposure control and iterative reconstruction methods were used. FINDINGS: Lung bases: No masses. No consolidation. Liver:No masses. No intrahepatic biliary ductal dilatation. Spleen:No masses. No perisplenic hematoma. Pancreas:No pancreatic masses. No evidence of pancreatitis. Gallbladder and common bile duct: Prior cholecystectomy Adrenal glands:No adrenal masses Kidneys and ureters:No kidney stones. No renal masses.No calculi present within the ureters. Normal caliber ureters. Urinary bladder:No urinary bladder wall thickening. No bladder masses. Small bowel:Normal caliber small bowel. Large bowel:No diverticulosis or diverticulitis. No large bowel masses are appreciated Appendix: Not seen however there is no evidence of appendicitis. GENITOURINARY: The uterus is normal. Bilateral tubal ligation. Ascites or pneumoperitoneum:None. Adenopathy:None present Osseous structures: Mild degenerative changes of the hips and lumbar spine. Other findings: Small fat-containing umbilical hernia. The descending thoracic aorta is normal. Normal appearance of the celiac axis and superior mesenteric artery. Mild atheromatous disease at the origin of the left renal artery. The inferior mesenteric  artery is normal. Scattered shotty retroperitoneal lymph nodes.     Impression: Mild atheromatous disease. No vessel occlusion or stenosis. No renal or ureteric calculi. No rib fractures. Electronically Signed: Edgar Allen MD  8/21/2024 7:36 PM EDT  Workstation ID: [id number]    XR Chest 2 View    Result Date: 8/21/2024  XR CHEST 2 VW Date of Exam: 8/21/2024 5:24 PM EDT Indication: flank pain Comparison:  Chest radiograph 4/14/2024 Findings: Mediastinum: Cardiomediastinal silhouette appears unchanged. Lungs: Mild patchy left basal opacities. Pleura: No pleural effusion or pneumothorax. Bones and soft tissues: No acute osseous abnormality.     Impression: Impression: Mild patchy left basal opacities which may represent atelectasis. Pneumonia or aspiration can be considered in the appropriate clinical setting. Electronically Signed: Sorin Badillo  8/21/2024 5:35 PM EDT  Workstation ID: [id number]    CT Abdomen Pelvis Without Contrast    Result Date: 8/20/2024  FINAL REPORT TECHNIQUE: null CLINICAL HISTORY: abdominal pain RUQ RIGHT FLANK X SEVERAL WEEKS AND IS NOT GETTING ANY BETTER. COMPARISON: null FINDINGS: CT abdomen and pelvis without contrast Comparison: None Findings: The lung bases are clear. Liver length estimated at 20 cm. Hypodense liver parenchyma. Unremarkable spleen, pancreas and adrenal glands. 1 cm calculus within the left kidney. No ureteral calculus or hydronephrosis. Prior cholecystectomy. No bowel obstruction, pneumoperitoneum, or pneumatosis. Colonic diverticulosis without diverticulitis There are bilateral adnexal surgical clips. Remaining pelvic contents are unremarkable. The appendix is not definitively seen. There are no secondary findings to suggest appendicitis. No acute fracture.     Impression: IMPRESSION: 1. Hepatomegaly with fatty infiltration of the liver. 2. Nonobstructive calculus within the left kidney. 3. There is an umbilical hernia containing fat. Authenticated and Electronically Signed by Uyen Eisenberg MD on 08/20/2024 05:21:40 PM    XR Abdomen Flat & Upright (In Office)    Result Date: 8/20/2024  PROCEDURE: XR ABDOMEN FLAT AND UPRIGHT-  HISTORY: abdominal pain; R14.0-Abdominal distension (gaseous); R10.11-Right upper quadrant pain  COMPARISON: None.  FINDINGS: An AP view of the abdomen and pelvis demonstrates a nonobstructive bowel gas pattern. There are changes from prior  cholecystectomy. There is no abnormal calcification identified.      Impression: Nonobstructive bowel gas pattern.        Images were reviewed, interpreted, and dictated by Dr. Marycruz Maldonado MD Transcribed by Patricia Rivero PA-C.  This report was signed and finalized on 8/20/2024 12:11 PM by Marycruz Maldonado MD.          MDM  Number of Diagnoses or Management Options  Hyperglycemia  Pyelonephritis  Diagnosis management comments: Patient was evaluated and workup was concerning for probable pyelonephritis considering the elevated lactate and flank pain.  The patient was given Rocephin fluid boluses and found to be in improved condition was discharged home to follow-up with her primary care physician on outpatient basis and return to the emergency department any worsening symptoms.  CT scan was up was negative for kidney stone.        DDX: includes but is not limited to: UTI, AAA, MSK strain    Pertinent features from physical exam: abd soft and nontender, VSS.    Initial diagnostic plan: labs, ct abd/pelvis     Results from initial plan were reviewed and interpreted by me revealing EKG shows Q waves to inferior leads. No prior to compare to. Patient has elevated troponin. Will repeat??? She is not having active chest pain  She has mild lactic acidosis in setting of nitrite positive UTI. Suspect pyelonephritis. Will give rocephin here and repeat LA    Diagnostic information from other sources: none    Interventions / Re-evaluation: morphine    Medications   sodium chloride 0.9 % bolus 500 mL (0 mL Intravenous Stopped 8/21/24 1729)   ondansetron (ZOFRAN) injection 4 mg (4 mg Intravenous Given 8/21/24 1534)   morphine injection 4 mg (4 mg Intravenous Given 8/21/24 1535)   cefTRIAXone (ROCEPHIN) 2,000 mg in sodium chloride 0.9 % 100 mL MBP (0 mg Intravenous Stopped 8/21/24 1729)   sodium chloride 0.9 % bolus 500 mL (0 mL Intravenous Stopped 8/21/24 1936)   ketorolac (TORADOL) injection 15 mg (15 mg Intravenous Given 8/21/24  7509)   iopamidol (ISOVUE-370) 76 % injection 100 mL (94 mL Intravenous Given 8/21/24 1920)       Results/clinical rationale were discussed with patient       Data interpreted: Nursing notes reviewed, vital signs reviewed.  Labs independently interpreted by me .  Imaging independently interpreted by me.  EKG independently interpreted by me.      Counseling: Discussed the results above with the patient regarding need for admission or discharge.  Patient understands and agrees plan of care.      -----  ED Disposition       ED Disposition   Discharge    Condition   Stable    Comment   --             Final diagnoses:   Pyelonephritis   Hyperglycemia      Your Follow-Up Providers       Sharmila Oconnell APRN In 2 days.    Specialties: Family Medicine, Nurse Practitioner, Urgent Care, Emergency Medicine  [address] [place], [place] [id number] [phone]                       Contact information for after-discharge care    Follow-up information has not been specified.                    Your medication list        START taking these medications        Instructions Last Dose Given Next Dose Due   cefdinir 300 MG capsule  Commonly known as: OMNICEF      Take 1 capsule by mouth 2 (Two) Times a Day for 7 days.              CONTINUE taking these medications        Instructions Last Dose Given Next Dose Due   Blood Glucose Monitoring Suppl kit      1 each 4 (Four) Times a Day.       fenofibrate 145 MG tablet  Commonly known as: Tricor      Take 1 tablet by mouth Daily.       glucose blood test strip  Commonly known as: Glucose Meter Test      Use as instructed       Jardiance 10 MG tablet tablet  Generic drug: empagliflozin      TAKE 1 TABLET BY MOUTH DAILY       losartan 25 MG tablet  Commonly known as: COZAAR      Take 1 tablet by mouth Daily.       Magnesium 250 MG tablet      Take 1 tablet by mouth At Night As Needed.       oxyCODONE-acetaminophen 5-325 MG per tablet  Commonly known as: PERCOCET      Take 1 tablet by  mouth Every 6 (Six) Hours As Needed for Moderate Pain or Severe Pain for up to 12 doses.       Ozempic (1 MG/DOSE) 4 MG/3ML solution pen-injector  Generic drug: Semaglutide (1 MG/DOSE)      Inject 1 mg under the skin into the appropriate area as directed 1 (One) Time Per Week.       phenazopyridine 200 MG tablet  Commonly known as: Pyridium      Take 1 tablet by mouth 3 (Three) Times a Day As Needed for Bladder Spasms.       simvastatin 20 MG tablet  Commonly known as: ZOCOR      Take 1 tablet by mouth Every Evening.                 Where to Get Your Medications        These medications were sent to [id number] - [place], [place] - [address] AT [facility] [address] - [phone] PH - [phone] FX  [address] [place] [address] [place] [address]      Phone: [phone]   cefdinir 300 MG capsule

## 2024-08-22 ENCOUNTER — HOSPITAL ENCOUNTER (OUTPATIENT)
Facility: HOSPITAL | Age: 64
Setting detail: OBSERVATION
Discharge: HOME | End: 2024-08-25
Attending: EMERGENCY MEDICINE | Admitting: STUDENT IN AN ORGANIZED HEALTH CARE EDUCATION/TRAINING PROGRAM
Payer: COMMERCIAL

## 2024-08-22 DIAGNOSIS — N12 PYELONEPHRITIS: Primary | ICD-10-CM

## 2024-08-22 LAB
ALBUMIN SERPL-MCNC: 3.6 G/DL (ref 3.5–5.2)
ALBUMIN/GLOB SERPL: 1.5 G/DL
ALP SERPL-CCNC: 98 U/L (ref 39–117)
ALT SERPL W P-5'-P-CCNC: 30 U/L (ref 1–33)
ANION GAP SERPL CALCULATED.3IONS-SCNC: 9.3 MMOL/L (ref 5–15)
AST SERPL-CCNC: 24 U/L (ref 1–32)
BACTERIA UR QL AUTO: ABNORMAL /HPF
BASOPHILS # BLD AUTO: 0.07 10*3/MM3 (ref 0–0.2)
BASOPHILS NFR BLD AUTO: 0.9 % (ref 0–1.5)
BILIRUB SERPL-MCNC: 0.3 MG/DL (ref 0–1.2)
BILIRUB UR QL STRIP: NEGATIVE
BILIRUB UR QL STRIP: NEGATIVE
BUN SERPL-MCNC: 15 MG/DL (ref 8–23)
BUN/CREAT SERPL: 20.5 (ref 7–25)
CALCIUM SPEC-SCNC: 8.4 MG/DL (ref 8.6–10.5)
CHLORIDE SERPL-SCNC: 102 MMOL/L (ref 98–107)
CLARITY UR: CLEAR
CLARITY UR: CLEAR
CO2 SERPL-SCNC: 21.7 MMOL/L (ref 22–29)
COLOR UR: YELLOW
COLOR UR: YELLOW
CREAT SERPL-MCNC: 0.73 MG/DL (ref 0.57–1)
D-LACTATE SERPL-SCNC: 1.9 MMOL/L (ref 0.5–2)
DEPRECATED RDW RBC AUTO: 34.3 FL (ref 37–54)
EGFRCR SERPLBLD CKD-EPI 2021: 93.7 ML/MIN/1.73
EOSINOPHIL # BLD AUTO: 0.4 10*3/MM3 (ref 0–0.4)
EOSINOPHIL NFR BLD AUTO: 5 % (ref 0.3–6.2)
ERYTHROCYTE [DISTWIDTH] IN BLOOD BY AUTOMATED COUNT: 11.9 % (ref 12.3–15.4)
GLOBULIN UR ELPH-MCNC: 2.4 GM/DL
GLUCOSE SERPL-MCNC: 297 MG/DL (ref 65–99)
GLUCOSE UR STRIP-MCNC: ABNORMAL MG/DL
GLUCOSE UR STRIP-MCNC: ABNORMAL MG/DL
HBA1C MFR BLD: 10.3 % (ref 4.8–5.6)
HCT VFR BLD AUTO: 38.8 % (ref 34–46.6)
HGB BLD-MCNC: 13 G/DL (ref 12–15.9)
HGB UR QL STRIP.AUTO: ABNORMAL
HGB UR QL STRIP.AUTO: ABNORMAL
HOLD SPECIMEN: NORMAL
HYALINE CASTS UR QL AUTO: ABNORMAL /LPF
IMM GRANULOCYTES # BLD AUTO: 0.08 10*3/MM3 (ref 0–0.05)
IMM GRANULOCYTES NFR BLD AUTO: 1 % (ref 0–0.5)
KETONES UR QL STRIP: NEGATIVE
KETONES UR QL STRIP: NEGATIVE
LEUKOCYTE ESTERASE UR QL STRIP.AUTO: NEGATIVE
LEUKOCYTE ESTERASE UR QL STRIP.AUTO: NEGATIVE
LIPASE SERPL-CCNC: 31 U/L (ref 13–60)
LYMPHOCYTES # BLD AUTO: 2.25 10*3/MM3 (ref 0.7–3.1)
LYMPHOCYTES NFR BLD AUTO: 28.1 % (ref 19.6–45.3)
MCH RBC QN AUTO: 25.8 PG (ref 26.6–33)
MCHC RBC AUTO-ENTMCNC: 33.5 G/DL (ref 31.5–35.7)
MCV RBC AUTO: 77.1 FL (ref 79–97)
MONOCYTES # BLD AUTO: 0.6 10*3/MM3 (ref 0.1–0.9)
MONOCYTES NFR BLD AUTO: 7.5 % (ref 5–12)
NEUTROPHILS NFR BLD AUTO: 4.62 10*3/MM3 (ref 1.7–7)
NEUTROPHILS NFR BLD AUTO: 57.5 % (ref 42.7–76)
NITRITE UR QL STRIP: NEGATIVE
NITRITE UR QL STRIP: NEGATIVE
PH UR STRIP.AUTO: 6 [PH] (ref 5–8)
PH UR STRIP.AUTO: 6 [PH] (ref 5–8)
PLATELET # BLD AUTO: 238 10*3/MM3 (ref 140–450)
PMV BLD AUTO: 9.9 FL (ref 6–12)
POTASSIUM SERPL-SCNC: 4.2 MMOL/L (ref 3.5–5.2)
PROT SERPL-MCNC: 6 G/DL (ref 6–8.5)
PROT UR QL STRIP: NEGATIVE
PROT UR QL STRIP: NEGATIVE
RBC # BLD AUTO: 5.03 10*6/MM3 (ref 3.77–5.28)
RBC # UR STRIP: ABNORMAL /HPF
REF LAB TEST METHOD: ABNORMAL
SODIUM SERPL-SCNC: 133 MMOL/L (ref 136–145)
SP GR UR STRIP: 1.01 (ref 1–1.03)
SP GR UR STRIP: 1.01 (ref 1–1.03)
SQUAMOUS #/AREA URNS HPF: ABNORMAL /HPF
UROBILINOGEN UR QL STRIP: ABNORMAL
UROBILINOGEN UR QL STRIP: ABNORMAL
WBC # UR STRIP: ABNORMAL /HPF
WBC NRBC COR # BLD AUTO: 8.02 10*3/MM3 (ref 3.4–10.8)
WHOLE BLOOD HOLD COAG: NORMAL
WHOLE BLOOD HOLD SPECIMEN: NORMAL

## 2024-08-22 PROCEDURE — G0378 HOSPITAL OBSERVATION PER HR: HCPCS

## 2024-08-22 PROCEDURE — 80053 COMPREHEN METABOLIC PANEL: CPT | Performed by: EMERGENCY MEDICINE

## 2024-08-22 PROCEDURE — 96375 TX/PRO/DX INJ NEW DRUG ADDON: CPT

## 2024-08-22 PROCEDURE — 83605 ASSAY OF LACTIC ACID: CPT | Performed by: EMERGENCY MEDICINE

## 2024-08-22 PROCEDURE — 87086 URINE CULTURE/COLONY COUNT: CPT | Performed by: PHYSICIAN ASSISTANT

## 2024-08-22 PROCEDURE — 81001 URINALYSIS AUTO W/SCOPE: CPT | Performed by: EMERGENCY MEDICINE

## 2024-08-22 PROCEDURE — 96365 THER/PROPH/DIAG IV INF INIT: CPT

## 2024-08-22 PROCEDURE — 99285 EMERGENCY DEPT VISIT HI MDM: CPT

## 2024-08-22 PROCEDURE — 25810000003 SODIUM CHLORIDE 0.9 % SOLUTION: Performed by: PHYSICIAN ASSISTANT

## 2024-08-22 PROCEDURE — 83036 HEMOGLOBIN GLYCOSYLATED A1C: CPT | Performed by: PHYSICIAN ASSISTANT

## 2024-08-22 PROCEDURE — 85025 COMPLETE CBC W/AUTO DIFF WBC: CPT | Performed by: EMERGENCY MEDICINE

## 2024-08-22 PROCEDURE — 36415 COLL VENOUS BLD VENIPUNCTURE: CPT

## 2024-08-22 PROCEDURE — 83690 ASSAY OF LIPASE: CPT | Performed by: EMERGENCY MEDICINE

## 2024-08-22 PROCEDURE — 25010000002 MORPHINE PER 10 MG: Performed by: EMERGENCY MEDICINE

## 2024-08-22 PROCEDURE — 25010000002 CEFTRIAXONE PER 250 MG: Performed by: PHYSICIAN ASSISTANT

## 2024-08-22 RX ORDER — SODIUM CHLORIDE 9 MG/ML
40 INJECTION, SOLUTION INTRAVENOUS AS NEEDED
Status: DISCONTINUED | OUTPATIENT
Start: 2024-08-22 | End: 2024-08-25 | Stop reason: HOSPADM

## 2024-08-22 RX ORDER — MORPHINE SULFATE 2 MG/ML
2 INJECTION, SOLUTION INTRAMUSCULAR; INTRAVENOUS ONCE
Status: COMPLETED | OUTPATIENT
Start: 2024-08-22 | End: 2024-08-22

## 2024-08-22 RX ORDER — ACETAMINOPHEN 325 MG/1
650 TABLET ORAL EVERY 4 HOURS PRN
Status: DISCONTINUED | OUTPATIENT
Start: 2024-08-22 | End: 2024-08-24 | Stop reason: SDUPTHER

## 2024-08-22 RX ORDER — SODIUM CHLORIDE 0.9 % (FLUSH) 0.9 %
10 SYRINGE (ML) INJECTION EVERY 12 HOURS SCHEDULED
Status: DISCONTINUED | OUTPATIENT
Start: 2024-08-23 | End: 2024-08-25 | Stop reason: HOSPADM

## 2024-08-22 RX ORDER — LOSARTAN POTASSIUM 25 MG/1
25 TABLET ORAL DAILY
Status: DISCONTINUED | OUTPATIENT
Start: 2024-08-23 | End: 2024-08-25 | Stop reason: HOSPADM

## 2024-08-22 RX ORDER — SODIUM CHLORIDE 9 MG/ML
10 INJECTION, SOLUTION INTRAMUSCULAR; INTRAVENOUS; SUBCUTANEOUS AS NEEDED
Status: DISCONTINUED | OUTPATIENT
Start: 2024-08-22 | End: 2024-08-25 | Stop reason: HOSPADM

## 2024-08-22 RX ORDER — SODIUM CHLORIDE 9 MG/ML
100 INJECTION, SOLUTION INTRAVENOUS CONTINUOUS
Status: DISCONTINUED | OUTPATIENT
Start: 2024-08-23 | End: 2024-08-24

## 2024-08-22 RX ORDER — ATORVASTATIN CALCIUM 10 MG/1
10 TABLET, FILM COATED ORAL DAILY
Status: DISCONTINUED | OUTPATIENT
Start: 2024-08-23 | End: 2024-08-25 | Stop reason: HOSPADM

## 2024-08-22 RX ORDER — SODIUM CHLORIDE 0.9 % (FLUSH) 0.9 %
10 SYRINGE (ML) INJECTION AS NEEDED
Status: DISCONTINUED | OUTPATIENT
Start: 2024-08-22 | End: 2024-08-25 | Stop reason: HOSPADM

## 2024-08-22 RX ADMIN — MORPHINE SULFATE 2 MG: 2 INJECTION, SOLUTION INTRAMUSCULAR; INTRAVENOUS at 22:30

## 2024-08-22 RX ADMIN — SODIUM CHLORIDE 1000 ML: 9 INJECTION, SOLUTION INTRAVENOUS at 22:31

## 2024-08-22 RX ADMIN — CEFTRIAXONE SODIUM 2000 MG: 2 INJECTION, POWDER, FOR SOLUTION INTRAMUSCULAR; INTRAVENOUS at 22:35

## 2024-08-23 PROBLEM — N12 PYELONEPHRITIS: Chronic | Status: ACTIVE | Noted: 2024-08-22

## 2024-08-23 LAB
ALBUMIN SERPL-MCNC: 3.6 G/DL (ref 3.5–5.2)
ALBUMIN/GLOB SERPL: 1.6 G/DL
ALP SERPL-CCNC: 92 U/L (ref 39–117)
ALT SERPL W P-5'-P-CCNC: 31 U/L (ref 1–33)
ANION GAP SERPL CALCULATED.3IONS-SCNC: 10.2 MMOL/L (ref 5–15)
AST SERPL-CCNC: 29 U/L (ref 1–32)
BACTERIA SPEC AEROBE CULT: NORMAL
BASOPHILS # BLD AUTO: 0.08 10*3/MM3 (ref 0–0.2)
BASOPHILS NFR BLD AUTO: 1 % (ref 0–1.5)
BILIRUB SERPL-MCNC: 0.2 MG/DL (ref 0–1.2)
BUN SERPL-MCNC: 11 MG/DL (ref 8–23)
BUN/CREAT SERPL: 19.6 (ref 7–25)
CALCIUM SPEC-SCNC: 8.2 MG/DL (ref 8.6–10.5)
CHLORIDE SERPL-SCNC: 105 MMOL/L (ref 98–107)
CO2 SERPL-SCNC: 21.8 MMOL/L (ref 22–29)
CREAT SERPL-MCNC: 0.56 MG/DL (ref 0.57–1)
DEPRECATED RDW RBC AUTO: 33.9 FL (ref 37–54)
EGFRCR SERPLBLD CKD-EPI 2021: 104 ML/MIN/1.73
EOSINOPHIL # BLD AUTO: 0.4 10*3/MM3 (ref 0–0.4)
EOSINOPHIL NFR BLD AUTO: 5.1 % (ref 0.3–6.2)
ERYTHROCYTE [DISTWIDTH] IN BLOOD BY AUTOMATED COUNT: 12 % (ref 12.3–15.4)
GLOBULIN UR ELPH-MCNC: 2.3 GM/DL
GLUCOSE SERPL-MCNC: 212 MG/DL (ref 65–99)
HCT VFR BLD AUTO: 38.5 % (ref 34–46.6)
HGB BLD-MCNC: 13 G/DL (ref 12–15.9)
IMM GRANULOCYTES # BLD AUTO: 0.06 10*3/MM3 (ref 0–0.05)
IMM GRANULOCYTES NFR BLD AUTO: 0.8 % (ref 0–0.5)
LYMPHOCYTES # BLD AUTO: 2.33 10*3/MM3 (ref 0.7–3.1)
LYMPHOCYTES NFR BLD AUTO: 29.9 % (ref 19.6–45.3)
MCH RBC QN AUTO: 25.8 PG (ref 26.6–33)
MCHC RBC AUTO-ENTMCNC: 33.8 G/DL (ref 31.5–35.7)
MCV RBC AUTO: 76.5 FL (ref 79–97)
MONOCYTES # BLD AUTO: 0.54 10*3/MM3 (ref 0.1–0.9)
MONOCYTES NFR BLD AUTO: 6.9 % (ref 5–12)
NEUTROPHILS NFR BLD AUTO: 4.37 10*3/MM3 (ref 1.7–7)
NEUTROPHILS NFR BLD AUTO: 56.3 % (ref 42.7–76)
PLATELET # BLD AUTO: 216 10*3/MM3 (ref 140–450)
PMV BLD AUTO: 9.7 FL (ref 6–12)
POTASSIUM SERPL-SCNC: 4 MMOL/L (ref 3.5–5.2)
PROT SERPL-MCNC: 5.9 G/DL (ref 6–8.5)
RBC # BLD AUTO: 5.03 10*6/MM3 (ref 3.77–5.28)
SODIUM SERPL-SCNC: 137 MMOL/L (ref 136–145)
WBC NRBC COR # BLD AUTO: 7.78 10*3/MM3 (ref 3.4–10.8)

## 2024-08-23 PROCEDURE — 96376 TX/PRO/DX INJ SAME DRUG ADON: CPT

## 2024-08-23 PROCEDURE — 85025 COMPLETE CBC W/AUTO DIFF WBC: CPT

## 2024-08-23 PROCEDURE — 25010000002 KETOROLAC TROMETHAMINE PER 15 MG

## 2024-08-23 PROCEDURE — 25810000003 SODIUM CHLORIDE 0.9 % SOLUTION

## 2024-08-23 PROCEDURE — 80053 COMPREHEN METABOLIC PANEL: CPT

## 2024-08-23 PROCEDURE — 25010000002 CEFTRIAXONE PER 250 MG

## 2024-08-23 PROCEDURE — 96366 THER/PROPH/DIAG IV INF ADDON: CPT

## 2024-08-23 PROCEDURE — G0378 HOSPITAL OBSERVATION PER HR: HCPCS

## 2024-08-23 PROCEDURE — 25010000002 FENTANYL CITRATE (PF) 50 MCG/ML SOLUTION: Performed by: EMERGENCY MEDICINE

## 2024-08-23 PROCEDURE — 25010000002 MORPHINE PER 10 MG: Performed by: EMERGENCY MEDICINE

## 2024-08-23 PROCEDURE — 96375 TX/PRO/DX INJ NEW DRUG ADDON: CPT

## 2024-08-23 RX ORDER — KETOROLAC TROMETHAMINE 30 MG/ML
30 INJECTION, SOLUTION INTRAMUSCULAR; INTRAVENOUS EVERY 6 HOURS PRN
Status: DISCONTINUED | OUTPATIENT
Start: 2024-08-23 | End: 2024-08-24

## 2024-08-23 RX ORDER — FENTANYL CITRATE 50 UG/ML
25 INJECTION, SOLUTION INTRAMUSCULAR; INTRAVENOUS ONCE
Status: COMPLETED | OUTPATIENT
Start: 2024-08-23 | End: 2024-08-23

## 2024-08-23 RX ORDER — FENTANYL CITRATE 50 UG/ML
25 INJECTION, SOLUTION INTRAMUSCULAR; INTRAVENOUS ONCE
Status: DISCONTINUED | OUTPATIENT
Start: 2024-08-23 | End: 2024-08-23

## 2024-08-23 RX ORDER — MORPHINE SULFATE 2 MG/ML
2 INJECTION, SOLUTION INTRAMUSCULAR; INTRAVENOUS ONCE
Status: COMPLETED | OUTPATIENT
Start: 2024-08-23 | End: 2024-08-23

## 2024-08-23 RX ORDER — HYDROCODONE BITARTRATE AND ACETAMINOPHEN 5; 325 MG/1; MG/1
1 TABLET ORAL ONCE
Status: COMPLETED | OUTPATIENT
Start: 2024-08-23 | End: 2024-08-23

## 2024-08-23 RX ORDER — HYDROCODONE BITARTRATE AND ACETAMINOPHEN 5; 325 MG/1; MG/1
1 TABLET ORAL ONCE
Status: DISCONTINUED | OUTPATIENT
Start: 2024-08-23 | End: 2024-08-23

## 2024-08-23 RX ADMIN — KETOROLAC TROMETHAMINE 30 MG: 30 INJECTION, SOLUTION INTRAMUSCULAR; INTRAVENOUS at 23:09

## 2024-08-23 RX ADMIN — KETOROLAC TROMETHAMINE 30 MG: 30 INJECTION, SOLUTION INTRAMUSCULAR; INTRAVENOUS at 03:13

## 2024-08-23 RX ADMIN — FENTANYL CITRATE 25 MCG: 0.05 INJECTION, SOLUTION INTRAMUSCULAR; INTRAVENOUS at 13:47

## 2024-08-23 RX ADMIN — SODIUM CHLORIDE 100 ML/HR: 9 INJECTION, SOLUTION INTRAVENOUS at 23:09

## 2024-08-23 RX ADMIN — KETOROLAC TROMETHAMINE 30 MG: 30 INJECTION, SOLUTION INTRAMUSCULAR; INTRAVENOUS at 08:41

## 2024-08-23 RX ADMIN — LOSARTAN POTASSIUM 25 MG: 50 TABLET, FILM COATED ORAL at 08:41

## 2024-08-23 RX ADMIN — ACETAMINOPHEN 650 MG: 325 TABLET ORAL at 18:28

## 2024-08-23 RX ADMIN — SODIUM CHLORIDE 100 ML/HR: 9 INJECTION, SOLUTION INTRAVENOUS at 08:01

## 2024-08-23 RX ADMIN — ATORVASTATIN CALCIUM 10 MG: 10 TABLET, FILM COATED ORAL at 08:41

## 2024-08-23 RX ADMIN — EMPAGLIFLOZIN 10 MG: 10 TABLET, FILM COATED ORAL at 08:41

## 2024-08-23 RX ADMIN — KETOROLAC TROMETHAMINE 30 MG: 30 INJECTION, SOLUTION INTRAMUSCULAR; INTRAVENOUS at 15:54

## 2024-08-23 RX ADMIN — SODIUM CHLORIDE 2000 MG: 900 INJECTION INTRAVENOUS at 15:42

## 2024-08-23 RX ADMIN — MORPHINE SULFATE 2 MG: 2 INJECTION, SOLUTION INTRAMUSCULAR; INTRAVENOUS at 04:51

## 2024-08-23 RX ADMIN — HYDROCODONE BITARTRATE AND ACETAMINOPHEN 1 TABLET: 5; 325 TABLET ORAL at 10:50

## 2024-08-23 NOTE — PLAN OF CARE
Goal Outcome Evaluation:      Patient is on Room Air, Aox4, stand by assist, received today from [place], she does have a 1cm kidney stone but its currently non-obstructing, she is diabetic, on a regular diet, on IVF's 100mL/HR of NS, continued IV Rocephin, VSS - will continue POC.

## 2024-08-23 NOTE — PROGRESS NOTES
[facility]     HISTORY AND PHYSICAL    Patient Name: Silva Maldonado  : 1960  MRN: [id number]  Primary Care Physician:  Sharmila Oconnell APRN  Date of admission: 2024    Subjective   Subjective     Chief Complaint: Right flank pain    History of Present Illness  Patient is a 63 y.o. female who presented to the ED yesterday with complaints of right lower quadrant/suprapubic abdominal pain and flank pain starting 2 to 3 weeks ago with acute worsening of the last couple days.  Patient was seen by PCP 3 days ago where CT scan demonstrated a nonobstructing 1 cm stone, patient was to follow-up with urology regarding this.  Patient starting taking miralax to help with discomfort however this has been unsuccessful despite it causing regular bowel movements.  Patient states that 2 days ago however pain increased and was accompanied by dysuria.  Patient was diagnosed with pyelonephritis at this time and given dose of IV Rocephin and started on cefdinir.  CTA of abdomen pelvis did not demonstrate any acute abnormalities.  Patient returning with similar symptoms at the time of admission and states that she is only been able to take 1 dose of cefdinir previously prescribed.  Patient restarted Rocephin in ED, 2 g administered.  It was deemed by ED provider that patient be admitted to CDU for continued monitoring, pain management, and IV antibiotics.    Review of Systems   Constitutional:  Negative for chills, fatigue and fever.   Eyes:  Negative for visual disturbance.   Respiratory:  Negative for shortness of breath.    Cardiovascular:  Negative for chest pain.   Gastrointestinal:  Negative for diarrhea, nausea and vomiting.   Genitourinary:  Positive for dysuria and flank pain.   Skin:  Negative for color change and rash.   Neurological:  Negative for speech difficulty, weakness and headaches.       Objective   Objective     Vitals:   Temp:  [97.5 °F (36.4 °C)-98.4 °F (36.9 °C)] 97.8 °F (36.6  °C)  Heart Rate:  [71-81] 81  Resp:  [13-18] 14  BP: (133-180)/() 133/103    Physical Exam  Constitutional:       General: She is not in acute distress.     Appearance: Normal appearance. She is not toxic-appearing.   HENT:      Head: Normocephalic and atraumatic.      Mouth/Throat:      Mouth: Mucous membranes are moist.      Pharynx: Oropharynx is clear.   Cardiovascular:      Rate and Rhythm: Normal rate and regular rhythm.   Pulmonary:      Effort: Pulmonary effort is normal.      Breath sounds: Normal breath sounds.   Abdominal:      General: Abdomen is flat.      Palpations: Abdomen is soft.      Tenderness: There is abdominal tenderness. There is right CVA tenderness. There is no guarding.   Musculoskeletal:      Right lower leg: No edema.      Left lower leg: No edema.   Skin:     General: Skin is warm and dry.      Capillary Refill: Capillary refill takes less than 2 seconds.   Neurological:      Mental Status: She is alert and oriented to person, place, and time.   Psychiatric:         Mood and Affect: Mood normal.         Behavior: Behavior normal.          Result Review    Result Review:  I have personally reviewed the results from the time of this admission to 8/23/2024 16:15 EDT and agree with these findings:  x laboratory list / accordion  x microbiology  x radiology  EKG/Telemetry   Cardiology/Vascular   Pathology  x old records  Other:  Most notable findings include: Too numerous to count white blood cells in urinalysis.  Negative acute CT findings, no leukocytosis, BUN and creatinine within normal limits.      Assessment & Plan   Assessment / Plan     Brief Patient Summary:    Silva Maldonado is a 63 y.o. female who presented to the ED yesterday with complaints of flank pain and dysuria.  Symptoms started 2 to 3 weeks ago but have gotten progressively worse over the last couple days.  Patient was seen by PCP 3 days ago who did CT scan found nonobstructing 1 cm left sided kidney stone  but no other acute findings.  Patient was seen in the ED next day she was diagnosed with pyelonephritis and given a course of IV Rocephin and started on cefdinir.  Patient was unable to  her prescription until recently is only taken 1 dose of this is presenting back to the ED yesterday for worsening symptoms.  Patient denying any fevers, chills but started feel nauseous from discomfort.  CTA abdomen pelvis not demonstrate acute findings at this time.  No leukocytosis or normal findings on CMP noted.  Urinalysis has however worsened which prompted evaluation and management in CDU.  Patient's repeat blood work stable with very minimal changes but mildly improved creatinine.  Creatinine and BUN within normal limits.  Vital signs stable, patient afebrile with normal lactic acid.  Patient's pain became very difficult to control.  Patient was trialed on Toradol, morphine, and Norco with only very minor reduction in symptom burden.  Patient still reporting some nights and pain and is very uncomfortable in bed.  Pain is not caused by movement but is exacerbated by it.  Treatment options discussed with patient after extensive discussion it was collectively agreed to admit patient for further IV antibiotics, continued monitoring, and pain management.    Active Hospital Problems:  Active Hospital Problems    Diagnosis     **Pyelonephritis      Plan:     Disposition:  Transfer to [facility]     Chrissy Simmons PA-C

## 2024-08-23 NOTE — FSED PROVIDER NOTE
[place]    EMERGENCY DEPARTMENT ENCOUNTER      Pt Name: Silva Maldonado  MRN: [id number]  YOB: 1960  Date of evaluation: 8/22/2024  Provider: Leonila Cody PA-C    CHIEF COMPLAINT       Chief Complaint   Patient presents with    Flank Pain     HISTORY OF PRESENT ILLNESS  (Location/Symptom, Timing/Onset, Context/Setting, Quality, Duration, Modifying Factors, Severity.)   Silva Maldonado is a 63 y.o. female who presents to the emergency department with right flank pain, dysuria, and nausea started approximately 2 weeks ago.  She states she has had similar symptoms this past April, she was seen at [facility] at that time.  Patient was seen at our facility yesterday and diagnosed with acute pyelonephritis.  She states she was only able to take 1 dose of her medication.  She returns tonight stating that the pain has not improved.    Nursing notes were reviewed.    REVIEW OF SYSTEMS    (2-9 systems for level 4, 10 or more for level 5)   Review of Systems   Constitutional:  Negative for chills and fever.   HENT:  Negative for ear pain and sore throat.    Respiratory:  Negative for cough and shortness of breath.    Cardiovascular:  Negative for chest pain.   Gastrointestinal:  Positive for nausea. Negative for diarrhea and vomiting.   Genitourinary:  Positive for dysuria and flank pain. Negative for frequency.   Musculoskeletal:  Negative for back pain and neck pain.   Neurological:  Negative for headaches.      All systems reviewed and negative except for those discussed in HPI.   PAST MEDICAL HISTORY     Past Medical History:   Diagnosis Date    Cholelithiasis     Removed in 1996    Colon polyp 12/2019    Recheck i 3 years    Diabetes mellitus 2017    type II    Diverticulosis 2007    Elevated cholesterol     Gestational hypertension 9/1999    Hypertension     Multiple gestation 1999    1 still born, 2 live burths    Obesity     Ovarian cyst 1999    Pelvic prolapse 2001     Preeclampsia 1999    Varicella 1968     SURGICAL HISTORY       Past Surgical History:   Procedure Laterality Date    CHOLECYSTECTOMY  1998    COLONOSCOPY  12/27/2019    COLONOSCOPY N/A 05/04/2023    Procedure: COLONOSCOPY with polypectomy x 5;  Surgeon: Breanne Smith MD;  [facility] ENDOSCOPY;  Service: Gastroenterology;  Laterality: N/A;    KNEE ARTHROSCOPY Left     LAPAROSCOPIC CHOLECYSTECTOMY  1998    TONSILLECTOMY  1983    TUBAL ABDOMINAL LIGATION  2006    VAGINAL PROLAPSE REPAIR  2006     CURRENT MEDICATIONS       Current Facility-Administered Medications:     cefTRIAXone (ROCEPHIN) 2,000 mg in sodium chloride 0.9 % 100 mL MBP, 2,000 mg, Intravenous, Once, Leonila Cody PA-C    Sodium Chloride (PF) 0.9 % 10 mL, 10 mL, Intravenous, PRN, Oziel Hurst,     sodium chloride 0.9 % bolus 1,000 mL, 1,000 mL, Intravenous, Once, Leonila Cody PA-C    Current Outpatient Medications:     Blood Glucose Monitoring Suppl kit, 1 each 4 (Four) Times a Day., Disp: 1 each, Rfl: 0    cefdinir (OMNICEF) 300 MG capsule, Take 1 capsule by mouth 2 (Two) Times a Day for 7 days., Disp: 14 capsule, Rfl: 0    fenofibrate (Tricor) 145 MG tablet, Take 1 tablet by mouth Daily., Disp: 30 tablet, Rfl: 5    glucose blood (Glucose Meter Test) test strip, Use as instructed, Disp: 200 each, Rfl: 12    Jardiance 10 MG tablet tablet, TAKE 1 TABLET BY MOUTH DAILY, Disp: 30 tablet, Rfl: 2    losartan (COZAAR) 25 MG tablet, Take 1 tablet by mouth Daily., Disp: 30 tablet, Rfl: 2    Magnesium 250 MG tablet, Take 1 tablet by mouth At Night As Needed., Disp: , Rfl:     oxyCODONE-acetaminophen (PERCOCET) 5-325 MG per tablet, Take 1 tablet by mouth Every 6 (Six) Hours As Needed for Moderate Pain or Severe Pain for up to 12 doses., Disp: 12 tablet, Rfl: 0    phenazopyridine (Pyridium) 200 MG tablet, Take 1 tablet by mouth 3 (Three) Times a Day As Needed for Bladder Spasms., Disp: 6 tablet, Rfl: 0    Semaglutide, 1 MG/DOSE,  (Ozempic, 1 MG/DOSE,) 4 MG/3ML solution pen-injector, Inject 1 mg under the skin into the appropriate area as directed 1 (One) Time Per Week., Disp: 3 mL, Rfl: 2    simvastatin (ZOCOR) 20 MG tablet, Take 1 tablet by mouth Every Evening., Disp: 30 tablet, Rfl: 3    ALLERGIES     Patient has no known allergies.    FAMILY HISTORY       Family History   Problem Relation Age of Onset    Cervical cancer Mother     Hyperlipidemia Mother     Stroke Mother     Hypertension Mother     Cancer Father         Brain tumor - unsure if primary or not    Hyperlipidemia Father     Arthritis Brother     Lung cancer Brother     Heart disease Brother     Hyperlipidemia Brother     Hypertension Brother     Diabetes Brother     Hyperlipidemia Brother     Kidney disease Brother     Hypertension Brother     Heart disease Brother     Hyperlipidemia Brother     Hypertension Brother     Hyperlipidemia Brother     Celiac disease Daughter     Fibromyalgia Daughter     Other Daughter         Celiac, fibromialgia    Sjogren's syndrome Daughter     Other Daughter         Sjorgen disease, mixed connective disorder, hyper mobility    Colon cancer Neg Hx     Breast cancer Neg Hx      SOCIAL HISTORY       Social History     Socioeconomic History    Marital status:    Tobacco Use    Smoking status: Former     Current packs/day: 0.00     Average packs/day: 1.5 packs/day for 25.6 years (38.4 ttl pk-yrs)     Types: Cigarettes     Start date: 1986     Quit date: 2012     Years since quittin.6     Passive exposure: Never    Smokeless tobacco: Never   Vaping Use    Vaping status: Never Used   Substance and Sexual Activity    Alcohol use: Never    Drug use: Never    Sexual activity: Yes     Partners: Male     Birth control/protection: Post-menopausal, Tubal ligation     PHYSICAL EXAM    (up to 7 for level 4, 8 or more for level 5)   Physical Exam  Vitals and nursing note reviewed. Exam conducted with a chaperone present.   Constitutional:        Appearance: She is obese.   HENT:      Head: Normocephalic and atraumatic.   Eyes:      Extraocular Movements: Extraocular movements intact.      Pupils: Pupils are equal, round, and reactive to light.   Cardiovascular:      Rate and Rhythm: Normal rate and regular rhythm.      Pulses: Normal pulses.   Pulmonary:      Effort: Pulmonary effort is normal.      Breath sounds: Normal breath sounds.   Abdominal:      General: Abdomen is flat. Bowel sounds are normal.      Palpations: Abdomen is soft.      Tenderness: There is right CVA tenderness.   Musculoskeletal:         General: Normal range of motion.      Cervical back: Normal range of motion.   Skin:     General: Skin is warm and dry.   Neurological:      General: No focal deficit present.      Mental Status: She is alert and oriented to person, place, and time.   Psychiatric:         Mood and Affect: Mood normal.         Behavior: Behavior normal.       DIAGNOSTIC RESULTS     EKG: All EKGs are interpreted by the Emergency Department Physician who either signs or Co-signs this chart in the absence of a cardiologist.    No orders to display     RADIOLOGY:   Non-plain film images such as CT, Ultrasound and MRI are read by the radiologist. Plain radiographic images are visualized and preliminarily interpreted by the emergency physician with the below findings:    [] Radiologist's Report Reviewed:  No orders to display     ED BEDSIDE ULTRASOUND:   Performed by ED Physician - none    LABS:    I have reviewed and interpreted all of the currently available lab results from this visit (if applicable):  Results for orders placed or performed during the hospital encounter of 08/22/24   Comprehensive Metabolic Panel    Specimen: Blood   Result Value Ref Range    Glucose 297 (H) 65 - 99 mg/dL    BUN 15 8 - 23 mg/dL    Creatinine 0.73 0.57 - 1.00 mg/dL    Sodium 133 (L) 136 - 145 mmol/L    Potassium 4.2 3.5 - 5.2 mmol/L    Chloride 102 98 - 107 mmol/L    CO2 21.7 (L) 22.0  - 29.0 mmol/L    Calcium 8.4 (L) 8.6 - 10.5 mg/dL    Total Protein 6.0 6.0 - 8.5 g/dL    Albumin 3.6 3.5 - 5.2 g/dL    ALT (SGPT) 30 1 - 33 U/L    AST (SGOT) 24 1 - 32 U/L    Alkaline Phosphatase 98 39 - 117 U/L    Total Bilirubin 0.3 0.0 - 1.2 mg/dL    Globulin 2.4 gm/dL    A/G Ratio 1.5 g/dL    BUN/Creatinine Ratio 20.5 7.0 - 25.0    Anion Gap 9.3 5.0 - 15.0 mmol/L    eGFR 93.7 >60.0 mL/min/1.73   Lipase    Specimen: Blood   Result Value Ref Range    Lipase 31 13 - 60 U/L   Urinalysis With Microscopic If Indicated (No Culture) - Urine, Clean Catch    Specimen: Urine, Clean Catch   Result Value Ref Range    Color, UA Yellow Yellow, Straw    Appearance, UA Clear Clear    pH, UA 6.0 5.0 - 8.0    Specific Gravity, UA 1.015 1.005 - 1.030    Glucose, UA >=1000 mg/dL (3+) (A) Negative    Ketones, UA Negative Negative    Bilirubin, UA Negative Negative    Blood, UA Trace (A) Negative    Protein, UA Negative Negative    Leuk Esterase, UA Negative Negative    Nitrite, UA Negative Negative    Urobilinogen, UA 0.2 E.U./dL 0.2 - 1.0 E.U./dL   Lactic Acid, Plasma    Specimen: Blood   Result Value Ref Range    Lactate 1.9 0.5 - 2.0 mmol/L   CBC Auto Differential    Specimen: Blood   Result Value Ref Range    WBC 8.02 3.40 - 10.80 10*3/mm3    RBC 5.03 3.77 - 5.28 10*6/mm3    Hemoglobin 13.0 12.0 - 15.9 g/dL    Hematocrit 38.8 34.0 - 46.6 %    MCV 77.1 (L) 79.0 - 97.0 fL    MCH 25.8 (L) 26.6 - 33.0 pg    MCHC 33.5 31.5 - 35.7 g/dL    RDW 11.9 (L) 12.3 - 15.4 %    RDW-SD 34.3 (L) 37.0 - 54.0 fl    MPV 9.9 6.0 - 12.0 fL    Platelets 238 140 - 450 10*3/mm3    Neutrophil % 57.5 42.7 - 76.0 %    Lymphocyte % 28.1 19.6 - 45.3 %    Monocyte % 7.5 5.0 - 12.0 %    Eosinophil % 5.0 0.3 - 6.2 %    Basophil % 0.9 0.0 - 1.5 %    Immature Grans % 1.0 (H) 0.0 - 0.5 %    Neutrophils, Absolute 4.62 1.70 - 7.00 10*3/mm3    Lymphocytes, Absolute 2.25 0.70 - 3.10 10*3/mm3    Monocytes, Absolute 0.60 0.10 - 0.90 10*3/mm3    Eosinophils, Absolute 0.40  0.00 - 0.40 10*3/mm3    Basophils, Absolute 0.07 0.00 - 0.20 10*3/mm3    Immature Grans, Absolute 0.08 (H) 0.00 - 0.05 10*3/mm3   Urinalysis, Microscopic Only - Urine, Clean Catch    Specimen: Urine, Clean Catch   Result Value Ref Range    RBC, UA 6-10 (A) None Seen, 0-2 /HPF    WBC, UA Too Numerous to Count (A) None Seen, 0-2 /HPF    Bacteria, UA 2+ (A) None Seen /HPF    Squamous Epithelial Cells, UA 7-12 (A) None Seen, 0-2 /HPF    Hyaline Casts, UA 0-2 None Seen /LPF    Methodology Manual Light Microscopy    Green Top (Gel)   Result Value Ref Range    Extra Tube Hold for add-ons.    Lavender Top   Result Value Ref Range    Extra Tube hold for add-on    Gold Top - SST   Result Value Ref Range    Extra Tube Hold for add-ons.    Gray Top   Result Value Ref Range    Extra Tube Hold for add-ons.    Light Blue Top   Result Value Ref Range    Extra Tube Hold for add-ons.         All other labs were within normal range or not returned as of this dictation.      EMERGENCY DEPARTMENT COURSE and DIFFERENTIAL DIAGNOSIS/MDM:   Vitals:    Vitals:    08/22/24 2100 08/22/24 2119 08/22/24 2130 08/22/24 2200   BP: 168/96  169/86 172/68   BP Location:       Patient Position:       Pulse:  80 72 71   Resp:       Temp:       TempSrc:       SpO2:  97% 96% 97%   Weight:       Height:            MDM  Number of Diagnoses or Management Options  Pyelonephritis: established, worsening     Amount and/or Complexity of Data Reviewed  Clinical lab tests: ordered  Review and summarize past medical records: yes  Discuss the patient with other providers: yes (I spoke with Kumar Woods PA-C he accepted the patient for admission to the CDU.)       Patient was evaluated, labs and imaging were obtained.  Patient will be admitted to our clinical decision unit for further treatment.  Patient stable at the time of admission.    MEDICATIONS ADMINISTERED IN ED:  Medications   Sodium Chloride (PF) 0.9 % 10 mL (has no administration in time range)   sodium  chloride 0.9 % bolus 1,000 mL (has no administration in time range)   cefTRIAXone (ROCEPHIN) 2,000 mg in sodium chloride 0.9 % 100 mL MBP (has no administration in time range)   morphine injection 2 mg (2 mg Intravenous Given 8/22/24 2230)     PROCEDURES:  Procedures:none      CRITICAL CARE TIME    Total Critical Care time was 0 minutes, excluding separately reportable procedures.   There was a high probability of clinically significant/life threatening deterioration in the patient's condition which required my urgent intervention.    FINAL IMPRESSION      1. Pyelonephritis          DISPOSITION/PLAN     ED Disposition       ED Disposition   Send to Specialty Department    Condition   --    Comment   --               PATIENT REFERRED TO:  No follow-up provider specified.    DISCHARGE MEDICATIONS:     Medication List        CONTINUE taking these medications      Blood Glucose Monitoring Suppl kit  1 each 4 (Four) Times a Day.     cefdinir 300 MG capsule  Commonly known as: OMNICEF  Take 1 capsule by mouth 2 (Two) Times a Day for 7 days.     fenofibrate 145 MG tablet  Commonly known as: Tricor  Take 1 tablet by mouth Daily.     glucose blood test strip  Commonly known as: Glucose Meter Test  Use as instructed     Jardiance 10 MG tablet tablet  Generic drug: empagliflozin  TAKE 1 TABLET BY MOUTH DAILY     losartan 25 MG tablet  Commonly known as: COZAAR  Take 1 tablet by mouth Daily.     Magnesium 250 MG tablet     oxyCODONE-acetaminophen 5-325 MG per tablet  Commonly known as: PERCOCET  Take 1 tablet by mouth Every 6 (Six) Hours As Needed for Moderate Pain or Severe Pain for up to 12 doses.     Ozempic (1 MG/DOSE) 4 MG/3ML solution pen-injector  Generic drug: Semaglutide (1 MG/DOSE)  Inject 1 mg under the skin into the appropriate area as directed 1 (One) Time Per Week.     phenazopyridine 200 MG tablet  Commonly known as: Pyridium  Take 1 tablet by mouth 3 (Three) Times a Day As Needed for Bladder Spasms.      simvastatin 20 MG tablet  Commonly known as: ZOCOR  Take 1 tablet by mouth Every Evening.                  Comment: Please note this report has been produced using speech recognition software.      Leonila Cody PA-C

## 2024-08-23 NOTE — H&P
[facility]     HISTORY AND PHYSICAL    Patient Name: Silva Maldonado  : 1960  MRN: [id number]  Primary Care Physician:  Sharmila Oconnell APRN  Date of admission: 2024    Subjective   Subjective     Chief Complaint: abdominal/flank pain    Flank Pain  Associated symptoms include abdominal pain and dysuria.     Patient is a 63 y.o. female who presented to the ED yesterday with complaints of RLQ abdominal pain and right flank pain over the past few weeks.  Patient was seen 2 days ago in the ED for the same symptoms.  Was diagnosed with pyelonephritis.  Was given Rocephin in the ED and started on cefdinir.  Only taken 1 dose of cefdinir.  Patient stated that her symptoms have not improved and return to ED.  Patient's UA showed worsening of pyelonephritis with increased WBC.  Was given 2 g Rocephin in the ED.  It was also noted the patient had elevated glucose of 297 and sodium of 133 and was given liter of IV fluids.  It was deemed by provider in the ED the patient be admitted to the CDU for continued IV fluids and further antibiotic treatment.     Review of Systems   Gastrointestinal:  Positive for abdominal pain.   Genitourinary:  Positive for dysuria and flank pain.   All other systems reviewed and are negative.    Objective   Objective     Vitals:   Temp:  [97.5 °F (36.4 °C)-97.9 °F (36.6 °C)] 97.7 °F (36.5 °C)  Heart Rate:  [71-81] 74  Resp:  [16-18] 16  BP: (158-180)/() 158/92    Physical Exam  Constitutional:       General: She is not in acute distress.     Appearance: She is obese. She is not ill-appearing.   HENT:      Head: Normocephalic and atraumatic.   Cardiovascular:      Rate and Rhythm: Normal rate and regular rhythm.      Pulses: Normal pulses.   Pulmonary:      Effort: Pulmonary effort is normal. No respiratory distress.      Breath sounds: Normal breath sounds.   Abdominal:      General: Abdomen is flat.      Palpations: Abdomen is soft.      Tenderness: There is  abdominal tenderness (mild in RLQ and suprapubic region). There is right CVA tenderness.   Musculoskeletal:         General: No swelling or tenderness. Normal range of motion.      Cervical back: Normal range of motion and neck supple.   Skin:     General: Skin is warm and dry.   Neurological:      General: No focal deficit present.      Mental Status: She is alert and oriented to person, place, and time.   Psychiatric:         Mood and Affect: Mood normal.         Behavior: Behavior normal.        Result Review    Result Review:  I have personally reviewed the results from the time of this admission to 8/23/2024 03:31 EDT and agree with these findings:  [x]  Laboratory list / accordion  [x]  Microbiology  [x]  Radiology  [x]  EKG/Telemetry   []  Cardiology/Vascular   []  Pathology  [x]  Old records  []  Other:  Most notable findings include:   UA: RBC 6-10, WBC TNTC, bacteria 2+, trace blood  CMP: Glucose 297, sodium 133    Assessment & Plan   Assessment / Plan     Brief Patient Summary:  Silva Maldonado is a 63 y.o. female with PMHx of T2DM who presented to the ED yesterday with complaints of RLQ/suprapubic abdominal pain and flank pain that started a few weeks ago.  Was seen 2 days ago here in the ED and was diagnosed with pyelonephritis given IV Rocephin, and started on cefdinir.  Patient returned with similar symptoms.  UA showed worsening of pyelonephritis as UA showed WBC TNTC.  Was given 2 g Rocephin in the ED.  It was noted the patient had elevated glucose of 297.  Was given IV fluids in ED.  Admitted to the CDU for further evaluation and antibiotic treatment.  Patient was started on maintenance fluids at 100 mL/hr.  Patient called out due to pain and was given 30 mg of Toradol and 2 mg of morphine.  Will receive next dose of Rocephin at 4 PM and was agreeable to staying until then.  I expect patient to be discharged after receiving IV ABX and will continue oral cefdinir at home.    Active Hospital  Problems:  Active Hospital Problems    Diagnosis     **Pyelonephritis      Plan:     Pyelonephritis:  #UA: RBC 6-10, WBC TNTC, bacteria 2+, trace blood  #IV Rocephin in ED  #Second dose at 4 PM  #Continue oral ABX discharge    T2DM:  #Glucose: 297  #A1c: 10.3  #IV fluids at 100 mL/hr  #Continue Jardiance and Ozempic    HTN:  #Continue losartan    HLD:  #Continue with simvastatin    VTE prophylaxis:  #Not clinically indicated for this patient as she ambulates    Disposition:  I expect patient to be discharged 08/23/2024, after receiving second dose of IV ABX and pending clinical assessment.    Owen Woods PA-C

## 2024-08-24 ENCOUNTER — APPOINTMENT (OUTPATIENT)
Dept: ULTRASOUND IMAGING | Facility: HOSPITAL | Age: 64
End: 2024-08-24
Payer: COMMERCIAL

## 2024-08-24 LAB
ALBUMIN SERPL-MCNC: 4 G/DL (ref 3.5–5.2)
ALBUMIN/GLOB SERPL: 1.8 G/DL
ALP SERPL-CCNC: 95 U/L (ref 39–117)
ALT SERPL W P-5'-P-CCNC: 35 U/L (ref 1–33)
ANION GAP SERPL CALCULATED.3IONS-SCNC: 11 MMOL/L (ref 5–15)
AST SERPL-CCNC: 25 U/L (ref 1–32)
BACTERIA SPEC AEROBE CULT: NO GROWTH
BASOPHILS # BLD AUTO: 0.1 10*3/MM3 (ref 0–0.2)
BASOPHILS NFR BLD AUTO: 1.2 % (ref 0–1.5)
BILIRUB SERPL-MCNC: 0.4 MG/DL (ref 0–1.2)
BUN SERPL-MCNC: 13 MG/DL (ref 8–23)
BUN/CREAT SERPL: 18.3 (ref 7–25)
CALCIUM SPEC-SCNC: 8.8 MG/DL (ref 8.6–10.5)
CHLORIDE SERPL-SCNC: 106 MMOL/L (ref 98–107)
CO2 SERPL-SCNC: 20 MMOL/L (ref 22–29)
CREAT SERPL-MCNC: 0.71 MG/DL (ref 0.57–1)
DEPRECATED RDW RBC AUTO: 34.2 FL (ref 37–54)
EGFRCR SERPLBLD CKD-EPI 2021: 96.9 ML/MIN/1.73
EOSINOPHIL # BLD AUTO: 0.39 10*3/MM3 (ref 0–0.4)
EOSINOPHIL NFR BLD AUTO: 4.6 % (ref 0.3–6.2)
ERYTHROCYTE [DISTWIDTH] IN BLOOD BY AUTOMATED COUNT: 12.2 % (ref 12.3–15.4)
GLOBULIN UR ELPH-MCNC: 2.2 GM/DL
GLUCOSE SERPL-MCNC: 181 MG/DL (ref 65–99)
HCT VFR BLD AUTO: 41.2 % (ref 34–46.6)
HGB BLD-MCNC: 14.1 G/DL (ref 12–15.9)
IMM GRANULOCYTES # BLD AUTO: 0.09 10*3/MM3 (ref 0–0.05)
IMM GRANULOCYTES NFR BLD AUTO: 1.1 % (ref 0–0.5)
LYMPHOCYTES # BLD AUTO: 2.13 10*3/MM3 (ref 0.7–3.1)
LYMPHOCYTES NFR BLD AUTO: 25 % (ref 19.6–45.3)
MCH RBC QN AUTO: 26.8 PG (ref 26.6–33)
MCHC RBC AUTO-ENTMCNC: 34.2 G/DL (ref 31.5–35.7)
MCV RBC AUTO: 78.2 FL (ref 79–97)
MONOCYTES # BLD AUTO: 0.54 10*3/MM3 (ref 0.1–0.9)
MONOCYTES NFR BLD AUTO: 6.3 % (ref 5–12)
NEUTROPHILS NFR BLD AUTO: 5.28 10*3/MM3 (ref 1.7–7)
NEUTROPHILS NFR BLD AUTO: 61.8 % (ref 42.7–76)
NRBC BLD AUTO-RTO: 0 /100 WBC (ref 0–0.2)
PLATELET # BLD AUTO: 235 10*3/MM3 (ref 140–450)
PMV BLD AUTO: 10 FL (ref 6–12)
POTASSIUM SERPL-SCNC: 4.3 MMOL/L (ref 3.5–5.2)
PROT SERPL-MCNC: 6.2 G/DL (ref 6–8.5)
RBC # BLD AUTO: 5.27 10*6/MM3 (ref 3.77–5.28)
SODIUM SERPL-SCNC: 137 MMOL/L (ref 136–145)
WBC NRBC COR # BLD AUTO: 8.53 10*3/MM3 (ref 3.4–10.8)

## 2024-08-24 PROCEDURE — 99222 1ST HOSP IP/OBS MODERATE 55: CPT | Performed by: STUDENT IN AN ORGANIZED HEALTH CARE EDUCATION/TRAINING PROGRAM

## 2024-08-24 PROCEDURE — 80053 COMPREHEN METABOLIC PANEL: CPT | Performed by: STUDENT IN AN ORGANIZED HEALTH CARE EDUCATION/TRAINING PROGRAM

## 2024-08-24 PROCEDURE — 25010000002 KETOROLAC TROMETHAMINE PER 15 MG

## 2024-08-24 PROCEDURE — 96376 TX/PRO/DX INJ SAME DRUG ADON: CPT

## 2024-08-24 PROCEDURE — 76775 US EXAM ABDO BACK WALL LIM: CPT

## 2024-08-24 PROCEDURE — 85025 COMPLETE CBC W/AUTO DIFF WBC: CPT | Performed by: STUDENT IN AN ORGANIZED HEALTH CARE EDUCATION/TRAINING PROGRAM

## 2024-08-24 PROCEDURE — 25010000002 HYDROMORPHONE PER 4 MG: Performed by: STUDENT IN AN ORGANIZED HEALTH CARE EDUCATION/TRAINING PROGRAM

## 2024-08-24 PROCEDURE — G0378 HOSPITAL OBSERVATION PER HR: HCPCS

## 2024-08-24 PROCEDURE — 25810000003 SODIUM CHLORIDE 0.9 % SOLUTION

## 2024-08-24 PROCEDURE — 25010000002 CEFTRIAXONE PER 250 MG: Performed by: STUDENT IN AN ORGANIZED HEALTH CARE EDUCATION/TRAINING PROGRAM

## 2024-08-24 PROCEDURE — 96375 TX/PRO/DX INJ NEW DRUG ADDON: CPT

## 2024-08-24 PROCEDURE — 25810000003 LACTATED RINGERS PER 1000 ML: Performed by: STUDENT IN AN ORGANIZED HEALTH CARE EDUCATION/TRAINING PROGRAM

## 2024-08-24 RX ORDER — SODIUM CHLORIDE, SODIUM LACTATE, POTASSIUM CHLORIDE, CALCIUM CHLORIDE 600; 310; 30; 20 MG/100ML; MG/100ML; MG/100ML; MG/100ML
75 INJECTION, SOLUTION INTRAVENOUS CONTINUOUS
Status: ACTIVE | OUTPATIENT
Start: 2024-08-24 | End: 2024-08-25

## 2024-08-24 RX ORDER — NITROGLYCERIN 0.4 MG/1
0.4 TABLET SUBLINGUAL
Status: DISCONTINUED | OUTPATIENT
Start: 2024-08-24 | End: 2024-08-25 | Stop reason: HOSPADM

## 2024-08-24 RX ORDER — SODIUM CHLORIDE 0.9 % (FLUSH) 0.9 %
10 SYRINGE (ML) INJECTION AS NEEDED
Status: DISCONTINUED | OUTPATIENT
Start: 2024-08-24 | End: 2024-08-25 | Stop reason: HOSPADM

## 2024-08-24 RX ORDER — AMOXICILLIN 250 MG
2 CAPSULE ORAL 2 TIMES DAILY PRN
Status: DISCONTINUED | OUTPATIENT
Start: 2024-08-24 | End: 2024-08-25 | Stop reason: HOSPADM

## 2024-08-24 RX ORDER — ONDANSETRON 2 MG/ML
4 INJECTION INTRAMUSCULAR; INTRAVENOUS EVERY 6 HOURS PRN
Status: DISCONTINUED | OUTPATIENT
Start: 2024-08-24 | End: 2024-08-25 | Stop reason: HOSPADM

## 2024-08-24 RX ORDER — ACETAMINOPHEN 325 MG/1
650 TABLET ORAL EVERY 4 HOURS PRN
Status: DISCONTINUED | OUTPATIENT
Start: 2024-08-24 | End: 2024-08-24

## 2024-08-24 RX ORDER — SODIUM CHLORIDE 0.9 % (FLUSH) 0.9 %
10 SYRINGE (ML) INJECTION EVERY 12 HOURS SCHEDULED
Status: DISCONTINUED | OUTPATIENT
Start: 2024-08-24 | End: 2024-08-25 | Stop reason: HOSPADM

## 2024-08-24 RX ORDER — BISACODYL 10 MG
10 SUPPOSITORY, RECTAL RECTAL DAILY PRN
Status: DISCONTINUED | OUTPATIENT
Start: 2024-08-24 | End: 2024-08-25 | Stop reason: HOSPADM

## 2024-08-24 RX ORDER — ACETAMINOPHEN 500 MG
1000 TABLET ORAL 3 TIMES DAILY
Status: DISCONTINUED | OUTPATIENT
Start: 2024-08-24 | End: 2024-08-25 | Stop reason: HOSPADM

## 2024-08-24 RX ORDER — SODIUM CHLORIDE 9 MG/ML
40 INJECTION, SOLUTION INTRAVENOUS AS NEEDED
Status: DISCONTINUED | OUTPATIENT
Start: 2024-08-24 | End: 2024-08-25 | Stop reason: HOSPADM

## 2024-08-24 RX ORDER — POLYETHYLENE GLYCOL 3350 17 G/17G
17 POWDER, FOR SOLUTION ORAL DAILY PRN
Status: DISCONTINUED | OUTPATIENT
Start: 2024-08-24 | End: 2024-08-25 | Stop reason: HOSPADM

## 2024-08-24 RX ORDER — HYDROMORPHONE HYDROCHLORIDE 1 MG/ML
0.5 INJECTION, SOLUTION INTRAMUSCULAR; INTRAVENOUS; SUBCUTANEOUS
Status: DISCONTINUED | OUTPATIENT
Start: 2024-08-24 | End: 2024-08-25 | Stop reason: HOSPADM

## 2024-08-24 RX ORDER — ACETAMINOPHEN 650 MG/1
650 SUPPOSITORY RECTAL EVERY 4 HOURS PRN
Status: DISCONTINUED | OUTPATIENT
Start: 2024-08-24 | End: 2024-08-24

## 2024-08-24 RX ORDER — ACETAMINOPHEN 160 MG/5ML
650 SOLUTION ORAL EVERY 4 HOURS PRN
Status: DISCONTINUED | OUTPATIENT
Start: 2024-08-24 | End: 2024-08-24

## 2024-08-24 RX ORDER — BISACODYL 5 MG/1
5 TABLET, DELAYED RELEASE ORAL DAILY PRN
Status: DISCONTINUED | OUTPATIENT
Start: 2024-08-24 | End: 2024-08-25 | Stop reason: HOSPADM

## 2024-08-24 RX ADMIN — ACETAMINOPHEN 1000 MG: 500 TABLET ORAL at 14:35

## 2024-08-24 RX ADMIN — HYDROMORPHONE HYDROCHLORIDE 0.5 MG: 1 INJECTION, SOLUTION INTRAMUSCULAR; INTRAVENOUS; SUBCUTANEOUS at 15:56

## 2024-08-24 RX ADMIN — LOSARTAN POTASSIUM 25 MG: 50 TABLET, FILM COATED ORAL at 08:12

## 2024-08-24 RX ADMIN — SODIUM CHLORIDE 2000 MG: 900 INJECTION INTRAVENOUS at 14:28

## 2024-08-24 RX ADMIN — Medication 10 ML: at 08:12

## 2024-08-24 RX ADMIN — SODIUM CHLORIDE, POTASSIUM CHLORIDE, SODIUM LACTATE AND CALCIUM CHLORIDE 75 ML/HR: 600; 310; 30; 20 INJECTION, SOLUTION INTRAVENOUS at 07:41

## 2024-08-24 RX ADMIN — Medication 10 ML: at 21:49

## 2024-08-24 RX ADMIN — KETOROLAC TROMETHAMINE 30 MG: 30 INJECTION, SOLUTION INTRAMUSCULAR; INTRAVENOUS at 07:42

## 2024-08-24 RX ADMIN — ACETAMINOPHEN 1000 MG: 500 TABLET ORAL at 10:31

## 2024-08-24 RX ADMIN — ACETAMINOPHEN 1000 MG: 500 TABLET ORAL at 21:48

## 2024-08-24 RX ADMIN — EMPAGLIFLOZIN 10 MG: 10 TABLET, FILM COATED ORAL at 08:12

## 2024-08-24 RX ADMIN — SODIUM CHLORIDE, POTASSIUM CHLORIDE, SODIUM LACTATE AND CALCIUM CHLORIDE 75 ML/HR: 600; 310; 30; 20 INJECTION, SOLUTION INTRAVENOUS at 21:48

## 2024-08-24 RX ADMIN — ATORVASTATIN CALCIUM 10 MG: 10 TABLET, FILM COATED ORAL at 08:12

## 2024-08-24 RX ADMIN — SODIUM CHLORIDE 100 ML/HR: 9 INJECTION, SOLUTION INTRAVENOUS at 04:46

## 2024-08-24 NOTE — PLAN OF CARE
Goal Outcome Evaluation:No new issues overnight. Pts pain well controlled. VS WDL at this time. POC ongoing. Mallorie Schmitt RN

## 2024-08-24 NOTE — PLAN OF CARE
Goal Outcome Evaluation:  Plan of Care Reviewed With: patient, spouse        Progress: improving  Outcome Evaluation: VSS. RA. Ambulating well in room, to bathroom. Good urine output. IVF LR 75mL/hr infusing. Kidney US today, awaiting results. IV ABx adiministered. Good oral intake. Pain controlled with scheduled tylenol and PRN dilaudid x1. Plan of care ongoing. No further needs identified at this time.

## 2024-08-24 NOTE — H&P
[facility]  HISTORY AND PHYSICAL    Patient Name: Silva Maldonado  : 1960  MRN: [id number]  Primary Care Physician: Sharmila Oconnell APRN  Date of admission: 2024      Subjective   Subjective     Chief Complaint:  Abdominal pain    HPI:  Silva Maldonado is a 63 y.o. female with past medical history significant for diabetes and hypertension, presented to the ER complaining of abdominal pain.  Patient states she first noted intermittent right flank pain couple of weeks ago but then became constant on .  Her symptoms progressed ultimately leading to her PCP visit on .  She had an outpatient CT abdomen pelvis without contrast that revealed a nonobstructive calculus in the left kidney.  Her symptoms worsened and she went to the ER on , at which time a CT angiogram of the abdomen and pelvis was performed with no acute findings, specifically with no renal or ureteric calculi.  However, her right flank pain was so severe that she required IV analgesics.  She was subsequently transferred to [facility] for further management.  Upon interview today, she states that her pain is much improved and is only required Toradol overnight.  She denies fevers, chills, sweats.  Denies any pain with urination or blood in her urine.  She hopes to go home today.      Personal History     Past Medical History:   Diagnosis Date    Cholelithiasis     Removed in     Colon polyp 2019    Recheck i 3 years    Diabetes mellitus 2017    type II    Diverticulosis 2007    Elevated cholesterol     Gestational hypertension 1999    Hypertension     Multiple gestation     1 still born, 2 live burths    Obesity     Ovarian cyst     Pelvic prolapse     Preeclampsia     Varicella 1968           Past Surgical History:   Procedure Laterality Date    CHOLECYSTECTOMY  1998    COLONOSCOPY  2019    COLONOSCOPY N/A 2023     Procedure: COLONOSCOPY with polypectomy x 5;  Surgeon: Breanne Smith MD;  Location: [facility];  Service: Gastroenterology;  Laterality: N/A;    KNEE ARTHROSCOPY Left     LAPAROSCOPIC CHOLECYSTECTOMY  1998    TONSILLECTOMY  1983    TUBAL ABDOMINAL LIGATION  2006    VAGINAL PROLAPSE REPAIR  2006       Family History: family history includes Arthritis in her brother; Cancer in her father; Celiac disease in her daughter; Cervical cancer in her mother; Diabetes in her brother; Fibromyalgia in her daughter; Heart disease in her brother and brother; Hyperlipidemia in her brother, brother, brother, brother, father, and mother; Hypertension in her brother, brother, brother, and mother; Kidney disease in her brother; Lung cancer in her brother; Other in her daughter and daughter; Sjogren's syndrome in her daughter; Stroke in her mother.     Social History:  reports that she quit smoking about 12 years ago. Her smoking use included cigarettes. She started smoking about 38 years ago. She has a 38.4 pack-year smoking history. She has never been exposed to tobacco smoke. She has never used smokeless tobacco. She reports that she does not drink alcohol and does not use drugs.  Social History     Social History Narrative    Not on file       Medications:  Available home medication information reviewed.  Blood Glucose Monitoring Suppl, Magnesium, Semaglutide (1 MG/DOSE), cefdinir, empagliflozin, fenofibrate, glucose blood, losartan, and simvastatin    No Known Allergies    Objective   Objective     Vital Signs:   Temp:  [96 °F (35.6 °C)-98.4 °F (36.9 °C)] 96 °F (35.6 °C)  Heart Rate:  [73-81] 77  Resp:  [14-16] 16  BP: (133-174)/() 173/92       Physical Exam   General appearance: alert, awake, oriented, no acute distress   Head/Eyes: atraumatic, normocephalic, non-injected conjunctivae, EOMI  Cardiovascular: RRR, no murmurs or rubs, radial pulse full 2/4 BL   Respiratory: CTAB, no crackles or wheezes    Abdomen: soft, suprapubic, right upper quadrant and right lower quadrant tenderness to palpation, no organomegaly, bowel sounds normoactive   Musculoskeletal: moves all 4 extremities,  right CVA tenderness to palpation  Neuro/CNS: alert and oriented x3, normal speech, strength 5/5 BL UE/LE, sensation grossly intact   Skin: dry, normal color, normal temperature, no rash  Psychiatry: normal affect, normal judgment/insight, normal mood      Result Review:  I have personally reviewed the results from the time of this admission to 8/24/2024 09:54 EDT and agree with these findings:  [x]  Laboratory list / accordion  []  Microbiology  [x]  Radiology  []  EKG/Telemetry   []  Cardiology/Vascular   []  Pathology  [x]  Old records  []  Other:  Most notable findings include: Abnormal urinalysis      LAB RESULTS:      Lab 08/24/24  0801 08/23/24  0559 08/22/24 2056 08/21/24 1951 08/21/24  1528   WBC 8.53 7.78 8.02  --  9.26   HEMOGLOBIN 14.1 13.0 13.0  --  14.2   HEMATOCRIT 41.2 38.5 38.8  --  42.0   PLATELETS 235 216 238  --  261   NEUTROS ABS 5.28 4.37 4.62  --  5.61   IMMATURE GRANS (ABS) 0.09* 0.06* 0.08*  --  0.11*   LYMPHS ABS 2.13 2.33 2.25  --  2.39   MONOS ABS 0.54 0.54 0.60  --  0.67   EOS ABS 0.39 0.40 0.40  --  0.39   MCV 78.2* 76.5* 77.1*  --  77.6*   LACTATE  --   --  1.9 1.7 2.2*         Lab 08/24/24  0801 08/23/24  0559 08/22/24 2124 08/22/24 2056 08/21/24  1528   SODIUM 137 137 133*  --  133*   POTASSIUM 4.3 4.0 4.2  --  4.0   CHLORIDE 106 105 102  --  99   CO2 20.0* 21.8* 21.7*  --  21.3*   ANION GAP 11.0 10.2 9.3  --  12.7   BUN 13 11 15  --  14   CREATININE 0.71 0.56* 0.73  --  0.76   EGFR 96.9 104.0 93.7  --  89.3   GLUCOSE 181* 212* 297*  --  301*   CALCIUM 8.8 8.2* 8.4*  --  8.5*   HEMOGLOBIN A1C  --   --   --  10.30*  --          Lab 08/24/24  0801 08/23/24  0559 08/22/24 2124 08/21/24  1528   TOTAL PROTEIN 6.2 5.9* 6.0 6.8   ALBUMIN 4.0 3.6 3.6 4.0   GLOBULIN 2.2 2.3 2.4 2.8   ALT (SGPT) 35* 31 30  25   AST (SGOT) 25 29 24 25   BILIRUBIN 0.4 0.2 0.3 0.3   ALK PHOS 95 92 98 99   LIPASE  --   --  31 35         Lab 08/21/24  1843 08/21/24  1528   PROBNP  --  <36.0   HSTROP T 13 17*                 UA          4/14/2024    16:45 8/21/2024    15:16 8/22/2024    20:22   Urinalysis   Squamous Epithelial Cells, UA 3-6  0-2  7-12    Specific Gravity, UA 1.009  1.015  1.015     1.015    Ketones, UA Negative  Negative  Negative     Negative    Blood, UA Trace  Trace  Trace     Trace    Leukocytes, UA Large (3+)  Trace  Negative     Negative    Nitrite, UA Negative  Positive  Negative     Negative    RBC, UA 0-2  3-5  6-10    WBC, UA Too Numerous to Count  21-50  Too Numerous to Count    Bacteria, UA 2+  2+  2+        Microbiology Results (last 10 days)       Procedure Component Value - Date/Time    Urine Culture - Urine, Urine, Clean Catch [[id number]] Collected: 08/21/24 1936    Lab Status: Final result Specimen: Urine, Clean Catch Updated: 08/23/24 1110     Urine Culture >100,000 CFU/mL Normal Urogenital Génesis    Narrative:      Colonization of the urinary tract without infection is common. Treatment is discouraged unless the patient is symptomatic, pregnant, or undergoing an invasive urologic procedure.    Blood Culture - Blood, Hand, Left [[id number]]  (Normal) Collected: 08/21/24 1605    Lab Status: Preliminary result Specimen: Blood from Hand, Left Updated: 08/23/24 2000     Blood Culture No growth at 2 days    Blood Culture - Blood, Arm, Right [[id number]]  (Normal) Collected: 08/21/24 1555    Lab Status: Preliminary result Specimen: Blood from Arm, Right Updated: 08/23/24 2000     Blood Culture No growth at 2 days            No radiology results from the last 24 hrs        Assessment & Plan   Assessment & Plan       Pyelonephritis        Pyelonephritis  Recent Left renal calculus, non obstructive  -Patient with significant improvement; hemodynamically stable and nontoxic-appearing  -Pain appears to be controlled  with NSAIDs  -Will transition off Toradol and trial Tylenol 1000 mg 3 times daily with ibuprofen for breakthrough pain  -Continue Rocephin  -Urine culture report from 8/21 indicates growing normal nika  -Will obtain renal ultrasound to ensure no significant anatomic morphology is identified  -If patient remained stable throughout the day with the pain controlled by oral medication will plan for discharge this afternoon      HTN   DMII  -Continue Jardiance and losartan  -On Ozempic at home  -Counseled patient on frequent UTIs being a consequence of Jardiance        VTE Prophylaxis:  Mechanical VTE prophylaxis orders are present.          CODE STATUS:    Code Status and Medical Interventions: CPR (Attempt to Resuscitate); Full Support   Ordered at: 08/22/24 5545     Level Of Support Discussed With:    Patient     Code Status (Patient has no pulse and is not breathing):    CPR (Attempt to Resuscitate)     Medical Interventions (Patient has pulse or is breathing):    Full Support       Expected Discharge   Expected Discharge Date: 8/24/2024; Expected Discharge Time:      Feng Huertas DO  08/24/24

## 2024-08-25 ENCOUNTER — READMISSION MANAGEMENT (OUTPATIENT)
Dept: CALL CENTER | Facility: HOSPITAL | Age: 64
End: 2024-08-25
Payer: COMMERCIAL

## 2024-08-25 VITALS
OXYGEN SATURATION: 98 % | HEIGHT: 63 IN | BODY MASS INDEX: 41.76 KG/M2 | DIASTOLIC BLOOD PRESSURE: 89 MMHG | HEART RATE: 70 BPM | RESPIRATION RATE: 18 BRPM | TEMPERATURE: 96.2 F | SYSTOLIC BLOOD PRESSURE: 150 MMHG | WEIGHT: 235.7 LBS

## 2024-08-25 PROCEDURE — G0378 HOSPITAL OBSERVATION PER HR: HCPCS

## 2024-08-25 PROCEDURE — 99239 HOSP IP/OBS DSCHRG MGMT >30: CPT | Performed by: STUDENT IN AN ORGANIZED HEALTH CARE EDUCATION/TRAINING PROGRAM

## 2024-08-25 PROCEDURE — 25010000002 CEFTRIAXONE PER 250 MG: Performed by: STUDENT IN AN ORGANIZED HEALTH CARE EDUCATION/TRAINING PROGRAM

## 2024-08-25 PROCEDURE — 25010000002 HYDROMORPHONE PER 4 MG: Performed by: STUDENT IN AN ORGANIZED HEALTH CARE EDUCATION/TRAINING PROGRAM

## 2024-08-25 PROCEDURE — 96376 TX/PRO/DX INJ SAME DRUG ADON: CPT

## 2024-08-25 RX ORDER — TRAMADOL HYDROCHLORIDE 50 MG/1
50 TABLET ORAL EVERY 6 HOURS PRN
Qty: 6 TABLET | Refills: 0 | Status: SHIPPED | OUTPATIENT
Start: 2024-08-25

## 2024-08-25 RX ORDER — CEFDINIR 300 MG/1
300 CAPSULE ORAL 2 TIMES DAILY
Start: 2024-08-26 | End: 2024-08-30

## 2024-08-25 RX ADMIN — HYDROMORPHONE HYDROCHLORIDE 0.5 MG: 1 INJECTION, SOLUTION INTRAMUSCULAR; INTRAVENOUS; SUBCUTANEOUS at 06:22

## 2024-08-25 RX ADMIN — Medication 10 ML: at 08:26

## 2024-08-25 RX ADMIN — ATORVASTATIN CALCIUM 10 MG: 10 TABLET, FILM COATED ORAL at 08:25

## 2024-08-25 RX ADMIN — EMPAGLIFLOZIN 10 MG: 10 TABLET, FILM COATED ORAL at 08:25

## 2024-08-25 RX ADMIN — SODIUM CHLORIDE 2000 MG: 900 INJECTION INTRAVENOUS at 10:32

## 2024-08-25 RX ADMIN — ACETAMINOPHEN 1000 MG: 500 TABLET ORAL at 08:25

## 2024-08-25 RX ADMIN — LOSARTAN POTASSIUM 25 MG: 50 TABLET, FILM COATED ORAL at 08:25

## 2024-08-25 NOTE — DISCHARGE SUMMARY
"    [facility]  DISCHARGE SUMMARY    Patient Name: Silva Maldonado  : 1960  MRN: [id number]    Date of Admission: 2024  8:04 PM  Date of Discharge:  2024  Primary Care Physician: Sharmila Oconnell APRN    Consults       No orders found from 2024 to 2024.            Hospital Course     Presenting Problem: Pyelonephritis    Active Hospital Problems    Diagnosis  POA    **Pyelonephritis [N12]  Yes    Primary hypertension [I10]  Yes      Resolved Hospital Problems   No resolved problems to display.          Hospital Course:  Silva Maldonado is a 63 y.o. female with past medical history significant for diabetes and hypertension, who was admitted from the [place] ER for pyelonephritis complicated by uncontrolled right flank pain.  Imaging studies were significant for a left nephrolithiasis that was nonobstructive.  Her urinalysis on  was suggestive of acute cystitis, with urine culture growing \"normal urogenital nika\".  Repeat urine culture with no growth to date.  Patient was treated with Rocephin and IV fluid resuscitation with improvement in her symptoms.  Her pain was controlled with Tylenol.  Despite her workup not being fully suggestive of pyelonephritis, she demonstrated an improvement on the above therapies to the point that she would be able to continue her treatment course at home.  Patient is instructed to return to the ER with any worsening of symptoms.  She is to follow with her primary care physician next week.    Pyelonephritis  Right flank pain  Left renal calculus, non obstructive  -Patient improved on Rocephin and IV fluid resuscitation  -Initial urine culture on  grew normal urogenital nika, repeat on  with no growth to date  -CT imaging unremarkable  -Renal ultrasound with no significant findings  -Lab work largely unremarkable   -Pain controlled with Tylenol and only required 1 dose of " Dilaudid overnight  -Hemodynamically stable  -Discharged instructed to resume her previous cefdinir prescription beginning on 8/26, as her last dose of Rocephin was administered prior to discharge 8/25  -Tramadol prescribed for breakthrough pain        HTN   DMII  -Resume home regiment  -Counseled patient on frequent UTIs being a consequence of Jardiance      Discharge Follow Up Recommendations for outpatient labs/diagnostics:  Follow-up with PCP in 1 week    Day of Discharge     HPI:   Patient states she feels well today and states that her pain is much improved though still is not completely resolved.  She denies any fevers, chills, sweats.  She wishes to go home.      Vital Signs:   Temp:  [96.2 °F (35.7 °C)-96.7 °F (35.9 °C)] 96.2 °F (35.7 °C)  Heart Rate:  [70-85] 70  Resp:  [16-18] 18  BP: (150-198)/() 150/89      Physical Exam:  General appearance: alert, awake, oriented, no acute distress   Cardiovascular: RRR, no murmurs or rubs, radial pulse full 2/4 BL   Respiratory: CTAB, no crackles or wheezes   Abdomen: soft, mild right flank tenderness to palpation, no organomegaly, bowel sounds normoactive    Neuro/CNS: alert and oriented x3, normal speech    Pertinent  and/or Most Recent Results     LAB RESULTS:      Lab 08/24/24  0801 08/23/24  0559 08/22/24  2056 08/21/24  1951 08/21/24  1528   WBC 8.53 7.78 8.02  --  9.26   HEMOGLOBIN 14.1 13.0 13.0  --  14.2   HEMATOCRIT 41.2 38.5 38.8  --  42.0   PLATELETS 235 216 238  --  261   NEUTROS ABS 5.28 4.37 4.62  --  5.61   IMMATURE GRANS (ABS) 0.09* 0.06* 0.08*  --  0.11*   LYMPHS ABS 2.13 2.33 2.25  --  2.39   MONOS ABS 0.54 0.54 0.60  --  0.67   EOS ABS 0.39 0.40 0.40  --  0.39   MCV 78.2* 76.5* 77.1*  --  77.6*   LACTATE  --   --  1.9 1.7 2.2*         Lab 08/24/24  0801 08/23/24  0559 08/22/24 2124 08/22/24 2056 08/21/24  1528   SODIUM 137 137 133*  --  133*   POTASSIUM 4.3 4.0 4.2  --  4.0   CHLORIDE 106 105 102  --  99   CO2 20.0* 21.8* 21.7*  --  21.3*    ANION GAP 11.0 10.2 9.3  --  12.7   BUN 13 11 15  --  14   CREATININE 0.71 0.56* 0.73  --  0.76   EGFR 96.9 104.0 93.7  --  89.3   GLUCOSE 181* 212* 297*  --  301*   CALCIUM 8.8 8.2* 8.4*  --  8.5*   HEMOGLOBIN A1C  --   --   --  10.30*  --          Lab 08/24/24  0801 08/23/24  0559 08/22/24  2124 08/21/24  1528   TOTAL PROTEIN 6.2 5.9* 6.0 6.8   ALBUMIN 4.0 3.6 3.6 4.0   GLOBULIN 2.2 2.3 2.4 2.8   ALT (SGPT) 35* 31 30 25   AST (SGOT) 25 29 24 25   BILIRUBIN 0.4 0.2 0.3 0.3   ALK PHOS 95 92 98 99   LIPASE  --   --  31 35         Lab 08/21/24  1843 08/21/24  1528   PROBNP  --  <36.0   HSTROP T 13 17*                 Brief Urine Lab Results  (Last result in the past 365 days)        Color   Clarity   Blood   Leuk Est   Nitrite   Protein   CREAT   Urine HCG        08/22/24 2022 Yellow   Clear   Trace   Negative   Negative   Negative           08/22/24 2022 Yellow   Clear   Trace   Negative   Negative   Negative                 Microbiology Results (last 10 days)       Procedure Component Value - Date/Time    Urine Culture - Urine, Urine, Clean Catch [[id number]]  (Normal) Collected: 08/22/24 2022    Lab Status: Final result Specimen: Urine, Clean Catch Updated: 08/24/24 1158     Urine Culture No growth    Urine Culture - Urine, Urine, Clean Catch [[id number]] Collected: 08/21/24 1936    Lab Status: Final result Specimen: Urine, Clean Catch Updated: 08/23/24 1110     Urine Culture >100,000 CFU/mL Normal Urogenital Génesis    Narrative:      Colonization of the urinary tract without infection is common. Treatment is discouraged unless the patient is symptomatic, pregnant, or undergoing an invasive urologic procedure.    Blood Culture - Blood, Hand, Left [[id number]]  (Normal) Collected: 08/21/24 1605    Lab Status: Preliminary result Specimen: Blood from Hand, Left Updated: 08/24/24 2000     Blood Culture No growth at 3 days    Blood Culture - Blood, Arm, Right [[id number]]  (Normal) Collected: 08/21/24 0449    Lab Status:  Preliminary result Specimen: Blood from Arm, Right Updated: 08/24/24 2000     Blood Culture No growth at 3 days            US Renal Limited    Result Date: 8/24/2024  US RENAL LIMITED Date of Exam: 8/24/2024 1:51 PM EDT Indication: nephrolithiasis noted on CT, with worsening flank pain. Comparison: CT abdomen/pelvis 8/21/2024. Technique: Grayscale and color Doppler ultrasound evaluation of the kidneys and urinary bladder was performed. Findings: Right Kidney: Right kidney measures 12.3 cm in long axis. Renal cortical thickness and echogenicity are within normal limits.No suspicious appearing lesions.No hydronephrosis.No sonographically evident nephrolithiasis. Left Kidney:  Left kidney measures 10.8 cm in long axis. Renal cortical thickness and echogenicity are within normal limits.No suspicious appearing lesions.No hydronephrosis.there is a 9 mm calculus seen in the left kidney. Bladder: Poorly visualized.     Impression: No hydronephrosis. Nonobstructing left nephrolithiasis. Electronically Signed: Sorin Badillo  8/24/2024 4:44 PM EDT  Workstation ID: [id number]    CT Angiogram Abdomen Pelvis    Result Date: 8/21/2024  CT ANGIOGRAM ABDOMEN PELVIS Date of Exam: 8/21/2024 7:04 PM EDT Indication: right flank pain. Comparison: 4/14/2024 Technique: CTA of the abdomen and pelvis was performed after the uneventful intravenous administration of 90 cc Isovue-370 IV contrast . Reconstructed coronal and sagittal images were also obtained. In addition, a 3-D volume rendered image was created for interpretation. Automated exposure control and iterative reconstruction methods were used. FINDINGS: Lung bases: No masses. No consolidation. Liver:No masses. No intrahepatic biliary ductal dilatation. Spleen:No masses. No perisplenic hematoma. Pancreas:No pancreatic masses. No evidence of pancreatitis. Gallbladder and common bile duct: Prior cholecystectomy Adrenal glands:No adrenal masses Kidneys and ureters:No kidney stones. No  renal masses.No calculi present within the ureters. Normal caliber ureters. Urinary bladder:No urinary bladder wall thickening. No bladder masses. Small bowel:Normal caliber small bowel. Large bowel:No diverticulosis or diverticulitis. No large bowel masses are appreciated Appendix: Not seen however there is no evidence of appendicitis. GENITOURINARY: The uterus is normal. Bilateral tubal ligation. Ascites or pneumoperitoneum:None. Adenopathy:None present Osseous structures: Mild degenerative changes of the hips and lumbar spine. Other findings: Small fat-containing umbilical hernia. The descending thoracic aorta is normal. Normal appearance of the celiac axis and superior mesenteric artery. Mild atheromatous disease at the origin of the left renal artery. The inferior mesenteric  artery is normal. Scattered shotty retroperitoneal lymph nodes.     Mild atheromatous disease. No vessel occlusion or stenosis. No renal or ureteric calculi. No rib fractures. Electronically Signed: Edgar Allen MD  8/21/2024 7:36 PM EDT  Workstation ID: [id number]    XR Chest 2 View    Result Date: 8/21/2024  XR CHEST 2 VW Date of Exam: 8/21/2024 5:24 PM EDT Indication: flank pain Comparison: Chest radiograph 4/14/2024 Findings: Mediastinum: Cardiomediastinal silhouette appears unchanged. Lungs: Mild patchy left basal opacities. Pleura: No pleural effusion or pneumothorax. Bones and soft tissues: No acute osseous abnormality.     Impression: Mild patchy left basal opacities which may represent atelectasis. Pneumonia or aspiration can be considered in the appropriate clinical setting. Electronically Signed: Sorin Badillo  8/21/2024 5:35 PM EDT  Workstation ID: [id number]    CT Abdomen Pelvis Without Contrast    Result Date: 8/20/2024  FINAL REPORT TECHNIQUE: null CLINICAL HISTORY: abdominal pain RUQ RIGHT FLANK X SEVERAL WEEKS AND IS NOT GETTING ANY BETTER. COMPARISON: null FINDINGS: CT abdomen and pelvis without contrast Comparison: None  Findings: The lung bases are clear. Liver length estimated at 20 cm. Hypodense liver parenchyma. Unremarkable spleen, pancreas and adrenal glands. 1 cm calculus within the left kidney. No ureteral calculus or hydronephrosis. Prior cholecystectomy. No bowel obstruction, pneumoperitoneum, or pneumatosis. Colonic diverticulosis without diverticulitis There are bilateral adnexal surgical clips. Remaining pelvic contents are unremarkable. The appendix is not definitively seen. There are no secondary findings to suggest appendicitis. No acute fracture.     IMPRESSION: 1. Hepatomegaly with fatty infiltration of the liver. 2. Nonobstructive calculus within the left kidney. 3. There is an umbilical hernia containing fat. Authenticated and Electronically Signed by Uyen Eisenberg MD on 08/20/2024 05:21:40 PM    XR Abdomen Flat & Upright (In Office)    Result Date: 8/20/2024  PROCEDURE: XR ABDOMEN FLAT AND UPRIGHT-  HISTORY: abdominal pain; R14.0-Abdominal distension (gaseous); R10.11-Right upper quadrant pain  COMPARISON: None.  FINDINGS: An AP view of the abdomen and pelvis demonstrates a nonobstructive bowel gas pattern. There are changes from prior cholecystectomy. There is no abnormal calcification identified.      Nonobstructive bowel gas pattern.        Images were reviewed, interpreted, and dictated by Dr. Marycruz Maldonado MD Transcribed by Patricia Rivero PA-C.  This report was signed and finalized on 8/20/2024 12:11 PM by Marycruz Maldonado MD.                   Plan for Follow-up of Pending Labs/Results:     Discharge Details        Discharge Medications        New Medications        Instructions Start Date   traMADol 50 MG tablet  Commonly known as: Ultram   50 mg, Oral, Every 6 Hours PRN             Continue These Medications        Instructions Start Date   Blood Glucose Monitoring Suppl kit   1 each, Does not apply, 4 Times Daily      cefdinir 300 MG capsule  Commonly known as: OMNICEF   300 mg, Oral, 2 Times Daily   Start  Date: August 26, 2024     fenofibrate 145 MG tablet  Commonly known as: Tricor   145 mg, Oral, Daily      glucose blood test strip  Commonly known as: Glucose Meter Test   Use as instructed      Jardiance 10 MG tablet tablet  Generic drug: empagliflozin   10 mg, Oral, Daily      losartan 25 MG tablet  Commonly known as: COZAAR   25 mg, Oral, Daily      Magnesium 250 MG tablet   1 tablet, Oral, Nightly PRN      Ozempic (1 MG/DOSE) 4 MG/3ML solution pen-injector  Generic drug: Semaglutide (1 MG/DOSE)   1 mg, Subcutaneous, Weekly      simvastatin 20 MG tablet  Commonly known as: ZOCOR   20 mg, Oral, Every Evening               No Known Allergies      Discharge Disposition:  Home or Self Care    Diet:  Hospital:  Diet Order   Procedures    Diet: Diabetic; Consistent Carbohydrate; Fluid Consistency: Thin (IDDSI 0)       Diet Instructions       Diet: Diabetic Diets; Consistent Carbohydrate; Thin (IDDSI 0)      Discharge Diet: Diabetic Diets    Diabetic Diet: Consistent Carbohydrate    Fluid Consistency: Thin (IDDSI 0)             Activity:  Activity Instructions       Activity as Tolerated              Restrictions or Other Recommendations:         CODE STATUS:    Code Status and Medical Interventions: CPR (Attempt to Resuscitate); Full Support   Ordered at: 08/22/24 2343     Level Of Support Discussed With:    Patient     Code Status (Patient has no pulse and is not breathing):    CPR (Attempt to Resuscitate)     Medical Interventions (Patient has pulse or is breathing):    Full Support       Future Appointments   Date Time Provider Department Center   8/30/2024  2:30 PM Diane Stephens APRN [facility]   10/18/2024  9:15 AM Sharmila Oconnell APRN [facility]       Additional Instructions for the Follow-ups that You Need to Schedule       Discharge Follow-up with PCP   As directed       Currently Documented PCP:    Sharmila Oconnell APRN    PCP Phone Number:    [phone]     Follow Up Details: 1  week                      Feng Huertas,   08/25/24      Time Spent on Discharge:  I spent  35  minutes on this discharge activity which included: face-to-face encounter with the patient, reviewing the data in the system, coordination of the care with the nursing staff as well as consultants, documentation, and entering orders.

## 2024-08-25 NOTE — PLAN OF CARE
Goal Outcome Evaluation:  Plan of Care Reviewed With: patient, spouse        Progress: improving  Outcome Evaluation: VSS. RA. Ambulating in room, good urine output. Pain controlled with Tylenol and 1 PRN dilaudid. Last dose of Rocephin given, discharge instructions given including follow up information, med side effects, medication list and s/s of complications. No further needs identified at this time, patient awaiting transport down to 's vehicle.

## 2024-08-25 NOTE — OUTREACH NOTE
Prep Survey      Flowsheet Row Responses   [facility] patient discharged from? [place]   Is LACE score < 7 ? No   Eligibility [facility]   Date of Admission 08/22/24   Date of Discharge 08/25/24   Discharge Disposition Home or Self Care   Discharge diagnosis *Pyelonephritis   Does the patient have one of the following disease processes/diagnoses(primary or secondary)? Other   Does the patient have Home health ordered? No   Is there a DME ordered? No   Prep survey completed? Yes            AJAY MIRELES - Registered Nurse

## 2024-08-25 NOTE — PLAN OF CARE
Goal Outcome Evaluation:  Plan of Care Reviewed With: patient, spouse        Progress: improving  Outcome Evaluation: VSS on RA. A&O x4, pleasant. Ambulating independently. Good UOP. Pain rated between 2 and 5 most of the night. PRN dilaudid given x1 for pain 7/10. IV fluids continue. Pt slept well between care. Pt hopefull to discharge home in AM.

## 2024-08-26 ENCOUNTER — TRANSITIONAL CARE MANAGEMENT TELEPHONE ENCOUNTER (OUTPATIENT)
Dept: CALL CENTER | Facility: HOSPITAL | Age: 64
End: 2024-08-26
Payer: COMMERCIAL

## 2024-08-26 LAB
BACTERIA SPEC AEROBE CULT: NORMAL
BACTERIA SPEC AEROBE CULT: NORMAL

## 2024-08-26 NOTE — OUTREACH NOTE
Call Center TCM Note      Flowsheet Row Responses   [facility] patient discharged from? [place]   Does the patient have one of the following disease processes/diagnoses(primary or secondary)? Other   TCM attempt successful? Yes   Call start time 1156   Call end time 1158   Discharge diagnosis *Pyelonephritis   Meds reviewed with patient/caregiver? Yes   Is the patient having any side effects they believe may be caused by any medication additions or changes? No   Does the patient have all medications ordered at discharge? Yes   Is the patient taking all medications as directed (includes completed medication regime)? Yes   Comments HOSP DC FU appt 8/28/24 1015 am   Does the patient have an appointment with their PCP within 7-14 days of discharge? Yes   Has home health visited the patient within 72 hours of discharge? N/A   Psychosocial issues? No   Did the patient receive a copy of their discharge instructions? Yes   Nursing interventions Reviewed instructions with patient   What is the patient's perception of their health status since discharge? Improving   Is the patient/caregiver able to teach back signs and symptoms related to disease process for when to call PCP? Yes   Is the patient/caregiver able to teach back signs and symptoms related to disease process for when to call 911? Yes   Is the patient/caregiver able to teach back the hierarchy of who to call/visit for symptoms/problems? PCP, Specialist, Home health nurse, Urgent Care, ED, 911 Yes   TCM call completed? Yes   Wrap up additional comments Pt reports she is does better. Has appts set. No needs.   Call end time 1158            Milagro Portillo RN    8/26/2024, 11:58 EDT

## 2024-08-28 ENCOUNTER — OFFICE VISIT (OUTPATIENT)
Dept: FAMILY MEDICINE CLINIC | Facility: CLINIC | Age: 64
End: 2024-08-28
Payer: COMMERCIAL

## 2024-08-28 VITALS
DIASTOLIC BLOOD PRESSURE: 82 MMHG | HEART RATE: 98 BPM | WEIGHT: 229.6 LBS | HEIGHT: 63 IN | RESPIRATION RATE: 16 BRPM | TEMPERATURE: 97.5 F | SYSTOLIC BLOOD PRESSURE: 138 MMHG | OXYGEN SATURATION: 99 % | BODY MASS INDEX: 40.68 KG/M2

## 2024-08-28 DIAGNOSIS — R10.9 FLANK PAIN: ICD-10-CM

## 2024-08-28 DIAGNOSIS — N20.0 KIDNEY STONE ON LEFT SIDE: ICD-10-CM

## 2024-08-28 DIAGNOSIS — N12 PYELONEPHRITIS: Primary | Chronic | ICD-10-CM

## 2024-08-28 RX ORDER — IBUPROFEN 600 MG/1
600 TABLET, FILM COATED ORAL EVERY 8 HOURS PRN
Start: 2024-08-28

## 2024-08-28 NOTE — PROGRESS NOTES
"Transitional Care Follow Up Visit  Subjective     Silva Maldonado is a 63 y.o. female who presents for a transitional care management visit.    Within 48 business hours after discharge our office contacted her via telephone to coordinate her care and needs.      I reviewed and discussed the details of that call along with the discharge summary, hospital problems, inpatient lab results, inpatient diagnostic studies, and consultation reports with Silva.     Current outpatient and discharge medications have been reconciled for the patient.  Reviewed by: ROSY Cruz          8/25/2024     1:02 PM   Date of TCM Phone Call   [facility]   Date of Admission 8/22/2024   Date of Discharge 8/25/2024   Discharge Disposition Home or Self Care     Risk for Readmission (LACE) Score: 11 (8/25/2024  6:00 AM)      History of Present Illness  Recent hospitalization for acute pyelonephritis.     Discharged with prescription for cefdinir. Still currently taking.     Has been taking tylenol every 6 hours. Was prescribed tramadol PRN but has been trying not to take it.    Previously referred to urology. Appointment Friday.      Course During Hospital Stay:  Hospital Course:  Silva Maldonado is a 63 y.o. female with past medical history significant for diabetes and hypertension, who was admitted from the [facility] for pyelonephritis complicated by uncontrolled right flank pain.  Imaging studies were significant for a left nephrolithiasis that was nonobstructive.  Her urinalysis on 8/21 was suggestive of acute cystitis, with urine culture growing \"normal urogenital nika\".  Repeat urine culture with no growth to date.  Patient was treated with Rocephin and IV fluid resuscitation with improvement in her symptoms.  Her pain was controlled with Tylenol.  Despite her workup not being fully suggestive of pyelonephritis, she demonstrated an improvement on the above therapies to the point " "that she would be able to continue her treatment course at home.  Patient is instructed to return to the ER with any worsening of symptoms.  She is to follow with her primary care physician next week.     Pyelonephritis  Right flank pain  Left renal calculus, non obstructive  -Patient improved on Rocephin and IV fluid resuscitation  -Initial urine culture on August 21 grew normal urogenital nika, repeat on August 22 with no growth to date  -CT imaging unremarkable  -Renal ultrasound with no significant findings  -Lab work largely unremarkable   -Pain controlled with Tylenol and only required 1 dose of Dilaudid overnight  -Hemodynamically stable  -Discharged instructed to resume her previous cefdinir prescription beginning on 8/26, as her last dose of Rocephin was administered prior to discharge 8/25  -Tramadol prescribed for breakthrough pain        HTN   DMII  -Resume home regiment  -Counseled patient on frequent UTIs being a consequence of Jardiance     The following portions of the patient's history were reviewed and updated as appropriate: allergies, current medications, past family history, past medical history, past social history, past surgical history, and problem list.    Review of Systems   Gastrointestinal:  Negative for abdominal distention, abdominal pain, diarrhea, nausea and vomiting.   Genitourinary:  Positive for flank pain. Negative for dysuria and frequency.       Objective   /82   Pulse 98   Temp 97.5 °F (36.4 °C) (Temporal)   Resp 16   Ht 160 cm (63\")   Wt 104 kg (229 lb 9.6 oz)   SpO2 99%   BMI 40.67 kg/m²   Physical Exam  Vitals and nursing note reviewed.   Cardiovascular:      Rate and Rhythm: Normal rate and regular rhythm.   Pulmonary:      Effort: Pulmonary effort is normal.      Breath sounds: Normal breath sounds.   Abdominal:      General: Bowel sounds are normal.      Palpations: Abdomen is soft.   Neurological:      Mental Status: She is alert and oriented to person, " place, and time.   Psychiatric:         Mood and Affect: Mood normal.         Behavior: Behavior normal.         Assessment & Plan   Diagnoses and all orders for this visit:    1. Pyelonephritis (Primary)  -     ibuprofen (ADVIL,MOTRIN) 600 MG tablet; Take 1 tablet by mouth Every 8 (Eight) Hours As Needed for Mild Pain or Moderate Pain.    2. Kidney stone on left side  -     ibuprofen (ADVIL,MOTRIN) 600 MG tablet; Take 1 tablet by mouth Every 8 (Eight) Hours As Needed for Mild Pain or Moderate Pain.    3. Flank pain  -     ibuprofen (ADVIL,MOTRIN) 600 MG tablet; Take 1 tablet by mouth Every 8 (Eight) Hours As Needed for Mild Pain or Moderate Pain.    Risks, benefits, and potential side effects of current/new medications reviewed with patient.  Patient voiced understanding and wished to proceed with treatment.    Patient was encouraged to keep me informed of any acute changes, lack of improvement, or any new concerning symptoms.    Patient to return to ED for worsening pain, fever, N/V.    Sharmila Oconnell, ROSY    08/28/2024

## 2024-08-28 NOTE — TELEPHONE ENCOUNTER
PCP ADDED A YRLY PHYSICAL CODE FOR DOS 7/12/2024. I LOOKED IN THE GUARANTOR CHART AND THE BEST I CAN TELL IS THAT HERE IS NOW  CHARGES PENDING FOR THAT DATE (OFFICE VISIT & YRLY PHYSICAL VISIT). NOT SURE IF ANYTHING ELSE NEEDS TO BE DONE ON OUR END TO REMOVE THE OFFICE VISIT CHARGE & BE ABLE TO REBILL THAT AS A PHYSICAL. PLEASE ADVISE.

## 2024-08-30 ENCOUNTER — OFFICE VISIT (OUTPATIENT)
Dept: UROLOGY | Facility: CLINIC | Age: 64
End: 2024-08-30
Payer: COMMERCIAL

## 2024-08-30 VITALS
SYSTOLIC BLOOD PRESSURE: 158 MMHG | BODY MASS INDEX: 40.57 KG/M2 | WEIGHT: 229 LBS | OXYGEN SATURATION: 99 % | HEART RATE: 85 BPM | HEIGHT: 63 IN | TEMPERATURE: 97.8 F | DIASTOLIC BLOOD PRESSURE: 94 MMHG

## 2024-08-30 DIAGNOSIS — N20.0 KIDNEY STONES: Primary | ICD-10-CM

## 2024-08-30 LAB
BILIRUB BLD-MCNC: NEGATIVE MG/DL
CLARITY, POC: ABNORMAL
COLOR UR: ABNORMAL
EXPIRATION DATE: ABNORMAL
GLUCOSE UR STRIP-MCNC: ABNORMAL MG/DL
KETONES UR QL: NEGATIVE
LEUKOCYTE EST, POC: NEGATIVE
Lab: ABNORMAL
NITRITE UR-MCNC: NEGATIVE MG/ML
PH UR: 5.5 [PH] (ref 5–8)
PROT UR STRIP-MCNC: NEGATIVE MG/DL
RBC # UR STRIP: ABNORMAL /UL
SP GR UR: 1.01 (ref 1–1.03)
UROBILINOGEN UR QL: ABNORMAL

## 2024-08-30 NOTE — PROGRESS NOTES
Office Visit Kidney Stone      Patient Name: Silva Maldonado  : 1960   MRN: [id number]     Chief Complaint:   Chief Complaint   Patient presents with    Establish Care    Nephrolithiasis     Pylenonephritis  Still having lingering pain and urinary symptoms       Referring Provider: Sharmila Oconnell APRN    History of Present Illness: Silva Maldonado is a 63 y.o. female who presents today for history of kidney stones.  Patient was admitted to hospital on 24 with pyelonephritis and uncontrolled right flank pain.  Patient was treated with rocephin and IVF, urine culture negative.  Patient noted to have 6.1 mm left renal stone, no hydronephrosis noted.  Patient reports intermittent left flank pain, bladder discomfort, and right flank/lower abdominal pain.  Still having some intermittent dysuria. No fever, chills, nausea, vomiting, hematuria.    Stone prevention medications: first stone    Stone related history  Family history of stones:   yes, brother and daughter  Renal disease or anatomic abnormality: no  Malabsorptive disease or gastric bypass: no  Frequent UTI's    yes2-3 this year   Parathyroid disease    no    Diet  64-96 ounces of water per day  3 sodas per day  Low intake of fruit/vegetables-yes  Lots of salt/sodium-no  Lots of meat/protein from animal source-yes  Fast food 1-2 times a week.     Subjective      Review of System: As noted in HPI.    Past Medical History:   Past Medical History:   Diagnosis Date    Cholelithiasis     Removed in     Colon polyp 2019    Recheck i 3 years    Diabetes mellitus 2017    type II    Diverticulosis 2007    Elevated cholesterol     Gestational hypertension 1999    Hypertension     Kidney stone 26    Multiple gestation     1 still born, 2 live burths    Obesity     Ovarian cyst 1999    Pelvic prolapse     Preeclampsia     Varicella 1968       Past Surgical History:   Past Surgical History:   Procedure  Laterality Date    CHOLECYSTECTOMY      COLONOSCOPY  2019    COLONOSCOPY N/A 2023    Procedure: COLONOSCOPY with polypectomy x 5;  Surgeon: Breanne Smith MD;  Location: [facility] ENDOSCOPY;  Service: Gastroenterology;  Laterality: N/A;    KNEE ARTHROSCOPY Left     LAPAROSCOPIC CHOLECYSTECTOMY      TONSILLECTOMY      TUBAL ABDOMINAL LIGATION      VAGINAL PROLAPSE REPAIR         Family History:   Family History   Problem Relation Age of Onset    Cervical cancer Mother     Hyperlipidemia Mother     Stroke Mother     Hypertension Mother     Cancer Father         Brain tumor - unsure if primary or not    Hyperlipidemia Father     Arthritis Brother     Lung cancer Brother     Heart disease Brother     Hyperlipidemia Brother     Hypertension Brother     Diabetes Brother     Hyperlipidemia Brother     Kidney disease Brother     Hypertension Brother     Heart disease Brother     Hyperlipidemia Brother     Hypertension Brother     Hyperlipidemia Brother     Celiac disease Daughter     Fibromyalgia Daughter     Sjogren's syndrome Daughter     Colon cancer Neg Hx     Breast cancer Neg Hx        Social History:   Social History     Socioeconomic History    Marital status:    Tobacco Use    Smoking status: Former     Current packs/day: 0.00     Average packs/day: 1.5 packs/day for 25.6 years (38.4 ttl pk-yrs)     Types: Cigarettes     Start date: 1986     Quit date: 2012     Years since quittin.6     Passive exposure: Past    Smokeless tobacco: Never   Vaping Use    Vaping status: Never Used   Substance and Sexual Activity    Alcohol use: Never    Drug use: Never    Sexual activity: Yes     Partners: Male     Birth control/protection: Post-menopausal, Tubal ligation       Medications:     Current Outpatient Medications:     Blood Glucose Monitoring Suppl kit, 1 each 4 (Four) Times a Day., Disp: 1 each, Rfl: 0    glucose blood (Glucose Meter Test) test strip, Use as  "instructed, Disp: 200 each, Rfl: 12    ibuprofen (ADVIL,MOTRIN) 600 MG tablet, Take 1 tablet by mouth Every 8 (Eight) Hours As Needed for Mild Pain or Moderate Pain., Disp: , Rfl:     Jardiance 10 MG tablet tablet, TAKE 1 TABLET BY MOUTH DAILY, Disp: 30 tablet, Rfl: 2    losartan (COZAAR) 25 MG tablet, Take 1 tablet by mouth Daily., Disp: 30 tablet, Rfl: 2    Magnesium 250 MG tablet, Take 1 tablet by mouth At Night As Needed., Disp: , Rfl:     Semaglutide, 1 MG/DOSE, (Ozempic, 1 MG/DOSE,) 4 MG/3ML solution pen-injector, Inject 1 mg under the skin into the appropriate area as directed 1 (One) Time Per Week., Disp: 3 mL, Rfl: 2    simvastatin (ZOCOR) 20 MG tablet, Take 1 tablet by mouth Every Evening., Disp: 30 tablet, Rfl: 3    traMADol (Ultram) 50 MG tablet, Take 1 tablet by mouth Every 6 (Six) Hours As Needed for Severe Pain for up to 6 doses., Disp: 6 tablet, Rfl: 0    Allergies:   No Known Allergies    Objective     Physical Exam:   Vital Signs:   Vitals:    08/30/24 1415   BP: 158/94   Pulse: 85   Temp: 97.8 °F (36.6 °C)   SpO2: 99%   Weight: 104 kg (229 lb)   Height: 160 cm (62.99\")     Body mass index is 40.58 kg/m².   Physical Exam  Vitals and nursing note reviewed.   Constitutional:       General: She is awake. She is not in acute distress.     Appearance: She is obese.   Pulmonary:      Effort: Pulmonary effort is normal.   Abdominal:      Tenderness: There is right CVA tenderness. There is no left CVA tenderness.   Neurological:      Mental Status: She is alert and oriented to person, place, and time. Mental status is at baseline.   Psychiatric:         Mood and Affect: Mood normal.         Behavior: Behavior is cooperative.          Labs:   Brief Urine Lab Results  (Last result in the past 365 days)        Color   Clarity   Blood   Leuk Est   Nitrite   Protein   CREAT   Urine HCG        08/30/24 1425 Straw   Cloudy   Moderate   Negative   Negative   Negative                   Urine Culture          " "8/21/2024    19:36 8/22/2024    20:22   Urine Culture   Urine Culture >100,000 CFU/mL Normal Urogenital Génesis  No growth         Lab Results   Component Value Date    GLUCOSE 181 (H) 08/24/2024    CALCIUM 8.8 08/24/2024     08/24/2024    K 4.3 08/24/2024    CO2 20.0 (L) 08/24/2024     08/24/2024    BUN 13 08/24/2024    CREATININE 0.71 08/24/2024    BCR 18.3 08/24/2024    ANIONGAP 11.0 08/24/2024       Lab Results   Component Value Date    WBC 8.53 08/24/2024    HGB 14.1 08/24/2024    HCT 41.2 08/24/2024    MCV 78.2 (L) 08/24/2024     08/24/2024       Stone Analysis  No components found for: \"[id number]\", \"[id number]\", \"[id number]\"    Images:   US Renal Limited    Result Date: 8/24/2024  Impression: No hydronephrosis. Nonobstructing left nephrolithiasis. Electronically Signed: Sorin Badillo  8/24/2024 4:44 PM EDT  Workstation ID: [id number]    CT Angiogram Abdomen Pelvis    Result Date: 8/21/2024  Mild atheromatous disease. No vessel occlusion or stenosis. No renal or ureteric calculi. No rib fractures. Electronically Signed: Edgar Allen MD  8/21/2024 7:36 PM EDT  Workstation ID: [id number]    CT Abdomen Pelvis Without Contrast    Result Date: 8/20/2024  IMPRESSION: 1. Hepatomegaly with fatty infiltration of the liver. 2. Nonobstructive calculus within the left kidney. 3. There is an umbilical hernia containing fat. Authenticated and Electronically Signed by Uyen Eisenberg MD on 08/20/2024 05:21:40 PM    XR Abdomen Flat & Upright (In Office)    Result Date: 8/20/2024  Nonobstructive bowel gas pattern.        Images were reviewed, interpreted, and dictated by Dr. Marycruz Maldonado MD Transcribed by Patricia Rivero PA-C.  This report was signed and finalized on 8/20/2024 12:11 PM by Marycruz Maldonado MD.        I have reviewed the above imaging and labs.    Assessment / Plan      Assessment:   Diagnoses and all orders for this visit:    1. Kidney stones (Primary)  -     POC Urinalysis Dipstick, " Automated         63 y.o. female presents today for 6.1 mm left renal stone, no hydronephrosis noted.  Patient was admitted to hospital on 08/22/24 with pyelonephritis and uncontrolled right flank pain.  Patient was treated with rocephin and IVF, urine culture negative and no imaging to support pyelonephritis.  Lactic was elevated at 2.2 and improved with treatment.  She is currently on cefdinir and has around 2 days left, UA today not indicative of infection.  Patient reports intermittent left flank pain, bladder discomfort, and right flank/lower abdominal pain.  Still having some intermittent dysuria. No fever, chills, nausea, vomiting, or hematuria.    We had informed discussion about the causes of stones and the main treatments for nephrolithiasis.  The 3 main surgical treatments for stones are percutaneous nephrolithotomy, ureteroscopy and laser lithotripsy, and shockwave lithotripsy. Based on patient factors and the stone size and location, I recommend left URS/LL with stent insertion. The patient is discussed with Dr. Felton who independently reviews and interprets associated imaging and agrees with plan.  We discussed the risks, benefits, and alternatives to this therapy.  The main risks that we discussed were bleeding, urinary infection, damage to nearby structures, need for further procedures, and cardiopulmonary complications from anesthesia.  Discussed expectations of stent and stent removal in 1 week in office.  The patient voiced understanding and wished to proceed.  Advised if symptoms worsen to come to [facility] ED.     Plan:  Fluid intake goal ~ 2.5L per day.  Hold ozempic x2 weeks prior to surgery  Schedule for left URS/LL with stent insertion    Surgical Concerns: BMI 40, GERD, Uncontrolled DM with A1C of 10% on ozempic, last dose today 08/30/24    Follow Up:   Return for Schedule for left URS/LL with stent.      ROSY Mathew  [facility]

## 2024-09-03 ENCOUNTER — TELEPHONE (OUTPATIENT)
Dept: UROLOGY | Facility: CLINIC | Age: 64
End: 2024-09-03
Payer: COMMERCIAL

## 2024-09-03 DIAGNOSIS — N20.0 KIDNEY STONES: Primary | ICD-10-CM

## 2024-09-03 RX ORDER — NITROFURANTOIN 25; 75 MG/1; MG/1
100 CAPSULE ORAL 2 TIMES DAILY
Qty: 14 CAPSULE | Refills: 0 | Status: SHIPPED | OUTPATIENT
Start: 2024-09-03

## 2024-09-03 NOTE — TELEPHONE ENCOUNTER
----- Message from Bryce Felton sent at 9/1/2024  8:41 AM EDT -----  Regarding: RE: left URS  1 week of antibiotics please and schedule her for next week 9/10 at [place]  ----- Message -----  From: Diane Stephens APRN  Sent: 8/30/2024   2:49 PM EDT  To: Kayy Triana; Bryce Felton MD  Subject: left URS                                         63 y.o. female presents today for 6.1 mm left renal stone, no hydronephrosis noted.  Patient was admitted to hospital on 08/22/24 with pyelonephritis and uncontrolled right flank pain.  Patient was treated with rocephin and IVF, urine culture negative and no imaging to support pyelonephritis.  Lactic was elevated at 2.2 and improved with treatment.  She is currently on cefdinir and has around 2 days left.  Patient reports intermittent left flank pain, bladder discomfort, and right flank/lower abdominal pain.  Still having some intermittent dysuria. No fever, chills, nausea, vomiting, hematuria.  UA today was not positive for nitrites or leukocytes.  She consented to left URS/LL with stent with surgical concerns of BMI 40, GERD, and uncontrolled DM with A1C of 10 on ozempic last dose today.      Do you want her to do any prophylactic ATB a week prior to surgery or no?  Thanks.

## 2024-09-04 ENCOUNTER — TELEPHONE (OUTPATIENT)
Dept: UROLOGY | Facility: CLINIC | Age: 64
End: 2024-09-04

## 2024-09-04 DIAGNOSIS — N30.00 ACUTE CYSTITIS WITHOUT HEMATURIA: Primary | ICD-10-CM

## 2024-09-04 LAB
QT INTERVAL: 336 MS
QTC INTERVAL: 448 MS

## 2024-09-04 NOTE — TELEPHONE ENCOUNTER
Caller: Silva Maldonado     Relationship: SELF    Best call back number: [phone]     Which medication are you concerned about: nitrofurantoin, macrocrystal-monohydrate, (Macrobid) 100 MG capsule     Who prescribed you this medication: JOSHUA ENCINAS    When did you start taking this medication: YESTERDAY AFTERNOON     What are your concerns: PT IS FEELING NAUSEOUS AND HAS A HEADACHE.    IF SHE IS SENT SOMETHING ELSE TO TAKE, SHE USES   [id number] [place], [place] - [address] AT [facility] [address] - [phone]  - [phone] FX     How long have you had these concerns: TODAY

## 2024-09-05 RX ORDER — SULFAMETHOXAZOLE/TRIMETHOPRIM 800-160 MG
1 TABLET ORAL 2 TIMES DAILY
Qty: 14 TABLET | Refills: 0 | Status: SHIPPED | OUTPATIENT
Start: 2024-09-05 | End: 2024-09-12

## 2024-09-05 NOTE — TELEPHONE ENCOUNTER
I have switched her antibiotic to bactrim she will take this 2 times a day leading up to her procedure.  Take with food.

## 2024-09-19 ENCOUNTER — PROCEDURE VISIT (OUTPATIENT)
Dept: UROLOGY | Facility: CLINIC | Age: 64
End: 2024-09-19
Payer: COMMERCIAL

## 2024-09-19 VITALS
DIASTOLIC BLOOD PRESSURE: 94 MMHG | SYSTOLIC BLOOD PRESSURE: 158 MMHG | WEIGHT: 229 LBS | HEART RATE: 85 BPM | TEMPERATURE: 97 F | HEIGHT: 63 IN | OXYGEN SATURATION: 98 % | BODY MASS INDEX: 40.57 KG/M2

## 2024-09-19 DIAGNOSIS — N20.0 KIDNEY STONES: Primary | ICD-10-CM

## 2024-10-18 ENCOUNTER — OFFICE VISIT (OUTPATIENT)
Dept: FAMILY MEDICINE CLINIC | Facility: CLINIC | Age: 64
End: 2024-10-18
Payer: COMMERCIAL

## 2024-10-18 VITALS
DIASTOLIC BLOOD PRESSURE: 78 MMHG | WEIGHT: 224 LBS | BODY MASS INDEX: 39.69 KG/M2 | OXYGEN SATURATION: 100 % | HEART RATE: 82 BPM | SYSTOLIC BLOOD PRESSURE: 128 MMHG | TEMPERATURE: 97.9 F | HEIGHT: 63 IN | RESPIRATION RATE: 16 BRPM

## 2024-10-18 DIAGNOSIS — I10 PRIMARY HYPERTENSION: ICD-10-CM

## 2024-10-18 DIAGNOSIS — E78.2 MIXED HYPERLIPIDEMIA: ICD-10-CM

## 2024-10-18 DIAGNOSIS — E11.65 TYPE 2 DIABETES MELLITUS WITH HYPERGLYCEMIA, WITHOUT LONG-TERM CURRENT USE OF INSULIN: Primary | ICD-10-CM

## 2024-10-18 LAB
EXPIRATION DATE: ABNORMAL
HBA1C MFR BLD: 9.4 % (ref 4.5–5.7)
Lab: ABNORMAL

## 2024-10-18 PROCEDURE — 99214 OFFICE O/P EST MOD 30 MIN: CPT | Performed by: NURSE PRACTITIONER

## 2024-10-18 PROCEDURE — 83036 HEMOGLOBIN GLYCOSYLATED A1C: CPT | Performed by: NURSE PRACTITIONER

## 2024-10-18 RX ORDER — SEMAGLUTIDE 2.68 MG/ML
2 INJECTION, SOLUTION SUBCUTANEOUS WEEKLY
Qty: 3 ML | Refills: 2 | Status: SHIPPED | OUTPATIENT
Start: 2024-10-18

## 2024-10-19 LAB
ALBUMIN SERPL-MCNC: 4.3 G/DL (ref 3.5–5.2)
ALBUMIN/GLOB SERPL: 1.7 G/DL
ALP SERPL-CCNC: 102 U/L (ref 39–117)
ALT SERPL-CCNC: 26 U/L (ref 1–33)
AST SERPL-CCNC: 23 U/L (ref 1–32)
BILIRUB SERPL-MCNC: 0.5 MG/DL (ref 0–1.2)
BUN SERPL-MCNC: 17 MG/DL (ref 8–23)
BUN/CREAT SERPL: 17.7 (ref 7–25)
CALCIUM SERPL-MCNC: 9.1 MG/DL (ref 8.6–10.5)
CHLORIDE SERPL-SCNC: 102 MMOL/L (ref 98–107)
CHOLEST SERPL-MCNC: 151 MG/DL (ref 0–200)
CO2 SERPL-SCNC: 23.2 MMOL/L (ref 22–29)
CREAT SERPL-MCNC: 0.96 MG/DL (ref 0.57–1)
EGFRCR SERPLBLD CKD-EPI 2021: 67.5 ML/MIN/1.73
GLOBULIN SER CALC-MCNC: 2.5 GM/DL
GLUCOSE SERPL-MCNC: 233 MG/DL (ref 65–99)
HDLC SERPL-MCNC: 44 MG/DL (ref 40–60)
LDLC SERPL CALC-MCNC: 83 MG/DL (ref 0–100)
POTASSIUM SERPL-SCNC: 4.8 MMOL/L (ref 3.5–5.2)
PROT SERPL-MCNC: 6.8 G/DL (ref 6–8.5)
SODIUM SERPL-SCNC: 136 MMOL/L (ref 136–145)
TRIGL SERPL-MCNC: 138 MG/DL (ref 0–150)
VLDLC SERPL CALC-MCNC: 24 MG/DL (ref 5–40)

## 2024-11-12 ENCOUNTER — OFFICE VISIT (OUTPATIENT)
Dept: FAMILY MEDICINE CLINIC | Facility: CLINIC | Age: 64
End: 2024-11-12
Payer: COMMERCIAL

## 2024-11-12 VITALS
OXYGEN SATURATION: 98 % | WEIGHT: 224 LBS | HEART RATE: 84 BPM | HEIGHT: 63 IN | RESPIRATION RATE: 16 BRPM | SYSTOLIC BLOOD PRESSURE: 126 MMHG | DIASTOLIC BLOOD PRESSURE: 84 MMHG | BODY MASS INDEX: 39.69 KG/M2

## 2024-11-12 DIAGNOSIS — R30.0 DYSURIA: ICD-10-CM

## 2024-11-12 DIAGNOSIS — N30.01 ACUTE CYSTITIS WITH HEMATURIA: Primary | ICD-10-CM

## 2024-11-12 LAB
BILIRUB BLD-MCNC: NEGATIVE MG/DL
CLARITY, POC: ABNORMAL
COLOR UR: YELLOW
GLUCOSE UR STRIP-MCNC: ABNORMAL MG/DL
KETONES UR QL: NEGATIVE
LEUKOCYTE EST, POC: NEGATIVE
NITRITE UR-MCNC: POSITIVE MG/ML
PH UR: 6 [PH] (ref 5–8)
PROT UR STRIP-MCNC: NEGATIVE MG/DL
RBC # UR STRIP: ABNORMAL /UL
SP GR UR: 1.01 (ref 1–1.03)
UROBILINOGEN UR QL: NORMAL

## 2024-11-12 PROCEDURE — 99213 OFFICE O/P EST LOW 20 MIN: CPT | Performed by: STUDENT IN AN ORGANIZED HEALTH CARE EDUCATION/TRAINING PROGRAM

## 2024-11-12 PROCEDURE — 81002 URINALYSIS NONAUTO W/O SCOPE: CPT | Performed by: STUDENT IN AN ORGANIZED HEALTH CARE EDUCATION/TRAINING PROGRAM

## 2024-11-12 RX ORDER — FLUCONAZOLE 150 MG/1
150 TABLET ORAL
Qty: 2 TABLET | Refills: 0 | Status: ON HOLD | OUTPATIENT
Start: 2024-11-12 | End: 2024-11-14

## 2024-11-12 RX ORDER — NITROFURANTOIN 25; 75 MG/1; MG/1
100 CAPSULE ORAL 2 TIMES DAILY
Qty: 10 CAPSULE | Refills: 0 | Status: SHIPPED | OUTPATIENT
Start: 2024-11-12 | End: 2024-11-15 | Stop reason: HOSPADM

## 2024-11-12 RX ORDER — PHENAZOPYRIDINE HYDROCHLORIDE 200 MG/1
200 TABLET, FILM COATED ORAL 3 TIMES DAILY PRN
Qty: 6 TABLET | Refills: 0 | Status: ON HOLD | OUTPATIENT
Start: 2024-11-12 | End: 2024-11-15

## 2024-11-12 NOTE — PROGRESS NOTES
Same Day Office Visit      Date: 2024   Patient Name: Silva Maldonado  : 1960   MRN: [id number]     Chief Complaint:    Chief Complaint   Patient presents with    Difficulty Urinating     Symptoms started Friday, discolored urine    Back Pain       History of Present Illness: Silva Maldonado is a 64 y.o. female who is here today for symptoms of dysuria. Patient reports symptoms of frequent urination and burning with urination. Patient does not report back pain to me in office today. Patient reports symptoms began roughly on Saturday at her daughters wedding.      Subjective     I have reviewed the patients family history, social history, past medical history, past surgical history and have updated it as appropriate.     Medications:     Current Outpatient Medications:     Blood Glucose Monitoring Suppl kit, 1 each 4 (Four) Times a Day., Disp: 1 each, Rfl: 0    glucose blood (Glucose Meter Test) test strip, Use as instructed, Disp: 200 each, Rfl: 12    Jardiance 10 MG tablet tablet, TAKE 1 TABLET BY MOUTH DAILY, Disp: 30 tablet, Rfl: 2    losartan (COZAAR) 25 MG tablet, Take 1 tablet by mouth Daily., Disp: 30 tablet, Rfl: 2    Magnesium 250 MG tablet, Take 1 tablet by mouth At Night As Needed., Disp: , Rfl:     Semaglutide, 2 MG/DOSE, (Ozempic, 2 MG/DOSE,) 8 MG/3ML solution pen-injector, Inject 2 mg under the skin into the appropriate area as directed 1 (One) Time Per Week., Disp: 3 mL, Rfl: 2    simvastatin (ZOCOR) 20 MG tablet, Take 1 tablet by mouth Every Evening., Disp: 30 tablet, Rfl: 3    fluconazole (Diflucan) 150 MG tablet, Take 1 tablet by mouth Every 72 (Seventy-Two) Hours for 2 doses., Disp: 2 tablet, Rfl: 0    nitrofurantoin, macrocrystal-monohydrate, (Macrobid) 100 MG capsule, Take 1 capsule by mouth 2 (Two) Times a Day for 5 days., Disp: 10 capsule, Rfl: 0    phenazopyridine (Pyridium) 200 MG tablet, Take 1 tablet by mouth 3 (Three) Times a Day As Needed for Bladder  "Spasms for up to 2 days., Disp: 6 tablet, Rfl: 0    Allergies:   No Known Allergies    Objective     Physical Exam:     Vital Signs:   Vitals:    11/12/24 1601   BP: 126/84   Pulse: 84   Resp: 16   SpO2: 98%   Weight: 102 kg (224 lb)   Height: 160 cm (63\")     Body mass index is 39.68 kg/m².          Physical Exam     General:  Well appearing adult female in no acute distress. Alert and oriented. Vitals reviewed and are within normal limits.  Head/ENT: Atraumatic.   Neck: Anatomy appears symmetrical.   Cardiac: Patient appears well perfused with regular rate.  Pulmonary: No signs of respiratory distress.  Integumentary/Skin: Skin appears unremarkable from observable skin surfaces.   Neurological: Normal gait and speech.  Behavioral/Psych: Patient behavior/demeanor appears consistent with reported age. Patient is pleasant with normal affect today.      Procedures      Assessment / Plan      1. Acute cystitis with hematuria  Patient developed symptoms of dysuria including frequent urination and burning with urination. Patient is hemodynamically stable in office today and non-toxic appearing. Urine dip shows findings consistent with urinary tract infection. Will prescribe 5 day course of Macrobid and provide Pyridium. Will send urine for culture. Have provided Diflucan for possible development of vaginal candidiasis. Patient may follow up with regular PCP at upcoming follow up or sooner if symptoms do not improve or worsen.  - nitrofurantoin, macrocrystal-monohydrate, (Macrobid) 100 MG capsule; Take 1 capsule by mouth 2 (Two) Times a Day for 5 days.  Dispense: 10 capsule; Refill: 0  - phenazopyridine (Pyridium) 200 MG tablet; Take 1 tablet by mouth 3 (Three) Times a Day As Needed for Bladder Spasms for up to 2 days.  Dispense: 6 tablet; Refill: 0  - fluconazole (Diflucan) 150 MG tablet; Take 1 tablet by mouth Every 72 (Seventy-Two) Hours for 2 doses.  Dispense: 2 tablet; Refill: 0    2. Dysuria  See UTI.  - POC " Urinalysis Dipstick  - Urine Culture - Urine, Urine, Clean Catch       Follow Up:   No follow-ups on file.      MD PADMINI [facility]  [address] [place] [address] [address] [place] [address]  Fax [phone]  Phone [phone]

## 2024-11-14 ENCOUNTER — APPOINTMENT (OUTPATIENT)
Dept: CT IMAGING | Facility: HOSPITAL | Age: 64
End: 2024-11-14
Payer: COMMERCIAL

## 2024-11-14 ENCOUNTER — HOSPITAL ENCOUNTER (OUTPATIENT)
Facility: HOSPITAL | Age: 64
Setting detail: OBSERVATION
Discharge: HOME | End: 2024-11-15
Attending: STUDENT IN AN ORGANIZED HEALTH CARE EDUCATION/TRAINING PROGRAM | Admitting: INTERNAL MEDICINE
Payer: COMMERCIAL

## 2024-11-14 ENCOUNTER — APPOINTMENT (OUTPATIENT)
Dept: GENERAL RADIOLOGY | Facility: HOSPITAL | Age: 64
End: 2024-11-14
Payer: COMMERCIAL

## 2024-11-14 DIAGNOSIS — N30.01 ACUTE CYSTITIS WITH HEMATURIA: ICD-10-CM

## 2024-11-14 DIAGNOSIS — N30.00 ACUTE CYSTITIS WITHOUT HEMATURIA: Primary | ICD-10-CM

## 2024-11-14 PROBLEM — N39.0 UTI (URINARY TRACT INFECTION): Status: ACTIVE | Noted: 2024-11-14

## 2024-11-14 LAB
ALBUMIN SERPL-MCNC: 4 G/DL (ref 3.5–5.2)
ALBUMIN/GLOB SERPL: 1.5 G/DL
ALP SERPL-CCNC: 95 U/L (ref 39–117)
ALT SERPL W P-5'-P-CCNC: 109 U/L (ref 1–33)
ANION GAP SERPL CALCULATED.3IONS-SCNC: 12 MMOL/L (ref 5–15)
AST SERPL-CCNC: 147 U/L (ref 1–32)
BACTERIA UR QL AUTO: ABNORMAL /HPF
BASOPHILS # BLD AUTO: 0.03 10*3/MM3 (ref 0–0.2)
BASOPHILS NFR BLD AUTO: 0.4 % (ref 0–1.5)
BILIRUB SERPL-MCNC: 0.8 MG/DL (ref 0–1.2)
BILIRUB UR QL STRIP: ABNORMAL
BUN SERPL-MCNC: 18 MG/DL (ref 8–23)
BUN/CREAT SERPL: 17.6 (ref 7–25)
CALCIUM SPEC-SCNC: 8.9 MG/DL (ref 8.6–10.5)
CHLORIDE SERPL-SCNC: 101 MMOL/L (ref 98–107)
CLARITY UR: ABNORMAL
CO2 SERPL-SCNC: 22 MMOL/L (ref 22–29)
COLOR UR: YELLOW
CREAT SERPL-MCNC: 1.02 MG/DL (ref 0.57–1)
CRP SERPL-MCNC: 11.53 MG/DL (ref 0–0.5)
D-LACTATE SERPL-SCNC: 1.6 MMOL/L (ref 0.5–2)
D-LACTATE SERPL-SCNC: 2.5 MMOL/L (ref 0.5–2)
DEPRECATED RDW RBC AUTO: 37 FL (ref 37–54)
EGFRCR SERPLBLD CKD-EPI 2021: 62.3 ML/MIN/1.73
EOSINOPHIL # BLD AUTO: 0.17 10*3/MM3 (ref 0–0.4)
EOSINOPHIL NFR BLD AUTO: 2.3 % (ref 0.3–6.2)
ERYTHROCYTE [DISTWIDTH] IN BLOOD BY AUTOMATED COUNT: 13 % (ref 12.3–15.4)
FLUAV SUBTYP SPEC NAA+PROBE: NOT DETECTED
FLUBV RNA ISLT QL NAA+PROBE: NOT DETECTED
GEN 5 2HR TROPONIN T REFLEX: 21 NG/L
GLOBULIN UR ELPH-MCNC: 2.6 GM/DL
GLUCOSE SERPL-MCNC: 269 MG/DL (ref 65–99)
GLUCOSE UR STRIP-MCNC: ABNORMAL MG/DL
HCT VFR BLD AUTO: 40.4 % (ref 34–46.6)
HGB BLD-MCNC: 13.7 G/DL (ref 12–15.9)
HGB UR QL STRIP.AUTO: ABNORMAL
HYALINE CASTS UR QL AUTO: ABNORMAL /LPF
IMM GRANULOCYTES # BLD AUTO: 0.06 10*3/MM3 (ref 0–0.05)
IMM GRANULOCYTES NFR BLD AUTO: 0.8 % (ref 0–0.5)
KETONES UR QL STRIP: ABNORMAL
LEUKOCYTE ESTERASE UR QL STRIP.AUTO: ABNORMAL
LYMPHOCYTES # BLD AUTO: 0.19 10*3/MM3 (ref 0.7–3.1)
LYMPHOCYTES NFR BLD AUTO: 2.5 % (ref 19.6–45.3)
MAGNESIUM SERPL-MCNC: 2 MG/DL (ref 1.6–2.4)
MCH RBC QN AUTO: 26.8 PG (ref 26.6–33)
MCHC RBC AUTO-ENTMCNC: 33.9 G/DL (ref 31.5–35.7)
MCV RBC AUTO: 78.9 FL (ref 79–97)
MONOCYTES # BLD AUTO: 0.1 10*3/MM3 (ref 0.1–0.9)
MONOCYTES NFR BLD AUTO: 1.3 % (ref 5–12)
NEUTROPHILS NFR BLD AUTO: 6.91 10*3/MM3 (ref 1.7–7)
NEUTROPHILS NFR BLD AUTO: 92.7 % (ref 42.7–76)
NITRITE UR QL STRIP: NEGATIVE
NRBC BLD AUTO-RTO: 0 /100 WBC (ref 0–0.2)
NT-PROBNP SERPL-MCNC: 298 PG/ML (ref 0–900)
PH UR STRIP.AUTO: 6 [PH] (ref 5–8)
PLATELET # BLD AUTO: 224 10*3/MM3 (ref 140–450)
PMV BLD AUTO: 10 FL (ref 6–12)
POTASSIUM SERPL-SCNC: 3.9 MMOL/L (ref 3.5–5.2)
PROCALCITONIN SERPL-MCNC: 3.7 NG/ML (ref 0–0.25)
PROT SERPL-MCNC: 6.6 G/DL (ref 6–8.5)
PROT UR QL STRIP: ABNORMAL
RBC # BLD AUTO: 5.12 10*6/MM3 (ref 3.77–5.28)
RBC # UR STRIP: ABNORMAL /HPF
REF LAB TEST METHOD: ABNORMAL
SARS-COV-2 RNA RESP QL NAA+PROBE: NOT DETECTED
SODIUM SERPL-SCNC: 135 MMOL/L (ref 136–145)
SP GR UR STRIP: 1.02 (ref 1–1.03)
SQUAMOUS #/AREA URNS HPF: ABNORMAL /HPF
TROPONIN T DELTA: 2 NG/L
TROPONIN T SERPL HS-MCNC: 19 NG/L
UROBILINOGEN UR QL STRIP: ABNORMAL
WBC # UR STRIP: ABNORMAL /HPF
WBC NRBC COR # BLD AUTO: 7.46 10*3/MM3 (ref 3.4–10.8)
YEAST URNS QL MICRO: ABNORMAL /HPF

## 2024-11-14 PROCEDURE — 25810000003 SODIUM CHLORIDE 0.9 % SOLUTION: Performed by: STUDENT IN AN ORGANIZED HEALTH CARE EDUCATION/TRAINING PROGRAM

## 2024-11-14 PROCEDURE — 96365 THER/PROPH/DIAG IV INF INIT: CPT

## 2024-11-14 PROCEDURE — 85025 COMPLETE CBC W/AUTO DIFF WBC: CPT | Performed by: STUDENT IN AN ORGANIZED HEALTH CARE EDUCATION/TRAINING PROGRAM

## 2024-11-14 PROCEDURE — 93005 ELECTROCARDIOGRAM TRACING: CPT | Performed by: STUDENT IN AN ORGANIZED HEALTH CARE EDUCATION/TRAINING PROGRAM

## 2024-11-14 PROCEDURE — G0378 HOSPITAL OBSERVATION PER HR: HCPCS

## 2024-11-14 PROCEDURE — 83605 ASSAY OF LACTIC ACID: CPT | Performed by: STUDENT IN AN ORGANIZED HEALTH CARE EDUCATION/TRAINING PROGRAM

## 2024-11-14 PROCEDURE — 83880 ASSAY OF NATRIURETIC PEPTIDE: CPT | Performed by: STUDENT IN AN ORGANIZED HEALTH CARE EDUCATION/TRAINING PROGRAM

## 2024-11-14 PROCEDURE — 71045 X-RAY EXAM CHEST 1 VIEW: CPT

## 2024-11-14 PROCEDURE — 84484 ASSAY OF TROPONIN QUANT: CPT | Performed by: STUDENT IN AN ORGANIZED HEALTH CARE EDUCATION/TRAINING PROGRAM

## 2024-11-14 PROCEDURE — 99223 1ST HOSP IP/OBS HIGH 75: CPT | Performed by: STUDENT IN AN ORGANIZED HEALTH CARE EDUCATION/TRAINING PROGRAM

## 2024-11-14 PROCEDURE — 83735 ASSAY OF MAGNESIUM: CPT | Performed by: STUDENT IN AN ORGANIZED HEALTH CARE EDUCATION/TRAINING PROGRAM

## 2024-11-14 PROCEDURE — 81001 URINALYSIS AUTO W/SCOPE: CPT | Performed by: STUDENT IN AN ORGANIZED HEALTH CARE EDUCATION/TRAINING PROGRAM

## 2024-11-14 PROCEDURE — 74176 CT ABD & PELVIS W/O CONTRAST: CPT

## 2024-11-14 PROCEDURE — 87147 CULTURE TYPE IMMUNOLOGIC: CPT | Performed by: STUDENT IN AN ORGANIZED HEALTH CARE EDUCATION/TRAINING PROGRAM

## 2024-11-14 PROCEDURE — 86140 C-REACTIVE PROTEIN: CPT | Performed by: STUDENT IN AN ORGANIZED HEALTH CARE EDUCATION/TRAINING PROGRAM

## 2024-11-14 PROCEDURE — 87154 CUL TYP ID BLD PTHGN 6+ TRGT: CPT | Performed by: STUDENT IN AN ORGANIZED HEALTH CARE EDUCATION/TRAINING PROGRAM

## 2024-11-14 PROCEDURE — 99285 EMERGENCY DEPT VISIT HI MDM: CPT

## 2024-11-14 PROCEDURE — 25010000002 DROPERIDOL PER 5 MG: Performed by: STUDENT IN AN ORGANIZED HEALTH CARE EDUCATION/TRAINING PROGRAM

## 2024-11-14 PROCEDURE — 25010000002 LIDOCAINE 1 % SOLUTION: Performed by: STUDENT IN AN ORGANIZED HEALTH CARE EDUCATION/TRAINING PROGRAM

## 2024-11-14 PROCEDURE — 87186 SC STD MICRODIL/AGAR DIL: CPT | Performed by: STUDENT IN AN ORGANIZED HEALTH CARE EDUCATION/TRAINING PROGRAM

## 2024-11-14 PROCEDURE — 25010000002 DIPHENHYDRAMINE PER 50 MG: Performed by: STUDENT IN AN ORGANIZED HEALTH CARE EDUCATION/TRAINING PROGRAM

## 2024-11-14 PROCEDURE — 87040 BLOOD CULTURE FOR BACTERIA: CPT | Performed by: STUDENT IN AN ORGANIZED HEALTH CARE EDUCATION/TRAINING PROGRAM

## 2024-11-14 PROCEDURE — 25010000002 ONDANSETRON PER 1 MG: Performed by: STUDENT IN AN ORGANIZED HEALTH CARE EDUCATION/TRAINING PROGRAM

## 2024-11-14 PROCEDURE — 87636 SARSCOV2 & INF A&B AMP PRB: CPT | Performed by: STUDENT IN AN ORGANIZED HEALTH CARE EDUCATION/TRAINING PROGRAM

## 2024-11-14 PROCEDURE — 80053 COMPREHEN METABOLIC PANEL: CPT | Performed by: STUDENT IN AN ORGANIZED HEALTH CARE EDUCATION/TRAINING PROGRAM

## 2024-11-14 PROCEDURE — 25010000002 CEFTRIAXONE PER 250 MG: Performed by: STUDENT IN AN ORGANIZED HEALTH CARE EDUCATION/TRAINING PROGRAM

## 2024-11-14 PROCEDURE — 87086 URINE CULTURE/COLONY COUNT: CPT | Performed by: STUDENT IN AN ORGANIZED HEALTH CARE EDUCATION/TRAINING PROGRAM

## 2024-11-14 PROCEDURE — 96375 TX/PRO/DX INJ NEW DRUG ADDON: CPT

## 2024-11-14 PROCEDURE — 84145 PROCALCITONIN (PCT): CPT | Performed by: STUDENT IN AN ORGANIZED HEALTH CARE EDUCATION/TRAINING PROGRAM

## 2024-11-14 PROCEDURE — 99291 CRITICAL CARE FIRST HOUR: CPT | Performed by: STUDENT IN AN ORGANIZED HEALTH CARE EDUCATION/TRAINING PROGRAM

## 2024-11-14 RX ORDER — NITROGLYCERIN 0.4 MG/1
0.4 TABLET SUBLINGUAL
Status: DISCONTINUED | OUTPATIENT
Start: 2024-11-14 | End: 2024-11-15 | Stop reason: HOSPADM

## 2024-11-14 RX ORDER — LIDOCAINE HYDROCHLORIDE 10 MG/ML
10 INJECTION, SOLUTION INFILTRATION; PERINEURAL ONCE
Status: COMPLETED | OUTPATIENT
Start: 2024-11-14 | End: 2024-11-14

## 2024-11-14 RX ORDER — ACETAMINOPHEN 650 MG/1
650 SUPPOSITORY RECTAL EVERY 4 HOURS PRN
Status: DISCONTINUED | OUTPATIENT
Start: 2024-11-14 | End: 2024-11-15 | Stop reason: HOSPADM

## 2024-11-14 RX ORDER — ONDANSETRON 2 MG/ML
4 INJECTION INTRAMUSCULAR; INTRAVENOUS ONCE
Status: COMPLETED | OUTPATIENT
Start: 2024-11-14 | End: 2024-11-14

## 2024-11-14 RX ORDER — SODIUM CHLORIDE 0.9 % (FLUSH) 0.9 %
10 SYRINGE (ML) INJECTION EVERY 12 HOURS SCHEDULED
Status: DISCONTINUED | OUTPATIENT
Start: 2024-11-14 | End: 2024-11-15 | Stop reason: HOSPADM

## 2024-11-14 RX ORDER — DROPERIDOL 2.5 MG/ML
2.5 INJECTION, SOLUTION INTRAMUSCULAR; INTRAVENOUS ONCE
Status: COMPLETED | OUTPATIENT
Start: 2024-11-14 | End: 2024-11-14

## 2024-11-14 RX ORDER — ACETAMINOPHEN 160 MG/5ML
650 SOLUTION ORAL EVERY 4 HOURS PRN
Status: DISCONTINUED | OUTPATIENT
Start: 2024-11-14 | End: 2024-11-15 | Stop reason: HOSPADM

## 2024-11-14 RX ORDER — SODIUM CHLORIDE 0.9 % (FLUSH) 0.9 %
10 SYRINGE (ML) INJECTION AS NEEDED
Status: DISCONTINUED | OUTPATIENT
Start: 2024-11-14 | End: 2024-11-15 | Stop reason: HOSPADM

## 2024-11-14 RX ORDER — DIPHENHYDRAMINE HYDROCHLORIDE 50 MG/ML
25 INJECTION INTRAMUSCULAR; INTRAVENOUS ONCE
Status: COMPLETED | OUTPATIENT
Start: 2024-11-14 | End: 2024-11-14

## 2024-11-14 RX ORDER — SODIUM CHLORIDE 9 MG/ML
40 INJECTION, SOLUTION INTRAVENOUS AS NEEDED
Status: DISCONTINUED | OUTPATIENT
Start: 2024-11-14 | End: 2024-11-15 | Stop reason: HOSPADM

## 2024-11-14 RX ORDER — TRAMADOL HYDROCHLORIDE 50 MG/1
50 TABLET ORAL EVERY 6 HOURS PRN
Status: DISCONTINUED | OUTPATIENT
Start: 2024-11-14 | End: 2024-11-15 | Stop reason: HOSPADM

## 2024-11-14 RX ORDER — ONDANSETRON 2 MG/ML
4 INJECTION INTRAMUSCULAR; INTRAVENOUS EVERY 6 HOURS PRN
Status: DISCONTINUED | OUTPATIENT
Start: 2024-11-14 | End: 2024-11-15 | Stop reason: HOSPADM

## 2024-11-14 RX ORDER — ACETAMINOPHEN 325 MG/1
650 TABLET ORAL EVERY 4 HOURS PRN
Status: DISCONTINUED | OUTPATIENT
Start: 2024-11-14 | End: 2024-11-15 | Stop reason: HOSPADM

## 2024-11-14 RX ADMIN — ONDANSETRON 4 MG: 2 INJECTION INTRAMUSCULAR; INTRAVENOUS at 09:22

## 2024-11-14 RX ADMIN — ACETAMINOPHEN 650 MG: 325 TABLET, FILM COATED ORAL at 15:16

## 2024-11-14 RX ADMIN — LIDOCAINE HYDROCHLORIDE 10 ML: 10 INJECTION, SOLUTION INFILTRATION; PERINEURAL at 08:50

## 2024-11-14 RX ADMIN — TRAMADOL HYDROCHLORIDE 50 MG: 50 TABLET ORAL at 21:33

## 2024-11-14 RX ADMIN — DROPERIDOL 2.5 MG: 2.5 INJECTION, SOLUTION INTRAMUSCULAR; INTRAVENOUS at 09:41

## 2024-11-14 RX ADMIN — Medication 10 ML: at 20:49

## 2024-11-14 RX ADMIN — DIPHENHYDRAMINE HYDROCHLORIDE 25 MG: 50 INJECTION, SOLUTION INTRAMUSCULAR; INTRAVENOUS at 09:39

## 2024-11-14 RX ADMIN — SODIUM CHLORIDE 2000 MG: 9 INJECTION, SOLUTION INTRAVENOUS at 11:20

## 2024-11-14 RX ADMIN — ACETAMINOPHEN 650 MG: 325 TABLET, FILM COATED ORAL at 19:41

## 2024-11-14 RX ADMIN — SODIUM CHLORIDE 1000 ML: 9 INJECTION, SOLUTION INTRAVENOUS at 12:57

## 2024-11-14 NOTE — ED NOTES
Att I called the RN in charge of this pt because of the VFIB alarm. RN was at the bedside and stated pt was just shaking and the monitor was interpreting the movement of the PT

## 2024-11-14 NOTE — Clinical Note
Level of Care: Telemetry [5]   Diagnosis: UTI (urinary tract infection) [210403]   Certification: I Certify That Inpatient Hospital Services Are Medically Necessary For Greater Than 2 Midnights

## 2024-11-14 NOTE — PAYOR COMM NOTE
"TO:[facility]  FROM:ZULEYKA CHUNG,RN PHONE [phone]  OBSERVATION NOTIFICATION  TAX ID [id number] NPI [id number]    Silva Moreau (64 y.o. Female)       Date of Birth   1960    Social Security Number       Address   [place] [facility] [address]    Home Phone   [phone]    MRN   [id number]       Rastafarian   [facility]    Marital Status                               Admission Date   24    Admission Type   Emergency    Admitting Provider   Matias Gutierrez DO    Attending Provider   Kerley, Brian Joseph, DO    Department, Room/Bed   [facility] TELEMETRY 4, 429/1       Discharge Date       Discharge Disposition       Discharge Destination                                 Attending Provider: Kerley, Brian Joseph, DO    Allergies: No Known Allergies    Isolation: None   Infection: None   Code Status: CPR    Ht: 160 cm (63\")   Wt: 102 kg (224 lb 13.9 oz)    Admission Cmt: None   Principal Problem: UTI (urinary tract infection) [N39.0]                   Active Insurance as of 2024       Primary Coverage       Payor Plan Insurance Group Employer/Plan Group    [facility] [id number]       Payor Plan Address Payor Plan Phone Number Payor Plan Fax Number Effective Dates    [address]   2022 - None Entered    [place] [address]         Subscriber Name Subscriber Birth Date Member ID       GABRIELLE MOREAU 10/24/1971 [id number]                     Emergency Contacts        (Rel.) Home Phone Work Phone Mobile Phone    KSENIA MOREAU (Spouse) [phone] -- [phone]                 History & Physical        Kerley, Brian Joseph, DO at 24 1403            [facility] HOSPITALIST   HISTORY AND PHYSICAL      Name:  Silva Moreau   Age:  64 y.o.  Sex:  female  :  1960  MRN:  [id number]   Visit Number:  [id number]  Admission Date:  2024  Date Of Service:  24  Primary Care " Physician:  Sharmila Oconnell APRN    Chief Complaint:     Finger swelling, dysuria    History Of Presenting Illness:      Silva Maldonado is a 64-year-old woman with past medical history of type 2 diabetes, diabetic nephropathy, polyarthritis, kidney stones, cholecystectomy, diverticulosis, hypertension, hyperlipidemia.  Presented to [facility] ED on 11/14/2024 with concern for area of fluctuance on her right index finger more swollen for 2 days.  Also has had some dysuria.  Did report chest pain day prior to presentation which have resolved.  Did have some chills.  Denied nausea and vomiting, leg pain or swelling, history of PE/DVT.    ED summary: Afebrile, tachycardic which improved, other vital signs stable on room air.  EKG sinus rhythm, nonspecific ST-T wave changes.  High sensitive troponin 19, 21, ACS not suspected.  Blood glucose 269.  CRP 11.53, lactate 2.5, procalcitonin 3.7.  No leukocytosis.  No anemia.  Pattern of infection on urinalysis.  Blood cultures.  Urine culture.  COVID/flu negative.  CXR no acute cardiopulmonary process.  CT ab/pelvis no hydronephrosis or nephrolithiasis, does show enlarged fatty infiltrated liver with splenomegaly but normal portal vein, does show diverticulosis, stable precaval lymph node.  She was provided Rocephin, Benadryl, droperidol, Zofran, 1 L NS bolus.    Review Of Systems:    All systems were reviewed and negative except as mentioned in history of presenting illness, assessment and plan.    Past Medical History: Patient  has a past medical history of Cholelithiasis, Colon polyp (12/2019), Diabetes mellitus (2017), Diverticulosis (2007), Elevated cholesterol, Gestational hypertension (9/1999), Hypertension, Kidney stone (8/20/24), Multiple gestation (1999), Obesity, Ovarian cyst (1999), Pelvic prolapse (2001), Preeclampsia (1999), UTI (urinary tract infection) (11/14/2024), and Varicella (1968).    Past Surgical History: Patient  has a past surgical history that includes  Cholecystectomy (1998); Tonsillectomy (1983); Colonoscopy (12/27/2019); Knee arthroscopy (Left); Colonoscopy (N/A, 05/04/2023); Vaginal prolapse repair (2006); Laparoscopic cholecystectomy (1998); and Tubal ligation (2006).    Social History: Patient  reports that she quit smoking about 12 years ago. Her smoking use included cigarettes. She started smoking about 38 years ago. She has a 38.4 pack-year smoking history. She has been exposed to tobacco smoke. She has never used smokeless tobacco. She reports that she does not drink alcohol and does not use drugs.    Family History:  Patient's family history has been reviewed and found to be noncontributory.     Allergies:      Patient has no known allergies.    Home Medications:    Prior to Admission Medications       Prescriptions Last Dose Informant Patient Reported? Taking?    Blood Glucose Monitoring Suppl kit   No No    1 each 4 (Four) Times a Day.    fluconazole (Diflucan) 150 MG tablet   No No    Take 1 tablet by mouth Every 72 (Seventy-Two) Hours for 2 doses.    glucose blood (Glucose Meter Test) test strip   No No    Use as instructed    Jardiance 10 MG tablet tablet   No No    TAKE 1 TABLET BY MOUTH DAILY    losartan (COZAAR) 25 MG tablet   No No    Take 1 tablet by mouth Daily.    Magnesium 250 MG tablet   Yes No    Take 1 tablet by mouth At Night As Needed.    nitrofurantoin, macrocrystal-monohydrate, (Macrobid) 100 MG capsule   No No    Take 1 capsule by mouth 2 (Two) Times a Day for 5 days.    phenazopyridine (Pyridium) 200 MG tablet   No No    Take 1 tablet by mouth 3 (Three) Times a Day As Needed for Bladder Spasms for up to 2 days.    Semaglutide, 2 MG/DOSE, (Ozempic, 2 MG/DOSE,) 8 MG/3ML solution pen-injector   No No    Inject 2 mg under the skin into the appropriate area as directed 1 (One) Time Per Week.    simvastatin (ZOCOR) 20 MG tablet   No No    Take 1 tablet by mouth Every Evening.          ED Medications:    Medications   lidocaine (XYLOCAINE)  "1 % injection 10 mL (10 mL Injection Given by Other 11/14/24 0832)   ondansetron (ZOFRAN) injection 4 mg (4 mg Intravenous Given 11/14/24 0922)   droperidol (INAPSINE) injection 2.5 mg (2.5 mg Intravenous Given 11/14/24 0941)   diphenhydrAMINE (BENADRYL) injection 25 mg (25 mg Intravenous Given 11/14/24 0939)   cefTRIAXone (ROCEPHIN) 2,000 mg in sodium chloride 0.9 % 100 mL IVPB-VTB (0 mg Intravenous Stopped 11/14/24 1241)   sodium chloride 0.9 % bolus 1,000 mL (1,000 mL Intravenous New Bag 11/14/24 1257)     Vital Signs:  Temp:  [97.8 °F (36.6 °C)] 97.8 °F (36.6 °C)  Heart Rate:  [] 104  Resp:  [18] 18  BP: (125-155)/(67-88) 155/77        11/14/24  0759   Weight: 102 kg (224 lb)     Body mass index is 39.68 kg/m².    Physical Exam:     Most recent vital Signs: /77   Pulse 104   Temp 97.8 °F (36.6 °C) (Oral)   Resp 18   Ht 160 cm (63\")   Wt 102 kg (224 lb)   SpO2 92%   BMI 39.68 kg/m²     Physical Exam  Constitutional:       General: She is not in acute distress.     Appearance: She is obese. She is ill-appearing. She is not toxic-appearing.   HENT:      Mouth/Throat:      Mouth: Mucous membranes are moist.   Eyes:      Extraocular Movements: Extraocular movements intact.   Cardiovascular:      Rate and Rhythm: Normal rate and regular rhythm.      Pulses: Normal pulses.      Heart sounds: Normal heart sounds.   Pulmonary:      Effort: Pulmonary effort is normal.      Breath sounds: Normal breath sounds.   Abdominal:      Palpations: Abdomen is soft.      Tenderness: There is no abdominal tenderness.   Musculoskeletal:      Right lower leg: No edema.      Left lower leg: No edema.      Comments: Distal phalanx right index finger bandaged   Skin:     General: Skin is warm.   Neurological:      General: No focal deficit present.      Mental Status: She is alert and oriented to person, place, and time.   Psychiatric:         Mood and Affect: Mood normal.         Thought Content: Thought content " normal.         Laboratory data:    I have reviewed the labs done in the emergency room.    Results from last 7 days   Lab Units 11/14/24  0820   SODIUM mmol/L 135*   POTASSIUM mmol/L 3.9   CHLORIDE mmol/L 101   CO2 mmol/L 22.0   BUN mg/dL 18   CREATININE mg/dL 1.02*   CALCIUM mg/dL 8.9   BILIRUBIN mg/dL 0.8   ALK PHOS U/L 95   ALT (SGPT) U/L 109*   AST (SGOT) U/L 147*   GLUCOSE mg/dL 269*     Results from last 7 days   Lab Units 11/14/24  0820   WBC 10*3/mm3 7.46   HEMOGLOBIN g/dL 13.7   HEMATOCRIT % 40.4   PLATELETS 10*3/mm3 224         Results from last 7 days   Lab Units 11/14/24  1027 11/14/24  0820   HSTROP T ng/L 21* 19*     Results from last 7 days   Lab Units 11/14/24  0820   PROBNP pg/mL 298.0                 Results from last 7 days   Lab Units 11/14/24  0818 11/12/24  1613   COLOR UA  Yellow Yellow   SPECIFIC GRAVITY, URINE   --  1.015   GLUCOSE UA  >=1000 mg/dL (3+)*  --    KETONES UA  15 mg/dL (1+)* Negative   BLOOD UA  Moderate (2+)*  --    LEUKOCYTES UA  Small (1+)* Negative   PH, URINE  6.0 6.0   BILIRUBIN UA  Small (1+)* Negative   UROBILINOGEN UA  1.0 E.U./dL Normal   RBC UA /HPF 0-2  --    WBC UA /HPF Too Numerous to Count*  --        Pain Management Panel          Latest Ref Rng & Units 4/12/2024   Pain Management Panel   Creatinine, Urine Not Estab. mg/dL 136.2       Details                   EKG:      EKG sinus rhythm, nonspecific ST-T wave changes.    Radiology:    CT Abdomen Pelvis Without Contrast    Result Date: 11/14/2024  PROCEDURE: CT ABDOMEN PELVIS WO CONTRAST-  HISTORY: Eval nephrolithiasis  COMPARISON: August 21, 2024..  PROCEDURE: Axial images were obtained from the lung bases to the pubic symphysis by computed tomography. This study was performed with techniques to keep radiation doses as low as reasonably achievable, (ALARA). Individualized dose reduction techniques using automated exposure control or adjustment of mA and/or kV according to the patient size were employed.   FINDINGS:  ABDOMEN: The lung bases are clear. The heart is proper size. The limited non-contrast images of the liver demonstrate diffuse fatty infiltration without focal mass. Liver is enlarged at 20.8 cm. Portal vein is normal in diameter. There are metallic surgical clips in the right upper quadrant consistent with previous cholecystectomy. The spleen is mildly enlarged at 13 cm. Findings are similar to prior exam. No adrenal masses are seen. The aorta is normal in caliber. No free fluid identified. There is a prominent 2.5 cm precaval lymph node which is stable from the prior exam. Vascular calcifications noted. There is no nephrolithiasis. There is no hydronephrosis. Bilateral perirenal stranding again noted. Small umbilical hernia containing fat noted. Uterus is midline and to the left.  PELVIS: The GI tract demonstrate no obstruction. Cecum is located anteriorly in the right lower quadrant. There is diverticulosis without evidence of diverticulitis. The appendix is not identified; no secondary signs of appendicitis seen. The urinary bladder is unremarkable. There is no fluid or adenopathy.      No hydronephrosis or nephrolithiasis.  Enlarged, fatty infiltrated liver with mild splenomegaly but normal portal vein; findings similar to prior.  Diverticulosis.  Stable precaval lymph node from prior exam.   CTDI: 12.29 mGy DLP:591.12 mGy.cm  This report was signed and finalized on 11/14/2024 11:57 AM by Marycruz Maldonado MD.      XR Chest 1 View    Result Date: 11/14/2024  PROCEDURE: XR CHEST 1 VW-  HISTORY: Chest pain, mid chest pain that goes into shoulders and of the neck for 1 day.  COMPARISON: August 21, 2024..  FINDINGS: The heart is upper limits of normal, stable.. The lungs are clear. The mediastinum is unremarkable. There is no pneumothorax.  There are no acute osseous abnormalities. Apical lordotic positioning noted.      No acute cardiopulmonary process.     This report was signed and finalized on 11/14/2024  "8:38 AM by Marycruz Maldonado MD.       Assessment/Plan:    Inpatient general floor admission 11/14/2024 with UTI, right finger superficial abscess status post I&D by ED physician, transient encephalopathy.    UTI  Rocephin.  Urine culture.  Encephalopathy, resolved  May be related to UTI, although  at bedside said that it was only transient after she got the droperidol so it may have been related to the medicine.  Right index finger infection  On Rocephin  Status post I&D.  May be incidental but appears to be a \"mole\" which had gotten infected.  No significant erythema surrounding.    Chronic: type 2 diabetes, diabetic nephropathy, polyarthritis, kidney stones, cholecystectomy, diverticulosis, hypertension, hyperlipidemia  Subcutaneous insulin.  Continue other medications.    Risk Assessment: High  DVT Prophylaxis: Lovenox  Code Status: Full code  Diet: Cardiac/diabetic    Advance Care Planning  ACP discussion was held with the patient during this visit. Patient does not have an advance directive, information provided.           Brian Joseph Kerley, DO  11/14/24  14:03 EST    Dictated utilizing Dragon dictation.    Electronically signed by Kerley, Brian Joseph, DO at 11/14/24 1409          Emergency Department Notes        Alexis Oneill at 11/14/24 1330       Summary:Report                 4th floor called for report of pt, call sent to the primary RN phone     Electronically signed by Alexis Oneill at 11/14/24 1330       Alexis Oneill at 11/14/24 0914       Summary:Alarm                 Att I called the RN in charge of this pt because of the VFIB alarm. RN was at the bedside and stated pt was just shaking and the monitor was interpreting the movement of the PT    Electronically signed by Alexis Oneill at 11/14/24 0916       Yemi De La Cruz MD at 11/14/24 0806        Procedure Orders    1. Incision & Drainage [[id number]] ordered by Yemi De La Cruz MD    2. Critical Care [[id number]] " ordered by Yemi De La Cruz MD                 Subjective:  History of Present Illness:    Patient is a 64-year-old female history of diabetes mellitus, obesity, hypertension, hyperlipidemia, nephrolithiasis who presents today with chest pain as well as finger pain.  Reports that she has an area of fluctuance on her right index finger.  States this started 2 to 3 days ago.  Yesterday, began to have chest pain.  Denies any cough or congestion.  No abdominal pain.  Denies any nausea vomiting or diarrhea.  No dysuria.  Denies any new leg swelling or leg pain.  No personal history of PE/DVT.      Nurses Notes reviewed and agree, including vitals, allergies, social history and prior medical history.     REVIEW OF SYSTEMS: All systems reviewed and not pertinent unless noted.  Review of Systems   Constitutional:  Positive for activity change. Negative for appetite change, chills, fatigue and fever.   HENT:  Negative for rhinorrhea, sinus pressure and sinus pain.    Eyes:  Negative for discharge and itching.   Respiratory:  Negative for cough and shortness of breath.    Cardiovascular:  Positive for chest pain. Negative for leg swelling.   Gastrointestinal:  Negative for abdominal distention, abdominal pain, nausea and vomiting.   Endocrine: Negative for cold intolerance and heat intolerance.   Genitourinary:  Negative for decreased urine volume, difficulty urinating, flank pain, frequency, urgency, vaginal bleeding, vaginal discharge and vaginal pain.   Musculoskeletal:  Positive for arthralgias. Negative for gait problem, neck pain and neck stiffness.   Skin:  Positive for wound. Negative for color change.   Allergic/Immunologic: Negative for environmental allergies.   Neurological:  Negative for seizures, syncope, facial asymmetry and speech difficulty.   Psychiatric/Behavioral:  Negative for self-injury and suicidal ideas.        Past Medical History:   Diagnosis Date    Cholelithiasis     Removed in 1996    Colon  polyp 2019    Recheck i 3 years    Diabetes mellitus 2017    type II    Diverticulosis 2007    Elevated cholesterol     Gestational hypertension 1999    Hypertension     Kidney stone 26    Multiple gestation     1 still born, 2 live burths    Obesity     Ovarian cyst     Pelvic prolapse     Preeclampsia     UTI (urinary tract infection) 2024    Varicella 1968       Allergies:    Patient has no known allergies.      Past Surgical History:   Procedure Laterality Date    CHOLECYSTECTOMY  1998    COLONOSCOPY  2019    COLONOSCOPY N/A 2023    Procedure: COLONOSCOPY with polypectomy x 5;  Surgeon: Breanne Smith MD;  Location: [facility] ENDOSCOPY;  Service: Gastroenterology;  Laterality: N/A;    KNEE ARTHROSCOPY Left     LAPAROSCOPIC CHOLECYSTECTOMY      TONSILLECTOMY      TUBAL ABDOMINAL LIGATION      VAGINAL PROLAPSE REPAIR           Social History     Socioeconomic History    Marital status:    Tobacco Use    Smoking status: Former     Current packs/day: 0.00     Average packs/day: 1.5 packs/day for 25.6 years (38.4 ttl pk-yrs)     Types: Cigarettes     Start date: 1986     Quit date: 2012     Years since quittin.8     Passive exposure: Past    Smokeless tobacco: Never   Vaping Use    Vaping status: Never Used   Substance and Sexual Activity    Alcohol use: Never    Drug use: Never    Sexual activity: Yes     Partners: Male     Birth control/protection: Post-menopausal, Tubal ligation         Family History   Problem Relation Age of Onset    Cervical cancer Mother     Hyperlipidemia Mother     Stroke Mother     Hypertension Mother     Cancer Father         Brain tumor - unsure if primary or not    Hyperlipidemia Father     Arthritis Brother     Lung cancer Brother     Heart disease Brother     Hyperlipidemia Brother     Hypertension Brother     Diabetes Brother     Hyperlipidemia Brother     Kidney disease Brother     Hypertension  "Brother     Heart disease Brother     Hyperlipidemia Brother     Hypertension Brother     Hyperlipidemia Brother     Celiac disease Daughter     Fibromyalgia Daughter     Sjogren's syndrome Daughter     Colon cancer Neg Hx     Breast cancer Neg Hx        Objective  Physical Exam:  /77   Pulse 108   Temp 97.8 °F (36.6 °C) (Oral)   Resp 18   Ht 160 cm (63\")   Wt 102 kg (224 lb)   SpO2 96%   BMI 39.68 kg/m²      Physical Exam  Constitutional:       General: She is not in acute distress.     Appearance: Normal appearance. She is not ill-appearing.   HENT:      Head: Normocephalic and atraumatic.      Nose: Nose normal. No congestion or rhinorrhea.      Mouth/Throat:      Mouth: Mucous membranes are dry.      Pharynx: Oropharynx is clear. No oropharyngeal exudate or posterior oropharyngeal erythema.   Eyes:      Extraocular Movements: Extraocular movements intact.      Conjunctiva/sclera: Conjunctivae normal.      Pupils: Pupils are equal, round, and reactive to light.   Cardiovascular:      Rate and Rhythm: Normal rate and regular rhythm.      Pulses: Normal pulses.      Heart sounds: Normal heart sounds.   Pulmonary:      Effort: Pulmonary effort is normal. No respiratory distress.      Breath sounds: Normal breath sounds.   Abdominal:      General: Abdomen is flat. Bowel sounds are normal. There is no distension.      Palpations: Abdomen is soft.      Tenderness: There is no abdominal tenderness.   Musculoskeletal:         General: No swelling or tenderness. Normal range of motion.      Cervical back: Normal range of motion and neck supple.      Comments: Area of fluctuance just proximal to the DIP of the right index finger, no surrounding erythema   Skin:     General: Skin is warm and dry.      Capillary Refill: Capillary refill takes less than 2 seconds.   Neurological:      General: No focal deficit present.      Mental Status: She is alert and oriented to person, place, and time. Mental status is at " baseline.      Cranial Nerves: No cranial nerve deficit.      Sensory: No sensory deficit.      Motor: No weakness.      Coordination: Coordination normal.   Psychiatric:         Mood and Affect: Mood normal.         Behavior: Behavior normal.         Thought Content: Thought content normal.         Judgment: Judgment normal.         Incision & Drainage    Date/Time: 11/14/2024 1:41 PM    Performed by: Yemi De La Cruz MD  Authorized by: Yemi De La Cruz MD    Consent:     Consent obtained:  Verbal    Consent given by:  Patient    Risks discussed:  Bleeding, incomplete drainage and infection    Alternatives discussed:  No treatment  Universal protocol:     Patient identity confirmed:  Verbally with patient and arm band  Location:     Type:  Abscess    Location:  Upper extremity    Upper extremity location:  Finger    Finger location:  R index finger  Pre-procedure details:     Skin preparation:  Chlorhexidine  Anesthesia:     Anesthesia method:  Nerve block    Block location:  Finger    Block anesthetic:  Lidocaine 1% w/o epi    Block injection procedure:  Anatomic landmarks identified    Block outcome:  Anesthesia achieved  Procedure type:     Complexity:  Simple  Procedure details:     Incision types:  Stab incision    Drainage:  Purulent and bloody    Drainage amount:  Scant    Wound treatment:  Wound left open    Packing materials:  None  Post-procedure details:     Procedure completion:  Tolerated well, no immediate complications  Critical Care    Performed by: Yemi De La Cruz MD  Authorized by: Yemi De La Cruz MD    Critical care provider statement:     Critical care time (minutes):  30    Critical care time was exclusive of:  Separately billable procedures and treating other patients    Critical care was necessary to treat or prevent imminent or life-threatening deterioration of the following conditions:  Sepsis    Critical care was time spent personally by me on the following activities:   Blood draw for specimens, development of treatment plan with patient or surrogate, discussions with primary provider, evaluation of patient's response to treatment, examination of patient, obtaining history from patient or surrogate, ordering and performing treatments and interventions, ordering and review of laboratory studies, ordering and review of radiographic studies, re-evaluation of patient's condition, pulse oximetry and review of old charts    Care discussed with: admitting provider        ED Course:    ED Course as of 11/14/24 1343   Thu Nov 14, 2024   0829 Chest x-ray independently interpreted by me showing no acute process [JE]   0842 EKG interpreted by me, normal sinus rhythm with no concerning ST changes noted, rate of 96 [JE]      ED Course User Index  [JE] Yemi De La Cruz MD       Lab Results (last 24 hours)       Procedure Component Value Units Date/Time    COVID-19 and FLU A/B PCR, 1 HR TAT - Swab, Nasopharynx [[id number]]  (Normal) Collected: 11/14/24 0814    Specimen: Swab from Nasopharynx Updated: 11/14/24 0936     COVID19 Not Detected     Influenza A PCR Not Detected     Influenza B PCR Not Detected    Narrative:      Fact sheet for providers: https://www.fda.gov/media/223847/download    Fact sheet for patients: https://www.fda.gov/media/842162/download    Test performed by PCR.    Urinalysis With Culture If Indicated - Urine, Clean Catch [[id number]]  (Abnormal) Collected: 11/14/24 0818    Specimen: Urine, Clean Catch Updated: 11/14/24 0835     Color, UA Yellow     Appearance, UA Cloudy     pH, UA 6.0     Specific Gravity, UA 1.020     Glucose, UA >=1000 mg/dL (3+)     Ketones, UA 15 mg/dL (1+)     Bilirubin, UA Small (1+)     Blood, UA Moderate (2+)     Protein, UA Trace     Leuk Esterase, UA Small (1+)     Nitrite, UA Negative     Urobilinogen, UA 1.0 E.U./dL    Narrative:      In absence of clinical symptoms, the presence of pyuria, bacteria, and/or nitrites on the urinalysis result does  not correlate with infection.    Urinalysis, Microscopic Only - Urine, Clean Catch [[id number]]  (Abnormal) Collected: 11/14/24 0818    Specimen: Urine, Clean Catch Updated: 11/14/24 0906     RBC, UA 0-2 /HPF      WBC, UA Too Numerous to Count /HPF      Bacteria, UA 1+ /HPF      Squamous Epithelial Cells, UA 7-12 /HPF      Yeast, UA Small/1+ Budding Yeast /HPF      Hyaline Casts, UA None Seen /LPF      Methodology Manual Light Microscopy    Urine Culture - Urine, Urine, Clean Catch [[id number]] Collected: 11/14/24 0818    Specimen: Urine, Clean Catch Updated: 11/14/24 0906    CBC & Differential [[id number]]  (Abnormal) Collected: 11/14/24 0820    Specimen: Blood Updated: 11/14/24 0911    Narrative:      The following orders were created for panel order CBC & Differential.  Procedure                               Abnormality         Status                     ---------                               -----------         ------                     CBC Auto Differential[[id number]]        Abnormal            Final result                 Please view results for these tests on the individual orders.    Comprehensive Metabolic Panel [[id number]]  (Abnormal) Collected: 11/14/24 0820    Specimen: Blood Updated: 11/14/24 0937     Glucose 269 mg/dL      Comment: Glucose >180, Hemoglobin A1C recommended.        BUN 18 mg/dL      Creatinine 1.02 mg/dL      Sodium 135 mmol/L      Potassium 3.9 mmol/L      Chloride 101 mmol/L      CO2 22.0 mmol/L      Calcium 8.9 mg/dL      Total Protein 6.6 g/dL      Albumin 4.0 g/dL      ALT (SGPT) 109 U/L      AST (SGOT) 147 U/L      Alkaline Phosphatase 95 U/L      Total Bilirubin 0.8 mg/dL      Globulin 2.6 gm/dL      A/G Ratio 1.5 g/dL      BUN/Creatinine Ratio 17.6     Anion Gap 12.0 mmol/L      eGFR 62.3 mL/min/1.73     Narrative:      GFR Normal >60  Chronic Kidney Disease <60  Kidney Failure <15      High Sensitivity Troponin T [[id number]]  (Abnormal) Collected: 11/14/24 0820    Specimen:  Blood Updated: 11/14/24 0940     HS Troponin T 19 ng/L     Narrative:      High Sensitive Troponin T Reference Range:  <14.0 ng/L- Negative Female for AMI  <22.0 ng/L- Negative Male for AMI  >=14 - Abnormal Female indicating possible myocardial injury.  >=22 - Abnormal Male indicating possible myocardial injury.   Clinicians would have to utilize clinical acumen, EKG, Troponin, and serial changes to determine if it is an Acute Myocardial Infarction or myocardial injury due to an underlying chronic condition.         BNP [[id number]]  (Normal) Collected: 11/14/24 0820    Specimen: Blood Updated: 11/14/24 0940     proBNP 298.0 pg/mL     Narrative:      This assay is used as an aid in the diagnosis of individuals suspected of having heart failure. It can be used as an aid in the diagnosis of acute decompensated heart failure (ADHF) in patients presenting with signs and symptoms of ADHF to the emergency department (ED). In addition, NT-proBNP of <300 pg/mL indicates ADHF is not likely.    Age Range Result Interpretation  NT-proBNP Concentration (pg/mL:      <50             Positive            >450                   Gray                 300-450                    Negative             <300    50-75           Positive            >900                  Gray                300-900                  Negative            <300      >75             Positive            >1800                  Gray                300-1800                  Negative            <300    C-reactive Protein [[id number]]  (Abnormal) Collected: 11/14/24 0820    Specimen: Blood Updated: 11/14/24 0937     C-Reactive Protein 11.53 mg/dL     Procalcitonin [[id number]]  (Abnormal) Collected: 11/14/24 0820    Specimen: Blood Updated: 11/14/24 1008     Procalcitonin 3.70 ng/mL     Narrative:      As a Marker for Sepsis (Non-Neonates):    1. <0.5 ng/mL represents a low risk of severe sepsis and/or septic shock.  2. >2 ng/mL represents a high risk of severe sepsis and/or  "septic shock.    As a Marker for Lower Respiratory Tract Infections that require antibiotic therapy:    PCT on Admission    Antibiotic Therapy       6-12 Hrs later    >0.5                Strongly Recommended  >0.25 - <0.5        Recommended   0.1 - 0.25          Discouraged              Remeasure/reassess PCT  <0.1                Strongly Discouraged     Remeasure/reassess PCT    As 28 day mortality risk marker: \"Change in Procalcitonin Result\" (>80% or <=80%) if Day 0 (or Day 1) and Day 4 values are available. Refer to http://[email or web address]-[email or web address]    Change in PCT <=80%  A decrease of PCT levels below or equal to 80% defines a positive change in PCT test result representing a higher risk for 28-day all-cause mortality of patients diagnosed with severe sepsis for septic shock.    Change in PCT >80%  A decrease of PCT levels of more than 80% defines a negative change in PCT result representing a lower risk for 28-day all-cause mortality of patients diagnosed with severe sepsis or septic shock.       Magnesium [[id number]]  (Normal) Collected: 11/14/24 0820    Specimen: Blood Updated: 11/14/24 0937     Magnesium 2.0 mg/dL     CBC Auto Differential [[id number]]  (Abnormal) Collected: 11/14/24 0820    Specimen: Blood Updated: 11/14/24 0911     WBC 7.46 10*3/mm3      RBC 5.12 10*6/mm3      Hemoglobin 13.7 g/dL      Hematocrit 40.4 %      MCV 78.9 fL      MCH 26.8 pg      MCHC 33.9 g/dL      RDW 13.0 %      RDW-SD 37.0 fl      MPV 10.0 fL      Platelets 224 10*3/mm3      Neutrophil % 92.7 %      Lymphocyte % 2.5 %      Monocyte % 1.3 %      Eosinophil % 2.3 %      Basophil % 0.4 %      Immature Grans % 0.8 %      Neutrophils, Absolute 6.91 10*3/mm3      Lymphocytes, Absolute 0.19 10*3/mm3      Monocytes, Absolute 0.10 10*3/mm3      Eosinophils, Absolute 0.17 10*3/mm3      Basophils, Absolute 0.03 10*3/mm3      Immature Grans, Absolute 0.06 10*3/mm3      nRBC 0.0 /100 WBC     High Sensitivity Troponin T 2Hr " [[id number]]  (Abnormal) Collected: 11/14/24 1027    Specimen: Blood Updated: 11/14/24 1107     HS Troponin T 21 ng/L      Troponin T Delta 2 ng/L     Narrative:      High Sensitive Troponin T Reference Range:  <14.0 ng/L- Negative Female for AMI  <22.0 ng/L- Negative Male for AMI  >=14 - Abnormal Female indicating possible myocardial injury.  >=22 - Abnormal Male indicating possible myocardial injury.   Clinicians would have to utilize clinical acumen, EKG, Troponin, and serial changes to determine if it is an Acute Myocardial Infarction or myocardial injury due to an underlying chronic condition.         Lactic Acid, Plasma [[id number]]  (Abnormal) Collected: 11/14/24 1027    Specimen: Blood Updated: 11/14/24 1123     Lactate 2.5 mmol/L     Blood Culture With JADEN - Blood, Arm, Left [[id number]] Collected: 11/14/24 1027    Specimen: Blood from Arm, Left Updated: 11/14/24 1041    Blood Culture With JADEN - Blood, Hand, Right [[id number]] Collected: 11/14/24 1034    Specimen: Blood from Hand, Right Updated: 11/14/24 1041             CT Abdomen Pelvis Without Contrast    Result Date: 11/14/2024  PROCEDURE: CT ABDOMEN PELVIS WO CONTRAST-  HISTORY: Eval nephrolithiasis  COMPARISON: August 21, 2024..  PROCEDURE: Axial images were obtained from the lung bases to the pubic symphysis by computed tomography. This study was performed with techniques to keep radiation doses as low as reasonably achievable, (ALARA). Individualized dose reduction techniques using automated exposure control or adjustment of mA and/or kV according to the patient size were employed.  FINDINGS:  ABDOMEN: The lung bases are clear. The heart is proper size. The limited non-contrast images of the liver demonstrate diffuse fatty infiltration without focal mass. Liver is enlarged at 20.8 cm. Portal vein is normal in diameter. There are metallic surgical clips in the right upper quadrant consistent with previous cholecystectomy. The spleen is mildly enlarged at  13 cm. Findings are similar to prior exam. No adrenal masses are seen. The aorta is normal in caliber. No free fluid identified. There is a prominent 2.5 cm precaval lymph node which is stable from the prior exam. Vascular calcifications noted. There is no nephrolithiasis. There is no hydronephrosis. Bilateral perirenal stranding again noted. Small umbilical hernia containing fat noted. Uterus is midline and to the left.  PELVIS: The GI tract demonstrate no obstruction. Cecum is located anteriorly in the right lower quadrant. There is diverticulosis without evidence of diverticulitis. The appendix is not identified; no secondary signs of appendicitis seen. The urinary bladder is unremarkable. There is no fluid or adenopathy.      Impression: No hydronephrosis or nephrolithiasis.  Enlarged, fatty infiltrated liver with mild splenomegaly but normal portal vein; findings similar to prior.  Diverticulosis.  Stable precaval lymph node from prior exam.   CTDI: 12.29 mGy DLP:591.12 mGy.cm  This report was signed and finalized on 11/14/2024 11:57 AM by Marycruz Maldonado MD.      XR Chest 1 View    Result Date: 11/14/2024  PROCEDURE: XR CHEST 1 VW-  HISTORY: Chest pain, mid chest pain that goes into shoulders and of the neck for 1 day.  COMPARISON: August 21, 2024..  FINDINGS: The heart is upper limits of normal, stable.. The lungs are clear. The mediastinum is unremarkable. There is no pneumothorax.  There are no acute osseous abnormalities. Apical lordotic positioning noted.      Impression: No acute cardiopulmonary process.     This report was signed and finalized on 11/14/2024 8:38 AM by Marycruz Maldonado MD.          MDM      Initial impression of presenting illness: Finger pain, chest pain    DDX: includes but is not limited to: Felon, ACS, MI, Bactrim pneumonia, viral URI, PE    Patient arrives stable with vitals interpreted by myself.     Pertinent features from physical exam: Clear to auscultation, regular rate and rhythm, no  murmur, nontender to abdominal palpation, not tachycardic, not tachypneic, not hypoxic, exam, area of fluctuance just proximal to the DIP on the right index finger.    Initial diagnostic plan: CBC, CMP, troponin, BNP, EKG, chest x-ray, CRP, Pro-Niles, magnesium    Results from initial plan were reviewed and interpreted by me revealing concern for possible sepsis given elevated inflammatory markers in the setting of urinary tract infection, abscess of the finger drained as above    Diagnostic information from other sources: Katharina with significant other at bedside reviewed past medical records    Interventions / Re-evaluation: Ordered lidocaine finger block    Results/clinical rationale were discussed with patient at bedside as well as  at bedside    Consultations/Discussion of results with other physicians: Given my concern for sepsis secondary to urinary tract infection, discussed with Dr. Gutierrez, hospitalist, and admitted to her service for further management    Disposition plan: Admit  -----        Final diagnoses:   Acute cystitis without hematuria          Yemi De La Cruz MD  11/14/24 1343      Electronically signed by Yemi De La Cruz MD at 11/14/24 1343       Vital Signs (last day)       Date/Time Temp Temp src Pulse Resp BP Patient Position SpO2    11/14/24 1405 100.9 (38.3) Oral 109 16 144/57 Lying 95    11/14/24 1344 -- -- 104 -- -- -- 92    11/14/24 1332 -- -- 108 -- 155/77 -- 96    11/14/24 1200 -- -- 98 -- 146/67 -- 93    11/14/24 1105 -- -- 109 -- -- -- 93    11/14/24 1103 -- -- 110 -- -- -- 91    11/14/24 1102 -- -- 108 -- -- -- 93    11/14/24 1101 -- -- 111 -- -- -- --    11/14/24 1100 -- -- -- -- 125/73 -- --    11/14/24 0930 -- -- 121 -- -- -- --    11/14/24 0928 -- -- 123 -- -- -- 95    11/14/24 0759 97.8 (36.6) Oral 94 18 134/88 Sitting 98          No current facility-administered medications for this encounter.     Lab Results (last 24 hours)       Procedure Component Value Units  "Date/Time    Lactic Acid, Plasma [[id number]]  (Abnormal) Collected: 11/14/24 1027    Specimen: Blood Updated: 11/14/24 1123     Lactate 2.5 mmol/L     High Sensitivity Troponin T 2Hr [[id number]]  (Abnormal) Collected: 11/14/24 1027    Specimen: Blood Updated: 11/14/24 1107     HS Troponin T 21 ng/L      Troponin T Delta 2 ng/L     Narrative:      High Sensitive Troponin T Reference Range:  <14.0 ng/L- Negative Female for AMI  <22.0 ng/L- Negative Male for AMI  >=14 - Abnormal Female indicating possible myocardial injury.  >=22 - Abnormal Male indicating possible myocardial injury.   Clinicians would have to utilize clinical acumen, EKG, Troponin, and serial changes to determine if it is an Acute Myocardial Infarction or myocardial injury due to an underlying chronic condition.         Blood Culture With JADEN - Blood, Hand, Right [[id number]] Collected: 11/14/24 1034    Specimen: Blood from Hand, Right Updated: 11/14/24 1041    Blood Culture With JADEN - Blood, Arm, Left [[id number]] Collected: 11/14/24 1027    Specimen: Blood from Arm, Left Updated: 11/14/24 1041    Procalcitonin [[id number]]  (Abnormal) Collected: 11/14/24 0820    Specimen: Blood Updated: 11/14/24 1008     Procalcitonin 3.70 ng/mL     Narrative:      As a Marker for Sepsis (Non-Neonates):    1. <0.5 ng/mL represents a low risk of severe sepsis and/or septic shock.  2. >2 ng/mL represents a high risk of severe sepsis and/or septic shock.    As a Marker for Lower Respiratory Tract Infections that require antibiotic therapy:    PCT on Admission    Antibiotic Therapy       6-12 Hrs later    >0.5                Strongly Recommended  >0.25 - <0.5        Recommended   0.1 - 0.25          Discouraged              Remeasure/reassess PCT  <0.1                Strongly Discouraged     Remeasure/reassess PCT    As 28 day mortality risk marker: \"Change in Procalcitonin Result\" (>80% or <=80%) if Day 0 (or Day 1) and Day 4 values are available. Refer to " [email or web address]    Change in PCT <=80%  A decrease of PCT levels below or equal to 80% defines a positive change in PCT test result representing a higher risk for 28-day all-cause mortality of patients diagnosed with severe sepsis for septic shock.    Change in PCT >80%  A decrease of PCT levels of more than 80% defines a negative change in PCT result representing a lower risk for 28-day all-cause mortality of patients diagnosed with severe sepsis or septic shock.       High Sensitivity Troponin T [[id number]]  (Abnormal) Collected: 11/14/24 0820    Specimen: Blood Updated: 11/14/24 0940     HS Troponin T 19 ng/L     Narrative:      High Sensitive Troponin T Reference Range:  <14.0 ng/L- Negative Female for AMI  <22.0 ng/L- Negative Male for AMI  >=14 - Abnormal Female indicating possible myocardial injury.  >=22 - Abnormal Male indicating possible myocardial injury.   Clinicians would have to utilize clinical acumen, EKG, Troponin, and serial changes to determine if it is an Acute Myocardial Infarction or myocardial injury due to an underlying chronic condition.         BNP [[id number]]  (Normal) Collected: 11/14/24 0820    Specimen: Blood Updated: 11/14/24 0940     proBNP 298.0 pg/mL     Narrative:      This assay is used as an aid in the diagnosis of individuals suspected of having heart failure. It can be used as an aid in the diagnosis of acute decompensated heart failure (ADHF) in patients presenting with signs and symptoms of ADHF to the emergency department (ED). In addition, NT-proBNP of <300 pg/mL indicates ADHF is not likely.    Age Range Result Interpretation  NT-proBNP Concentration (pg/mL:      <50             Positive            >450                   Gray                 300-450                    Negative             <300    50-75           Positive            >900                  Gray                300-900                  Negative            <300      >75             Positive             >1800                  Gray                300-1800                  Negative            <300    Comprehensive Metabolic Panel [[id number]]  (Abnormal) Collected: 11/14/24 0820    Specimen: Blood Updated: 11/14/24 0937     Glucose 269 mg/dL      Comment: Glucose >180, Hemoglobin A1C recommended.        BUN 18 mg/dL      Creatinine 1.02 mg/dL      Sodium 135 mmol/L      Potassium 3.9 mmol/L      Chloride 101 mmol/L      CO2 22.0 mmol/L      Calcium 8.9 mg/dL      Total Protein 6.6 g/dL      Albumin 4.0 g/dL      ALT (SGPT) 109 U/L      AST (SGOT) 147 U/L      Alkaline Phosphatase 95 U/L      Total Bilirubin 0.8 mg/dL      Globulin 2.6 gm/dL      A/G Ratio 1.5 g/dL      BUN/Creatinine Ratio 17.6     Anion Gap 12.0 mmol/L      eGFR 62.3 mL/min/1.73     Narrative:      GFR Normal >60  Chronic Kidney Disease <60  Kidney Failure <15      C-reactive Protein [[id number]]  (Abnormal) Collected: 11/14/24 0820    Specimen: Blood Updated: 11/14/24 0937     C-Reactive Protein 11.53 mg/dL     Magnesium [[id number]]  (Normal) Collected: 11/14/24 0820    Specimen: Blood Updated: 11/14/24 0937     Magnesium 2.0 mg/dL     COVID-19 and FLU A/B PCR, 1 HR TAT - Swab, Nasopharynx [[id number]]  (Normal) Collected: 11/14/24 0814    Specimen: Swab from Nasopharynx Updated: 11/14/24 0936     COVID19 Not Detected     Influenza A PCR Not Detected     Influenza B PCR Not Detected    Narrative:      Fact sheet for providers: https://www.fda.gov/media/412274/download    Fact sheet for patients: https://www.fda.gov/media/696000/download    Test performed by PCR.    CBC & Differential [[id number]]  (Abnormal) Collected: 11/14/24 0820    Specimen: Blood Updated: 11/14/24 0911    Narrative:      The following orders were created for panel order CBC & Differential.  Procedure                               Abnormality         Status                     ---------                               -----------         ------                     CBC Auto  Differential[[id number]]        Abnormal            Final result                 Please view results for these tests on the individual orders.    CBC Auto Differential [[id number]]  (Abnormal) Collected: 11/14/24 0820    Specimen: Blood Updated: 11/14/24 0911     WBC 7.46 10*3/mm3      RBC 5.12 10*6/mm3      Hemoglobin 13.7 g/dL      Hematocrit 40.4 %      MCV 78.9 fL      MCH 26.8 pg      MCHC 33.9 g/dL      RDW 13.0 %      RDW-SD 37.0 fl      MPV 10.0 fL      Platelets 224 10*3/mm3      Neutrophil % 92.7 %      Lymphocyte % 2.5 %      Monocyte % 1.3 %      Eosinophil % 2.3 %      Basophil % 0.4 %      Immature Grans % 0.8 %      Neutrophils, Absolute 6.91 10*3/mm3      Lymphocytes, Absolute 0.19 10*3/mm3      Monocytes, Absolute 0.10 10*3/mm3      Eosinophils, Absolute 0.17 10*3/mm3      Basophils, Absolute 0.03 10*3/mm3      Immature Grans, Absolute 0.06 10*3/mm3      nRBC 0.0 /100 WBC     Urinalysis, Microscopic Only - Urine, Clean Catch [[id number]]  (Abnormal) Collected: 11/14/24 0818    Specimen: Urine, Clean Catch Updated: 11/14/24 0906     RBC, UA 0-2 /HPF      WBC, UA Too Numerous to Count /HPF      Bacteria, UA 1+ /HPF      Squamous Epithelial Cells, UA 7-12 /HPF      Yeast, UA Small/1+ Budding Yeast /HPF      Hyaline Casts, UA None Seen /LPF      Methodology Manual Light Microscopy    Urine Culture - Urine, Urine, Clean Catch [[id number]] Collected: 11/14/24 0818    Specimen: Urine, Clean Catch Updated: 11/14/24 0906    Urinalysis With Culture If Indicated - Urine, Clean Catch [[id number]]  (Abnormal) Collected: 11/14/24 0818    Specimen: Urine, Clean Catch Updated: 11/14/24 0835     Color, UA Yellow     Appearance, UA Cloudy     pH, UA 6.0     Specific Gravity, UA 1.020     Glucose, UA >=1000 mg/dL (3+)     Ketones, UA 15 mg/dL (1+)     Bilirubin, UA Small (1+)     Blood, UA Moderate (2+)     Protein, UA Trace     Leuk Esterase, UA Small (1+)     Nitrite, UA Negative     Urobilinogen, UA 1.0 E.U./dL     Narrative:      In absence of clinical symptoms, the presence of pyuria, bacteria, and/or nitrites on the urinalysis result does not correlate with infection.          Physician Progress Notes (last 24 hours)  Notes from 11/13/24 1414 through 11/14/24 1414   No notes of this type exist for this encounter.       Consult Notes (last 24 hours)  Notes from 11/13/24 1414 through 11/14/24 1414   No notes of this type exist for this encounter.

## 2024-11-14 NOTE — ED PROVIDER NOTES
Subjective:  History of Present Illness:    Patient is a 64-year-old female history of diabetes mellitus, obesity, hypertension, hyperlipidemia, nephrolithiasis who presents today with chest pain as well as finger pain.  Reports that she has an area of fluctuance on her right index finger.  States this started 2 to 3 days ago.  Yesterday, began to have chest pain.  Denies any cough or congestion.  No abdominal pain.  Denies any nausea vomiting or diarrhea.  No dysuria.  Denies any new leg swelling or leg pain.  No personal history of PE/DVT.      Nurses Notes reviewed and agree, including vitals, allergies, social history and prior medical history.     REVIEW OF SYSTEMS: All systems reviewed and not pertinent unless noted.  Review of Systems   Constitutional:  Positive for activity change. Negative for appetite change, chills, fatigue and fever.   HENT:  Negative for rhinorrhea, sinus pressure and sinus pain.    Eyes:  Negative for discharge and itching.   Respiratory:  Negative for cough and shortness of breath.    Cardiovascular:  Positive for chest pain. Negative for leg swelling.   Gastrointestinal:  Negative for abdominal distention, abdominal pain, nausea and vomiting.   Endocrine: Negative for cold intolerance and heat intolerance.   Genitourinary:  Negative for decreased urine volume, difficulty urinating, flank pain, frequency, urgency, vaginal bleeding, vaginal discharge and vaginal pain.   Musculoskeletal:  Positive for arthralgias. Negative for gait problem, neck pain and neck stiffness.   Skin:  Positive for wound. Negative for color change.   Allergic/Immunologic: Negative for environmental allergies.   Neurological:  Negative for seizures, syncope, facial asymmetry and speech difficulty.   Psychiatric/Behavioral:  Negative for self-injury and suicidal ideas.        Past Medical History:   Diagnosis Date    Cholelithiasis     Removed in 1996    Colon polyp 12/2019    Recheck i 3 years    Diabetes  mellitus 2017    type II    Diverticulosis 2007    Elevated cholesterol     Gestational hypertension 1999    Hypertension     Kidney stone 26    Multiple gestation     1 still born, 2 live burths    Obesity     Ovarian cyst     Pelvic prolapse     Preeclampsia     UTI (urinary tract infection) 2024    Varicella 1968       Allergies:    Patient has no known allergies.      Past Surgical History:   Procedure Laterality Date    CHOLECYSTECTOMY      COLONOSCOPY  2019    COLONOSCOPY N/A 2023    Procedure: COLONOSCOPY with polypectomy x 5;  Surgeon: Breanne Smith MD;  Location: [facility] ENDOSCOPY;  Service: Gastroenterology;  Laterality: N/A;    KNEE ARTHROSCOPY Left     LAPAROSCOPIC CHOLECYSTECTOMY      TONSILLECTOMY      TUBAL ABDOMINAL LIGATION      VAGINAL PROLAPSE REPAIR  2006         Social History     Socioeconomic History    Marital status:    Tobacco Use    Smoking status: Former     Current packs/day: 0.00     Average packs/day: 1.5 packs/day for 25.6 years (38.4 ttl pk-yrs)     Types: Cigarettes     Start date: 1986     Quit date: 2012     Years since quittin.8     Passive exposure: Past    Smokeless tobacco: Never   Vaping Use    Vaping status: Never Used   Substance and Sexual Activity    Alcohol use: Never    Drug use: Never    Sexual activity: Yes     Partners: Male     Birth control/protection: Post-menopausal, Tubal ligation         Family History   Problem Relation Age of Onset    Cervical cancer Mother     Hyperlipidemia Mother     Stroke Mother     Hypertension Mother     Cancer Father         Brain tumor - unsure if primary or not    Hyperlipidemia Father     Arthritis Brother     Lung cancer Brother     Heart disease Brother     Hyperlipidemia Brother     Hypertension Brother     Diabetes Brother     Hyperlipidemia Brother     Kidney disease Brother     Hypertension Brother     Heart disease Brother     Hyperlipidemia  "Brother     Hypertension Brother     Hyperlipidemia Brother     Celiac disease Daughter     Fibromyalgia Daughter     Sjogren's syndrome Daughter     Colon cancer Neg Hx     Breast cancer Neg Hx        Objective  Physical Exam:  /77   Pulse 108   Temp 97.8 °F (36.6 °C) (Oral)   Resp 18   Ht 160 cm (63\")   Wt 102 kg (224 lb)   SpO2 96%   BMI 39.68 kg/m²      Physical Exam  Constitutional:       General: She is not in acute distress.     Appearance: Normal appearance. She is not ill-appearing.   HENT:      Head: Normocephalic and atraumatic.      Nose: Nose normal. No congestion or rhinorrhea.      Mouth/Throat:      Mouth: Mucous membranes are dry.      Pharynx: Oropharynx is clear. No oropharyngeal exudate or posterior oropharyngeal erythema.   Eyes:      Extraocular Movements: Extraocular movements intact.      Conjunctiva/sclera: Conjunctivae normal.      Pupils: Pupils are equal, round, and reactive to light.   Cardiovascular:      Rate and Rhythm: Normal rate and regular rhythm.      Pulses: Normal pulses.      Heart sounds: Normal heart sounds.   Pulmonary:      Effort: Pulmonary effort is normal. No respiratory distress.      Breath sounds: Normal breath sounds.   Abdominal:      General: Abdomen is flat. Bowel sounds are normal. There is no distension.      Palpations: Abdomen is soft.      Tenderness: There is no abdominal tenderness.   Musculoskeletal:         General: No swelling or tenderness. Normal range of motion.      Cervical back: Normal range of motion and neck supple.      Comments: Area of fluctuance just proximal to the DIP of the right index finger, no surrounding erythema   Skin:     General: Skin is warm and dry.      Capillary Refill: Capillary refill takes less than 2 seconds.   Neurological:      General: No focal deficit present.      Mental Status: She is alert and oriented to person, place, and time. Mental status is at baseline.      Cranial Nerves: No cranial nerve " deficit.      Sensory: No sensory deficit.      Motor: No weakness.      Coordination: Coordination normal.   Psychiatric:         Mood and Affect: Mood normal.         Behavior: Behavior normal.         Thought Content: Thought content normal.         Judgment: Judgment normal.         Incision & Drainage    Date/Time: 11/14/2024 1:41 PM    Performed by: Yemi De La Cruz MD  Authorized by: Yemi De La Cruz MD    Consent:     Consent obtained:  Verbal    Consent given by:  Patient    Risks discussed:  Bleeding, incomplete drainage and infection    Alternatives discussed:  No treatment  Universal protocol:     Patient identity confirmed:  Verbally with patient and arm band  Location:     Type:  Abscess    Location:  Upper extremity    Upper extremity location:  Finger    Finger location:  R index finger  Pre-procedure details:     Skin preparation:  Chlorhexidine  Anesthesia:     Anesthesia method:  Nerve block    Block location:  Finger    Block anesthetic:  Lidocaine 1% w/o epi    Block injection procedure:  Anatomic landmarks identified    Block outcome:  Anesthesia achieved  Procedure type:     Complexity:  Simple  Procedure details:     Incision types:  Stab incision    Drainage:  Purulent and bloody    Drainage amount:  Scant    Wound treatment:  Wound left open    Packing materials:  None  Post-procedure details:     Procedure completion:  Tolerated well, no immediate complications  Critical Care    Performed by: Yemi De La Cruz MD  Authorized by: Yemi De La Cruz MD    Critical care provider statement:     Critical care time (minutes):  30    Critical care time was exclusive of:  Separately billable procedures and treating other patients    Critical care was necessary to treat or prevent imminent or life-threatening deterioration of the following conditions:  Sepsis    Critical care was time spent personally by me on the following activities:  Blood draw for specimens, development of  treatment plan with patient or surrogate, discussions with primary provider, evaluation of patient's response to treatment, examination of patient, obtaining history from patient or surrogate, ordering and performing treatments and interventions, ordering and review of laboratory studies, ordering and review of radiographic studies, re-evaluation of patient's condition, pulse oximetry and review of old charts    Care discussed with: admitting provider        ED Course:    ED Course as of 11/14/24 1343   Thu Nov 14, 2024   0829 Chest x-ray independently interpreted by me showing no acute process [JE]   0842 EKG interpreted by me, normal sinus rhythm with no concerning ST changes noted, rate of 96 [JE]      ED Course User Index  [JE] Yemi De La Cruz MD       Lab Results (last 24 hours)       Procedure Component Value Units Date/Time    COVID-19 and FLU A/B PCR, 1 HR TAT - Swab, Nasopharynx [[id number]]  (Normal) Collected: 11/14/24 0814    Specimen: Swab from Nasopharynx Updated: 11/14/24 0936     COVID19 Not Detected     Influenza A PCR Not Detected     Influenza B PCR Not Detected    Narrative:      Fact sheet for providers: https://www.fda.gov/media/471105/download    Fact sheet for patients: https://www.fda.gov/media/437807/download    Test performed by PCR.    Urinalysis With Culture If Indicated - Urine, Clean Catch [[id number]]  (Abnormal) Collected: 11/14/24 0818    Specimen: Urine, Clean Catch Updated: 11/14/24 0835     Color, UA Yellow     Appearance, UA Cloudy     pH, UA 6.0     Specific Gravity, UA 1.020     Glucose, UA >=1000 mg/dL (3+)     Ketones, UA 15 mg/dL (1+)     Bilirubin, UA Small (1+)     Blood, UA Moderate (2+)     Protein, UA Trace     Leuk Esterase, UA Small (1+)     Nitrite, UA Negative     Urobilinogen, UA 1.0 E.U./dL    Narrative:      In absence of clinical symptoms, the presence of pyuria, bacteria, and/or nitrites on the urinalysis result does not correlate with infection.     Urinalysis, Microscopic Only - Urine, Clean Catch [[id number]]  (Abnormal) Collected: 11/14/24 0818    Specimen: Urine, Clean Catch Updated: 11/14/24 0906     RBC, UA 0-2 /HPF      WBC, UA Too Numerous to Count /HPF      Bacteria, UA 1+ /HPF      Squamous Epithelial Cells, UA 7-12 /HPF      Yeast, UA Small/1+ Budding Yeast /HPF      Hyaline Casts, UA None Seen /LPF      Methodology Manual Light Microscopy    Urine Culture - Urine, Urine, Clean Catch [[id number]] Collected: 11/14/24 0818    Specimen: Urine, Clean Catch Updated: 11/14/24 0906    CBC & Differential [[id number]]  (Abnormal) Collected: 11/14/24 0820    Specimen: Blood Updated: 11/14/24 0911    Narrative:      The following orders were created for panel order CBC & Differential.  Procedure                               Abnormality         Status                     ---------                               -----------         ------                     CBC Auto Differential[[id number]]        Abnormal            Final result                 Please view results for these tests on the individual orders.    Comprehensive Metabolic Panel [[id number]]  (Abnormal) Collected: 11/14/24 0820    Specimen: Blood Updated: 11/14/24 0937     Glucose 269 mg/dL      Comment: Glucose >180, Hemoglobin A1C recommended.        BUN 18 mg/dL      Creatinine 1.02 mg/dL      Sodium 135 mmol/L      Potassium 3.9 mmol/L      Chloride 101 mmol/L      CO2 22.0 mmol/L      Calcium 8.9 mg/dL      Total Protein 6.6 g/dL      Albumin 4.0 g/dL      ALT (SGPT) 109 U/L      AST (SGOT) 147 U/L      Alkaline Phosphatase 95 U/L      Total Bilirubin 0.8 mg/dL      Globulin 2.6 gm/dL      A/G Ratio 1.5 g/dL      BUN/Creatinine Ratio 17.6     Anion Gap 12.0 mmol/L      eGFR 62.3 mL/min/1.73     Narrative:      GFR Normal >60  Chronic Kidney Disease <60  Kidney Failure <15      High Sensitivity Troponin T [[id number]]  (Abnormal) Collected: 11/14/24 0820    Specimen: Blood Updated: 11/14/24 0940     HS  Troponin T 19 ng/L     Narrative:      High Sensitive Troponin T Reference Range:  <14.0 ng/L- Negative Female for AMI  <22.0 ng/L- Negative Male for AMI  >=14 - Abnormal Female indicating possible myocardial injury.  >=22 - Abnormal Male indicating possible myocardial injury.   Clinicians would have to utilize clinical acumen, EKG, Troponin, and serial changes to determine if it is an Acute Myocardial Infarction or myocardial injury due to an underlying chronic condition.         BNP [[id number]]  (Normal) Collected: 11/14/24 0820    Specimen: Blood Updated: 11/14/24 0940     proBNP 298.0 pg/mL     Narrative:      This assay is used as an aid in the diagnosis of individuals suspected of having heart failure. It can be used as an aid in the diagnosis of acute decompensated heart failure (ADHF) in patients presenting with signs and symptoms of ADHF to the emergency department (ED). In addition, NT-proBNP of <300 pg/mL indicates ADHF is not likely.    Age Range Result Interpretation  NT-proBNP Concentration (pg/mL:      <50             Positive            >450                   Gray                 300-450                    Negative             <300    50-75           Positive            >900                  Gray                300-900                  Negative            <300      >75             Positive            >1800                  Gray                300-1800                  Negative            <300    C-reactive Protein [[id number]]  (Abnormal) Collected: 11/14/24 0820    Specimen: Blood Updated: 11/14/24 0937     C-Reactive Protein 11.53 mg/dL     Procalcitonin [[id number]]  (Abnormal) Collected: 11/14/24 0820    Specimen: Blood Updated: 11/14/24 1008     Procalcitonin 3.70 ng/mL     Narrative:      As a Marker for Sepsis (Non-Neonates):    1. <0.5 ng/mL represents a low risk of severe sepsis and/or septic shock.  2. >2 ng/mL represents a high risk of severe sepsis and/or septic shock.    As a Marker for  "Lower Respiratory Tract Infections that require antibiotic therapy:    PCT on Admission    Antibiotic Therapy       6-12 Hrs later    >0.5                Strongly Recommended  >0.25 - <0.5        Recommended   0.1 - 0.25          Discouraged              Remeasure/reassess PCT  <0.1                Strongly Discouraged     Remeasure/reassess PCT    As 28 day mortality risk marker: \"Change in Procalcitonin Result\" (>80% or <=80%) if Day 0 (or Day 1) and Day 4 values are available. Refer to http://www.[facility]pct-calculator.com    Change in PCT <=80%  A decrease of PCT levels below or equal to 80% defines a positive change in PCT test result representing a higher risk for 28-day all-cause mortality of patients diagnosed with severe sepsis for septic shock.    Change in PCT >80%  A decrease of PCT levels of more than 80% defines a negative change in PCT result representing a lower risk for 28-day all-cause mortality of patients diagnosed with severe sepsis or septic shock.       Magnesium [[id number]]  (Normal) Collected: 11/14/24 0820    Specimen: Blood Updated: 11/14/24 0937     Magnesium 2.0 mg/dL     CBC Auto Differential [[id number]]  (Abnormal) Collected: 11/14/24 0820    Specimen: Blood Updated: 11/14/24 0911     WBC 7.46 10*3/mm3      RBC 5.12 10*6/mm3      Hemoglobin 13.7 g/dL      Hematocrit 40.4 %      MCV 78.9 fL      MCH 26.8 pg      MCHC 33.9 g/dL      RDW 13.0 %      RDW-SD 37.0 fl      MPV 10.0 fL      Platelets 224 10*3/mm3      Neutrophil % 92.7 %      Lymphocyte % 2.5 %      Monocyte % 1.3 %      Eosinophil % 2.3 %      Basophil % 0.4 %      Immature Grans % 0.8 %      Neutrophils, Absolute 6.91 10*3/mm3      Lymphocytes, Absolute 0.19 10*3/mm3      Monocytes, Absolute 0.10 10*3/mm3      Eosinophils, Absolute 0.17 10*3/mm3      Basophils, Absolute 0.03 10*3/mm3      Immature Grans, Absolute 0.06 10*3/mm3      nRBC 0.0 /100 WBC     High Sensitivity Troponin T 2Hr [[id number]]  (Abnormal) Collected: " 11/14/24 1027    Specimen: Blood Updated: 11/14/24 1107     HS Troponin T 21 ng/L      Troponin T Delta 2 ng/L     Narrative:      High Sensitive Troponin T Reference Range:  <14.0 ng/L- Negative Female for AMI  <22.0 ng/L- Negative Male for AMI  >=14 - Abnormal Female indicating possible myocardial injury.  >=22 - Abnormal Male indicating possible myocardial injury.   Clinicians would have to utilize clinical acumen, EKG, Troponin, and serial changes to determine if it is an Acute Myocardial Infarction or myocardial injury due to an underlying chronic condition.         Lactic Acid, Plasma [[id number]]  (Abnormal) Collected: 11/14/24 1027    Specimen: Blood Updated: 11/14/24 1123     Lactate 2.5 mmol/L     Blood Culture With JADEN - Blood, Arm, Left [[id number]] Collected: 11/14/24 1027    Specimen: Blood from Arm, Left Updated: 11/14/24 1041    Blood Culture With JADEN - Blood, Hand, Right [[id number]] Collected: 11/14/24 1034    Specimen: Blood from Hand, Right Updated: 11/14/24 1041             CT Abdomen Pelvis Without Contrast    Result Date: 11/14/2024  PROCEDURE: CT ABDOMEN PELVIS WO CONTRAST-  HISTORY: Eval nephrolithiasis  COMPARISON: August 21, 2024..  PROCEDURE: Axial images were obtained from the lung bases to the pubic symphysis by computed tomography. This study was performed with techniques to keep radiation doses as low as reasonably achievable, (ALARA). Individualized dose reduction techniques using automated exposure control or adjustment of mA and/or kV according to the patient size were employed.  FINDINGS:  ABDOMEN: The lung bases are clear. The heart is proper size. The limited non-contrast images of the liver demonstrate diffuse fatty infiltration without focal mass. Liver is enlarged at 20.8 cm. Portal vein is normal in diameter. There are metallic surgical clips in the right upper quadrant consistent with previous cholecystectomy. The spleen is mildly enlarged at 13 cm. Findings are similar to  prior exam. No adrenal masses are seen. The aorta is normal in caliber. No free fluid identified. There is a prominent 2.5 cm precaval lymph node which is stable from the prior exam. Vascular calcifications noted. There is no nephrolithiasis. There is no hydronephrosis. Bilateral perirenal stranding again noted. Small umbilical hernia containing fat noted. Uterus is midline and to the left.  PELVIS: The GI tract demonstrate no obstruction. Cecum is located anteriorly in the right lower quadrant. There is diverticulosis without evidence of diverticulitis. The appendix is not identified; no secondary signs of appendicitis seen. The urinary bladder is unremarkable. There is no fluid or adenopathy.      Impression: No hydronephrosis or nephrolithiasis.  Enlarged, fatty infiltrated liver with mild splenomegaly but normal portal vein; findings similar to prior.  Diverticulosis.  Stable precaval lymph node from prior exam.   CTDI: 12.29 mGy DLP:591.12 mGy.cm  This report was signed and finalized on 11/14/2024 11:57 AM by Marycruz Maldonado MD.      XR Chest 1 View    Result Date: 11/14/2024  PROCEDURE: XR CHEST 1 VW-  HISTORY: Chest pain, mid chest pain that goes into shoulders and of the neck for 1 day.  COMPARISON: August 21, 2024..  FINDINGS: The heart is upper limits of normal, stable.. The lungs are clear. The mediastinum is unremarkable. There is no pneumothorax.  There are no acute osseous abnormalities. Apical lordotic positioning noted.      Impression: No acute cardiopulmonary process.     This report was signed and finalized on 11/14/2024 8:38 AM by Marycruz Maldonado MD.          MDM      Initial impression of presenting illness: Finger pain, chest pain    DDX: includes but is not limited to: Felon, ACS, MI, Bactrim pneumonia, viral URI, PE    Patient arrives stable with vitals interpreted by myself.     Pertinent features from physical exam: Clear to auscultation, regular rate and rhythm, no murmur, nontender to abdominal  palpation, not tachycardic, not tachypneic, not hypoxic, exam, area of fluctuance just proximal to the DIP on the right index finger.    Initial diagnostic plan: CBC, CMP, troponin, BNP, EKG, chest x-ray, CRP, Pro-[place]iles, magnesium    Results from initial plan were reviewed and interpreted by me revealing concern for possible sepsis given elevated inflammatory markers in the setting of urinary tract infection, abscess of the finger drained as above    Diagnostic information from other sources: Katharina with significant other at bedside reviewed past medical records    Interventions / Re-evaluation: Ordered lidocaine finger block    Results/clinical rationale were discussed with patient at bedside as well as  at bedside    Consultations/Discussion of results with other physicians: Given my concern for sepsis secondary to urinary tract infection, discussed with Dr. Gutierrez, hospitalist, and admitted to her service for further management    Disposition plan: Admit  -----        Final diagnoses:   Acute cystitis without hematuria          Yemi De La Cruz MD  11/14/24 3314

## 2024-11-14 NOTE — PLAN OF CARE
Problem: Adult Inpatient Plan of Care  Goal: Plan of Care Review  Outcome: Progressing  Flowsheets (Taken 11/14/2024 9773)  Progress: no change  Plan of Care Reviewed With: patient   Goal Outcome Evaluation:  Plan of Care Reviewed With: patient        Progress: no change                      VSS, room air. Patient admitted to the 4th floor from the ED. Patient resting comfortably. No acute events to report, will continue to monitor.

## 2024-11-14 NOTE — H&P
[facility]   HISTORY AND PHYSICAL      Name:  Silva Maldonado   Age:  64 y.o.  Sex:  female  :  1960  MRN:  [id number]   Visit Number:  [id number]  Admission Date:  2024  Date Of Service:  24  Primary Care Physician:  Sharmila Oconnell APRN    Chief Complaint:     Finger swelling, dysuria    History Of Presenting Illness:      Silva Maldonado is a 64-year-old woman with past medical history of type 2 diabetes, diabetic nephropathy, polyarthritis, kidney stones, cholecystectomy, diverticulosis, hypertension, hyperlipidemia.  Presented to [facility] ED on 2024 with concern for area of fluctuance on her right index finger more swollen for 2 days.  Also has had some dysuria.  Did report chest pain day prior to presentation which have resolved.  Did have some chills.  Denied nausea and vomiting, leg pain or swelling, history of PE/DVT.    ED summary: Afebrile, tachycardic which improved, other vital signs stable on room air.  EKG sinus rhythm, nonspecific ST-T wave changes.  High sensitive troponin 19, 21, ACS not suspected.  Blood glucose 269.  CRP 11.53, lactate 2.5, procalcitonin 3.7.  No leukocytosis.  No anemia.  Pattern of infection on urinalysis.  Blood cultures.  Urine culture.  COVID/flu negative.  CXR no acute cardiopulmonary process.  CT ab/pelvis no hydronephrosis or nephrolithiasis, does show enlarged fatty infiltrated liver with splenomegaly but normal portal vein, does show diverticulosis, stable precaval lymph node.  She was provided Rocephin, Benadryl, droperidol, Zofran, 1 L NS bolus.    Review Of Systems:    All systems were reviewed and negative except as mentioned in history of presenting illness, assessment and plan.    Past Medical History: Patient  has a past medical history of Cholelithiasis, Colon polyp (2019), Diabetes mellitus (), Diverticulosis (), Elevated cholesterol, Gestational hypertension (1999), Hypertension,  Kidney stone (8/20/24), Multiple gestation (1999), Obesity, Ovarian cyst (1999), Pelvic prolapse (2001), Preeclampsia (1999), UTI (urinary tract infection) (11/14/2024), and Varicella (1968).    Past Surgical History: Patient  has a past surgical history that includes Cholecystectomy (1998); Tonsillectomy (1983); Colonoscopy (12/27/2019); Knee arthroscopy (Left); Colonoscopy (N/A, 05/04/2023); Vaginal prolapse repair (2006); Laparoscopic cholecystectomy (1998); and Tubal ligation (2006).    Social History: Patient  reports that she quit smoking about 12 years ago. Her smoking use included cigarettes. She started smoking about 38 years ago. She has a 38.4 pack-year smoking history. She has been exposed to tobacco smoke. She has never used smokeless tobacco. She reports that she does not drink alcohol and does not use drugs.    Family History:  Patient's family history has been reviewed and found to be noncontributory.     Allergies:      Patient has no known allergies.    Home Medications:    Prior to Admission Medications       Prescriptions Last Dose Informant Patient Reported? Taking?    Blood Glucose Monitoring Suppl kit   No No    1 each 4 (Four) Times a Day.    fluconazole (Diflucan) 150 MG tablet   No No    Take 1 tablet by mouth Every 72 (Seventy-Two) Hours for 2 doses.    glucose blood (Glucose Meter Test) test strip   No No    Use as instructed    Jardiance 10 MG tablet tablet   No No    TAKE 1 TABLET BY MOUTH DAILY    losartan (COZAAR) 25 MG tablet   No No    Take 1 tablet by mouth Daily.    Magnesium 250 MG tablet   Yes No    Take 1 tablet by mouth At Night As Needed.    nitrofurantoin, macrocrystal-monohydrate, (Macrobid) 100 MG capsule   No No    Take 1 capsule by mouth 2 (Two) Times a Day for 5 days.    phenazopyridine (Pyridium) 200 MG tablet   No No    Take 1 tablet by mouth 3 (Three) Times a Day As Needed for Bladder Spasms for up to 2 days.    Semaglutide, 2 MG/DOSE, (Ozempic, 2 MG/DOSE,) 8 MG/3ML  "solution pen-injector   No No    Inject 2 mg under the skin into the appropriate area as directed 1 (One) Time Per Week.    simvastatin (ZOCOR) 20 MG tablet   No No    Take 1 tablet by mouth Every Evening.          ED Medications:    Medications   lidocaine (XYLOCAINE) 1 % injection 10 mL (10 mL Injection Given by Other 11/14/24 0850)   ondansetron (ZOFRAN) injection 4 mg (4 mg Intravenous Given 11/14/24 0922)   droperidol (INAPSINE) injection 2.5 mg (2.5 mg Intravenous Given 11/14/24 0941)   diphenhydrAMINE (BENADRYL) injection 25 mg (25 mg Intravenous Given 11/14/24 0939)   cefTRIAXone (ROCEPHIN) 2,000 mg in sodium chloride 0.9 % 100 mL IVPB-VTB (0 mg Intravenous Stopped 11/14/24 1241)   sodium chloride 0.9 % bolus 1,000 mL (1,000 mL Intravenous New Bag 11/14/24 1257)     Vital Signs:  Temp:  [97.8 °F (36.6 °C)] 97.8 °F (36.6 °C)  Heart Rate:  [] 104  Resp:  [18] 18  BP: (125-155)/(67-88) 155/77        11/14/24  0759   Weight: 102 kg (224 lb)     Body mass index is 39.68 kg/m².    Physical Exam:     Most recent vital Signs: /77   Pulse 104   Temp 97.8 °F (36.6 °C) (Oral)   Resp 18   Ht 160 cm (63\")   Wt 102 kg (224 lb)   SpO2 92%   BMI 39.68 kg/m²     Physical Exam  Constitutional:       General: She is not in acute distress.     Appearance: She is obese. She is ill-appearing. She is not toxic-appearing.   HENT:      Mouth/Throat:      Mouth: Mucous membranes are moist.   Eyes:      Extraocular Movements: Extraocular movements intact.   Cardiovascular:      Rate and Rhythm: Normal rate and regular rhythm.      Pulses: Normal pulses.      Heart sounds: Normal heart sounds.   Pulmonary:      Effort: Pulmonary effort is normal.      Breath sounds: Normal breath sounds.   Abdominal:      Palpations: Abdomen is soft.      Tenderness: There is no abdominal tenderness.   Musculoskeletal:      Right lower leg: No edema.      Left lower leg: No edema.      Comments: Distal phalanx right index finger " bandaged   Skin:     General: Skin is warm.   Neurological:      General: No focal deficit present.      Mental Status: She is alert and oriented to person, place, and time.   Psychiatric:         Mood and Affect: Mood normal.         Thought Content: Thought content normal.         Laboratory data:    I have reviewed the labs done in the emergency room.    Results from last 7 days   Lab Units 11/14/24  0820   SODIUM mmol/L 135*   POTASSIUM mmol/L 3.9   CHLORIDE mmol/L 101   CO2 mmol/L 22.0   BUN mg/dL 18   CREATININE mg/dL 1.02*   CALCIUM mg/dL 8.9   BILIRUBIN mg/dL 0.8   ALK PHOS U/L 95   ALT (SGPT) U/L 109*   AST (SGOT) U/L 147*   GLUCOSE mg/dL 269*     Results from last 7 days   Lab Units 11/14/24  0820   WBC 10*3/mm3 7.46   HEMOGLOBIN g/dL 13.7   HEMATOCRIT % 40.4   PLATELETS 10*3/mm3 224         Results from last 7 days   Lab Units 11/14/24  1027 11/14/24  0820   HSTROP T ng/L 21* 19*     Results from last 7 days   Lab Units 11/14/24  0820   PROBNP pg/mL 298.0                 Results from last 7 days   Lab Units 11/14/24  0818 11/12/24  1613   COLOR UA  Yellow Yellow   SPECIFIC GRAVITY, URINE   --  1.015   GLUCOSE UA  >=1000 mg/dL (3+)*  --    KETONES UA  15 mg/dL (1+)* Negative   BLOOD UA  Moderate (2+)*  --    LEUKOCYTES UA  Small (1+)* Negative   PH, URINE  6.0 6.0   BILIRUBIN UA  Small (1+)* Negative   UROBILINOGEN UA  1.0 E.U./dL Normal   RBC UA /HPF 0-2  --    WBC UA /HPF Too Numerous to Count*  --        Pain Management Panel          Latest Ref Rng & Units 4/12/2024   Pain Management Panel   Creatinine, Urine Not Estab. mg/dL 136.2       Details                   EKG:      EKG sinus rhythm, nonspecific ST-T wave changes.    Radiology:    CT Abdomen Pelvis Without Contrast    Result Date: 11/14/2024  PROCEDURE: CT ABDOMEN PELVIS WO CONTRAST-  HISTORY: Eval nephrolithiasis  COMPARISON: August 21, 2024..  PROCEDURE: Axial images were obtained from the lung bases to the pubic symphysis by computed  tomography. This study was performed with techniques to keep radiation doses as low as reasonably achievable, (ALARA). Individualized dose reduction techniques using automated exposure control or adjustment of mA and/or kV according to the patient size were employed.  FINDINGS:  ABDOMEN: The lung bases are clear. The heart is proper size. The limited non-contrast images of the liver demonstrate diffuse fatty infiltration without focal mass. Liver is enlarged at 20.8 cm. Portal vein is normal in diameter. There are metallic surgical clips in the right upper quadrant consistent with previous cholecystectomy. The spleen is mildly enlarged at 13 cm. Findings are similar to prior exam. No adrenal masses are seen. The aorta is normal in caliber. No free fluid identified. There is a prominent 2.5 cm precaval lymph node which is stable from the prior exam. Vascular calcifications noted. There is no nephrolithiasis. There is no hydronephrosis. Bilateral perirenal stranding again noted. Small umbilical hernia containing fat noted. Uterus is midline and to the left.  PELVIS: The GI tract demonstrate no obstruction. Cecum is located anteriorly in the right lower quadrant. There is diverticulosis without evidence of diverticulitis. The appendix is not identified; no secondary signs of appendicitis seen. The urinary bladder is unremarkable. There is no fluid or adenopathy.      No hydronephrosis or nephrolithiasis.  Enlarged, fatty infiltrated liver with mild splenomegaly but normal portal vein; findings similar to prior.  Diverticulosis.  Stable precaval lymph node from prior exam.   CTDI: 12.29 mGy DLP:591.12 mGy.cm  This report was signed and finalized on 11/14/2024 11:57 AM by Marycruz Maldonado MD.      XR Chest 1 View    Result Date: 11/14/2024  PROCEDURE: XR CHEST 1 VW-  HISTORY: Chest pain, mid chest pain that goes into shoulders and of the neck for 1 day.  COMPARISON: August 21, 2024..  FINDINGS: The heart is upper limits of  "normal, stable.. The lungs are clear. The mediastinum is unremarkable. There is no pneumothorax.  There are no acute osseous abnormalities. Apical lordotic positioning noted.      No acute cardiopulmonary process.     This report was signed and finalized on 11/14/2024 8:38 AM by Marycruz Maldonado MD.       Assessment/Plan:    Inpatient general floor admission 11/14/2024 with UTI, right finger superficial abscess status post I&D by ED physician, transient encephalopathy.    UTI  Rocephin.  Urine culture.  Encephalopathy, resolved  May be related to UTI, although  at bedside said that it was only transient after she got the droperidol so it may have been related to the medicine.  Right index finger infection  On Rocephin  Status post I&D.  May be incidental but appears to be a \"mole\" which had gotten infected.  No significant erythema surrounding.    Chronic: type 2 diabetes, diabetic nephropathy, polyarthritis, kidney stones, cholecystectomy, diverticulosis, hypertension, hyperlipidemia  Subcutaneous insulin.  Continue other medications.    Risk Assessment: High  DVT Prophylaxis: Lovenox  Code Status: Full code  Diet: Cardiac/diabetic    Advance Care Planning   ACP discussion was held with the patient during this visit. Patient does not have an advance directive, information provided.           Brian Joseph Kerley,   11/14/24  14:03 EST    Dictated utilizing Dragon dictation.  "

## 2024-11-15 ENCOUNTER — READMISSION MANAGEMENT (OUTPATIENT)
Dept: CALL CENTER | Facility: HOSPITAL | Age: 64
End: 2024-11-15
Payer: COMMERCIAL

## 2024-11-15 VITALS
BODY MASS INDEX: 39.84 KG/M2 | WEIGHT: 224.87 LBS | SYSTOLIC BLOOD PRESSURE: 143 MMHG | DIASTOLIC BLOOD PRESSURE: 69 MMHG | OXYGEN SATURATION: 93 % | HEART RATE: 91 BPM | HEIGHT: 63 IN | RESPIRATION RATE: 16 BRPM | TEMPERATURE: 98.3 F

## 2024-11-15 PROBLEM — L08.9 FINGER INFECTION: Status: ACTIVE | Noted: 2024-11-15

## 2024-11-15 LAB
ANION GAP SERPL CALCULATED.3IONS-SCNC: 13.2 MMOL/L (ref 5–15)
BACTERIA BLD CULT: ABNORMAL
BACTERIA SPEC AEROBE CULT: NORMAL
BACTERIA UR CULT: ABNORMAL
BACTERIA UR CULT: ABNORMAL
BASOPHILS # BLD AUTO: 0.03 10*3/MM3 (ref 0–0.2)
BASOPHILS NFR BLD AUTO: 0.4 % (ref 0–1.5)
BOTTLE TYPE: ABNORMAL
BUN SERPL-MCNC: 18 MG/DL (ref 8–23)
BUN/CREAT SERPL: 20.7 (ref 7–25)
CALCIUM SPEC-SCNC: 8.2 MG/DL (ref 8.6–10.5)
CHLORIDE SERPL-SCNC: 100 MMOL/L (ref 98–107)
CO2 SERPL-SCNC: 19.8 MMOL/L (ref 22–29)
CREAT SERPL-MCNC: 0.87 MG/DL (ref 0.57–1)
DEPRECATED RDW RBC AUTO: 37 FL (ref 37–54)
EGFRCR SERPLBLD CKD-EPI 2021: 75.4 ML/MIN/1.73
EOSINOPHIL # BLD AUTO: 0.25 10*3/MM3 (ref 0–0.4)
EOSINOPHIL NFR BLD AUTO: 3.6 % (ref 0.3–6.2)
ERYTHROCYTE [DISTWIDTH] IN BLOOD BY AUTOMATED COUNT: 12.9 % (ref 12.3–15.4)
GLUCOSE SERPL-MCNC: 203 MG/DL (ref 65–99)
HBA1C MFR BLD: 9.3 % (ref 4.8–5.6)
HCT VFR BLD AUTO: 34.7 % (ref 34–46.6)
HGB BLD-MCNC: 11.9 G/DL (ref 12–15.9)
IMM GRANULOCYTES # BLD AUTO: 0.05 10*3/MM3 (ref 0–0.05)
IMM GRANULOCYTES NFR BLD AUTO: 0.7 % (ref 0–0.5)
LYMPHOCYTES # BLD AUTO: 0.41 10*3/MM3 (ref 0.7–3.1)
LYMPHOCYTES NFR BLD AUTO: 5.9 % (ref 19.6–45.3)
MCH RBC QN AUTO: 26.8 PG (ref 26.6–33)
MCHC RBC AUTO-ENTMCNC: 34.3 G/DL (ref 31.5–35.7)
MCV RBC AUTO: 78.2 FL (ref 79–97)
MONOCYTES # BLD AUTO: 0.53 10*3/MM3 (ref 0.1–0.9)
MONOCYTES NFR BLD AUTO: 7.6 % (ref 5–12)
NEUTROPHILS NFR BLD AUTO: 5.72 10*3/MM3 (ref 1.7–7)
NEUTROPHILS NFR BLD AUTO: 81.8 % (ref 42.7–76)
NRBC BLD AUTO-RTO: 0 /100 WBC (ref 0–0.2)
OTHER ANTIBIOTIC SUSC ISLT: ABNORMAL
PLATELET # BLD AUTO: 209 10*3/MM3 (ref 140–450)
PMV BLD AUTO: 10 FL (ref 6–12)
POTASSIUM SERPL-SCNC: 4.1 MMOL/L (ref 3.5–5.2)
RBC # BLD AUTO: 4.44 10*6/MM3 (ref 3.77–5.28)
SODIUM SERPL-SCNC: 133 MMOL/L (ref 136–145)
WBC NRBC COR # BLD AUTO: 6.99 10*3/MM3 (ref 3.4–10.8)

## 2024-11-15 PROCEDURE — 87147 CULTURE TYPE IMMUNOLOGIC: CPT | Performed by: STUDENT IN AN ORGANIZED HEALTH CARE EDUCATION/TRAINING PROGRAM

## 2024-11-15 PROCEDURE — G0378 HOSPITAL OBSERVATION PER HR: HCPCS

## 2024-11-15 PROCEDURE — 99239 HOSP IP/OBS DSCHRG MGMT >30: CPT | Performed by: STUDENT IN AN ORGANIZED HEALTH CARE EDUCATION/TRAINING PROGRAM

## 2024-11-15 PROCEDURE — 83036 HEMOGLOBIN GLYCOSYLATED A1C: CPT | Performed by: STUDENT IN AN ORGANIZED HEALTH CARE EDUCATION/TRAINING PROGRAM

## 2024-11-15 PROCEDURE — 87186 SC STD MICRODIL/AGAR DIL: CPT | Performed by: STUDENT IN AN ORGANIZED HEALTH CARE EDUCATION/TRAINING PROGRAM

## 2024-11-15 PROCEDURE — 87070 CULTURE OTHR SPECIMN AEROBIC: CPT | Performed by: STUDENT IN AN ORGANIZED HEALTH CARE EDUCATION/TRAINING PROGRAM

## 2024-11-15 PROCEDURE — 87205 SMEAR GRAM STAIN: CPT | Performed by: STUDENT IN AN ORGANIZED HEALTH CARE EDUCATION/TRAINING PROGRAM

## 2024-11-15 PROCEDURE — 85025 COMPLETE CBC W/AUTO DIFF WBC: CPT | Performed by: STUDENT IN AN ORGANIZED HEALTH CARE EDUCATION/TRAINING PROGRAM

## 2024-11-15 PROCEDURE — 80048 BASIC METABOLIC PNL TOTAL CA: CPT | Performed by: STUDENT IN AN ORGANIZED HEALTH CARE EDUCATION/TRAINING PROGRAM

## 2024-11-15 PROCEDURE — 87015 SPECIMEN INFECT AGNT CONCNTJ: CPT | Performed by: STUDENT IN AN ORGANIZED HEALTH CARE EDUCATION/TRAINING PROGRAM

## 2024-11-15 RX ORDER — ATORVASTATIN CALCIUM 10 MG/1
10 TABLET, FILM COATED ORAL DAILY
Status: DISCONTINUED | OUTPATIENT
Start: 2024-11-15 | End: 2024-11-15 | Stop reason: HOSPADM

## 2024-11-15 RX ORDER — PHENAZOPYRIDINE HYDROCHLORIDE 200 MG/1
200 TABLET, FILM COATED ORAL 3 TIMES DAILY PRN
Start: 2024-11-15 | End: 2024-11-17

## 2024-11-15 RX ORDER — DEXTROSE MONOHYDRATE 25 G/50ML
25 INJECTION, SOLUTION INTRAVENOUS
Status: DISCONTINUED | OUTPATIENT
Start: 2024-11-15 | End: 2024-11-15 | Stop reason: HOSPADM

## 2024-11-15 RX ORDER — CEPHALEXIN 500 MG/1
500 CAPSULE ORAL 2 TIMES DAILY
Qty: 12 CAPSULE | Refills: 0 | Status: SHIPPED | OUTPATIENT
Start: 2024-11-15 | End: 2024-11-21

## 2024-11-15 RX ORDER — NICOTINE POLACRILEX 4 MG
15 LOZENGE BUCCAL
Status: DISCONTINUED | OUTPATIENT
Start: 2024-11-15 | End: 2024-11-15 | Stop reason: HOSPADM

## 2024-11-15 RX ORDER — DOXYCYCLINE 100 MG/1
100 CAPSULE ORAL 2 TIMES DAILY
Qty: 28 CAPSULE | Refills: 0 | Status: SHIPPED | OUTPATIENT
Start: 2024-11-15 | End: 2024-11-29

## 2024-11-15 RX ORDER — INSULIN LISPRO 100 [IU]/ML
2-9 INJECTION, SOLUTION INTRAVENOUS; SUBCUTANEOUS
Status: DISCONTINUED | OUTPATIENT
Start: 2024-11-15 | End: 2024-11-15 | Stop reason: HOSPADM

## 2024-11-15 RX ORDER — LOSARTAN POTASSIUM 25 MG/1
25 TABLET ORAL DAILY
Status: DISCONTINUED | OUTPATIENT
Start: 2024-11-15 | End: 2024-11-15 | Stop reason: HOSPADM

## 2024-11-15 RX ADMIN — Medication 10 ML: at 09:11

## 2024-11-15 RX ADMIN — LOSARTAN POTASSIUM 25 MG: 25 TABLET, FILM COATED ORAL at 09:09

## 2024-11-15 RX ADMIN — ATORVASTATIN CALCIUM 10 MG: 10 TABLET, FILM COATED ORAL at 09:09

## 2024-11-15 NOTE — OUTREACH NOTE
Prep Survey      Flowsheet Row Responses   [facility] patient discharged from? [place]   Is LACE score < 7 ? No   Eligibility [facility]   Date of Admission 11/15/24   Date of Discharge 11/15/24   Discharge Disposition Home or Self Care   Discharge diagnosis UTI (urinary tract infection)   Does the patient have one of the following disease processes/diagnoses(primary or secondary)? Other   Does the patient have Home health ordered? No   Is there a DME ordered? No   Prep survey completed? Yes            Estephanie EDUARDO - Registered Nurse

## 2024-11-15 NOTE — CONSULTS
"Diabetes Education  Assessment/Teaching    Patient Name:  Silva Maldonado  YOB: 1960  MRN: [id number]  Admit Date:  11/14/2024      Assessment Date:  11/15/2024  Flowsheet Row Most Recent Value   General Information     Height 160 cm (63\")   Height Method Actual   Weight 102 kg (224 lb 13.9 oz)   Weight Method Bed scale   Pregnancy Assessment    Diabetes History    What type of diabetes do you have? Type 2   Length of Diabetes Diagnosis 6 - 10 years   Current DM knowledge good   Do you test your blood sugar at home? --  [not regularly]   Have you had low blood sugar? (<70mg/dl) --  [unknown]   Have you had high blood sugar? (>140mg/dl) yes  [A1c is 9.4%]   When was your last high blood sugar? on admit 269   How would you rate your diabetes control? good  [pt. states that since her last office visit in [place] , she has changed up diet]   Feeling down, depressed, or hopeless Not at all   Little interest or pleasure in doing things Not at all   Education Preferences    What areas of diabetes would you like to learn about? avoiding high blood sugar, diet information, medications for diabetes, testing my blood sugar at home   Nutrition Information    Are you currently following a special meal plan? frequently   Assessment Topics    DM Goals                   Other Comments:  DM education related to A1c >7%.  Pt. Is discharging today.  States that she has made multiple changes in her diet . Reviewed the healthy Plate method.  Pt. Was given BG logs and asked to check BG daily alternating fasting and 2 hours post meal and take to her next provider visit.  Pt. Was given written information on the Healthy Plate Method,   Diabetes Basics\" booklet and BG logs sheets.        Electronically signed by:  Martina Bradford RN  11/15/24 10:59 EST  "

## 2024-11-15 NOTE — PLAN OF CARE
Goal Outcome Evaluation:  Plan of Care Reviewed With: patient        Progress: improving  Outcome Evaluation: VSS. Pain treated with prn medications (see mar). No acute events overnight. Plan of care ongoing.

## 2024-11-15 NOTE — PLAN OF CARE
Goal Outcome Evaluation:            Adequate for care transition to home

## 2024-11-15 NOTE — CASE MANAGEMENT/SOCIAL WORK
Case Management Discharge Note      Final Note: Plans to DC home with     Provided Post Acute Provider List?: N/A  N/A Provider List Comment: Patient plans to return home; no new needs at this time  Provided Post Acute Provider Quality & Resource List?: N/A  N/A Quality & Resource List Comment: Patient plans to return home; no new needs at this time    Selected Continued Care - Admitted Since 11/14/2024       Destination    No services have been selected for the patient.                Durable Medical Equipment    No services have been selected for the patient.                Dialysis/Infusion    No services have been selected for the patient.                Home Medical Care    No services have been selected for the patient.                Therapy    No services have been selected for the patient.                Community Resources    No services have been selected for the patient.                Community & DME    No services have been selected for the patient.                    Transportation Services  Private: Car    Final Discharge Disposition Code: 01 - home or self-care

## 2024-11-15 NOTE — DISCHARGE SUMMARY
[facility]   DISCHARGE SUMMARY      Name:  Silva Maldonado   Age:  64 y.o.  Sex:  female  :  1960  MRN:  [id number]   Visit Number:  [id number]    Admission Date:  2024  Date of Discharge:  11/15/2024  Primary Care Physician:  Sharmila Oconnell APRN        Problem List:     Active Hospital Problems    Diagnosis  POA    **UTI (urinary tract infection) [N39.0]  Yes    Finger infection [L08.9]  Unknown      Resolved Hospital Problems   No resolved problems to display.     Presenting Problem:    Chief Complaint   Patient presents with    Chest Pain    Hand Pain      Consults:     Consulting Physician(s)                     None              Procedures Performed:    none    History of presenting illness/Hospital Course:    Silva Maldonado is a 64-year-old woman with past medical history of type 2 diabetes, diabetic nephropathy, polyarthritis, kidney stones, cholecystectomy, diverticulosis, hypertension, hyperlipidemia.  Presented to [facility] ED on 2024 with concern for area of fluctuance on her right index finger more swollen for 2 days.  Also has had some dysuria.  Did report chest pain day prior to presentation which have resolved.  Did have some chills.  Denied nausea and vomiting, leg pain or swelling, history of PE/DVT.     ED summary: Afebrile, tachycardic which improved, other vital signs stable on room air.  EKG sinus rhythm, nonspecific ST-T wave changes.  High sensitive troponin 19, 21, ACS not suspected.  Blood glucose 269.  CRP 11.53, lactate 2.5, procalcitonin 3.7.  No leukocytosis.  No anemia.  Pattern of infection on urinalysis.  Blood cultures.  Urine culture.  COVID/flu negative.  CXR no acute cardiopulmonary process.  CT ab/pelvis no hydronephrosis or nephrolithiasis, does show enlarged fatty infiltrated liver with splenomegaly but normal portal vein, does show diverticulosis, stable precaval lymph node.  She was provided Rocephin, Benadryl,  "droperidol, Zofran, 1 L NS bolus.    Inpatient general floor admission 11/14/2024 with UTI, right finger superficial abscess status post I&D by ED physician, transient encephalopathy.  Clinically did well overnight.    Shared decision making stable for discharge 11/15/2024.  Discussed possibility of cultures requiring either antibiotic change or possible return to hospital.  Patient and  voiced understanding.  Discussed return precautions.  Follow-up with PCP for finger wound check.    Addendum: After discharge notified by ED physician Dr. De La Cruz he was contacted by lab regarding blood culture positive for Staph aureus.  Contacted patient, will prescribe 14 days of doxycycline.  While it is possible that it may be a contaminant, Staph aureus bacteremia from her finger infection is plausible.  Recommend PCP check follow-up blood cultures.  Clinically patient was doing well at time of discharge, presumed source control given her finger had I&D in ED.  Patient and  voiced understanding of return precautions.    UTI  Continue Keflex.  Rocephin provided during course.  Urine culture.  Encephalopathy, resolved  May be related to UTI, although  at bedside said that it was only transient after she got the droperidol so it may have been related to the medicine.  She exhibited no further symptoms.  Right index finger infection  Continue Keflex.  Received Rocephin.  Attempting to collect culture.  Status post I&D.  May be incidental but appears to be a \"mole\" which had gotten infected.  No significant erythema surrounding.     Chronic: type 2 diabetes, diabetic nephropathy, polyarthritis, kidney stones, cholecystectomy, diverticulosis, hypertension, hyperlipidemia  Continue home medications.    Vital Signs:    Temp:  [98 °F (36.7 °C)-100.9 °F (38.3 °C)] 98.3 °F (36.8 °C)  Heart Rate:  [] 92  Resp:  [16-18] 16  BP: (120-155)/(57-77) 120/64    Physical Exam:    General Appearance:  Alert and cooperative. "    Head:  Atraumatic and normocephalic.   Eyes: Conjunctivae and sclerae normal, no icterus. No pallor.   Ears:  Ears with no abnormalities noted.   Throat: No oral lesions, no thrush, oral mucosa moist.   Neck: Supple, trachea midline, no thyromegaly.   Back:   No kyphoscoliosis present. No tenderness to palpation.   Lungs:   Breath sounds heard bilaterally equally.  No crackles or wheezing. No Pleural rub or bronchial breathing.   Heart:  Normal S1 and S2, no murmur, no gallop, no rub. No JVD.   Abdomen:   Normal bowel sounds, no masses, no organomegaly. Soft, nontender, nondistended, no rebound tenderness.   Extremities: Right hand index finger mildly swollen, slught erythema, some pain with bending   Pulses: Pulses palpable bilaterally.   Skin: warm   Neurologic: Alert and oriented x 3. No facial asymmetry. Moves all four limbs. No tremors.     Pertinent Lab Results:     Results from last 7 days   Lab Units 11/15/24  0717 11/14/24  0820   SODIUM mmol/L 133* 135*   POTASSIUM mmol/L 4.1 3.9   CHLORIDE mmol/L 100 101   CO2 mmol/L 19.8* 22.0   BUN mg/dL 18 18   CREATININE mg/dL 0.87 1.02*   CALCIUM mg/dL 8.2* 8.9   BILIRUBIN mg/dL  --  0.8   ALK PHOS U/L  --  95   ALT (SGPT) U/L  --  109*   AST (SGOT) U/L  --  147*   GLUCOSE mg/dL 203* 269*     Results from last 7 days   Lab Units 11/15/24  0717 11/14/24  0820   WBC 10*3/mm3 6.99 7.46   HEMOGLOBIN g/dL 11.9* 13.7   HEMATOCRIT % 34.7 40.4   PLATELETS 10*3/mm3 209 224         Results from last 7 days   Lab Units 11/14/24  1027 11/14/24  0820   HSTROP T ng/L 21* 19*     Results from last 7 days   Lab Units 11/14/24  0820   PROBNP pg/mL 298.0                 Results from last 7 days   Lab Units 11/14/24  1034 11/14/24  1027   BLOODCX  No growth at less than 24 hours No growth at less than 24 hours       Pertinent Radiology Results:    Imaging Results (All)       Procedure Component Value Units Date/Time    CT Abdomen Pelvis Without Contrast [[id number]] Collected:  11/14/24 1130     Updated: 11/14/24 1159    Narrative:      PROCEDURE: CT ABDOMEN PELVIS WO CONTRAST-     HISTORY: Eval nephrolithiasis     COMPARISON: August 21, 2024..     PROCEDURE: Axial images were obtained from the lung bases to the pubic  symphysis by computed tomography. This study was performed with  techniques to keep radiation doses as low as reasonably achievable,  (ALARA). Individualized dose reduction techniques using automated  exposure control or adjustment of mA and/or kV according to the patient  size were employed.     FINDINGS:     ABDOMEN: The lung bases are clear. The heart is proper size. The limited  non-contrast images of the liver demonstrate diffuse fatty infiltration  without focal mass. Liver is enlarged at 20.8 cm. Portal vein is normal  in diameter. There are metallic surgical clips in the right upper  quadrant consistent with previous cholecystectomy. The spleen is mildly  enlarged at 13 cm. Findings are similar to prior exam. No adrenal masses  are seen. The aorta is normal in caliber. No free fluid identified.  There is a prominent 2.5 cm precaval lymph node which is stable from the  prior exam. Vascular calcifications noted. There is no nephrolithiasis.  There is no hydronephrosis. Bilateral perirenal stranding again noted.  Small umbilical hernia containing fat noted. Uterus is midline and to  the left.     PELVIS: The GI tract demonstrate no obstruction. Cecum is located  anteriorly in the right lower quadrant. There is diverticulosis without  evidence of diverticulitis. The appendix is not identified; no secondary  signs of appendicitis seen. The urinary bladder is unremarkable. There  is no fluid or adenopathy.        Impression:      No hydronephrosis or nephrolithiasis.     Enlarged, fatty infiltrated liver with mild splenomegaly but normal  portal vein; findings similar to prior.     Diverticulosis.     Stable precaval lymph node from prior exam.        CTDI: 12.29  mGy  DLP:591.12 mGy.cm     This report was signed and finalized on 11/14/2024 11:57 AM by Marycruz Maldonado MD.       XR Chest 1 View [[id number]] Collected: 11/14/24 0838     Updated: 11/14/24 0840    Narrative:      PROCEDURE: XR CHEST 1 VW-     HISTORY: Chest pain, mid chest pain that goes into shoulders and of the  neck for 1 day.     COMPARISON: August 21, 2024..     FINDINGS: The heart is upper limits of normal, stable.. The lungs are  clear. The mediastinum is unremarkable. There is no pneumothorax.  There  are no acute osseous abnormalities. Apical lordotic positioning noted.        Impression:      No acute cardiopulmonary process.              This report was signed and finalized on 11/14/2024 8:38 AM by Marycruz Maldonado MD.               Echo:      Condition on Discharge:      Stable.    Code status during the hospital stay:    Code Status and Medical Interventions: CPR (Attempt to Resuscitate); Full Support   Ordered at: 11/14/24 1401     Code Status (Patient has no pulse and is not breathing):    CPR (Attempt to Resuscitate)     Medical Interventions (Patient has pulse or is breathing):    Full Support     Discharge Disposition:    Home or Self Care    Discharge Medications:       Discharge Medications        New Medications        Instructions Start Date   cephalexin 500 MG capsule  Commonly known as: Keflex  Notes to patient: Start 11/15 Bedtime dose   500 mg, Oral, 2 Times Daily             Continue These Medications        Instructions Start Date   Blood Glucose Monitoring Suppl kit   1 each, Not Applicable, 4 Times Daily      glucose blood test strip  Commonly known as: Glucose Meter Test   Use as instructed      Jardiance 10 MG tablet tablet  Generic drug: empagliflozin   10 mg, Oral, Daily      losartan 25 MG tablet  Commonly known as: COZAAR   25 mg, Oral, Daily      Magnesium 250 MG tablet   1 tablet, Nightly PRN      Ozempic (2 MG/DOSE) 8 MG/3ML solution pen-injector  Generic drug: Semaglutide (2  MG/DOSE)   2 mg, Subcutaneous, Weekly      simvastatin 20 MG tablet  Commonly known as: ZOCOR   20 mg, Oral, Every Evening             Stop These Medications      nitrofurantoin (macrocrystal-monohydrate) 100 MG capsule  Commonly known as: Macrobid            ASK your doctor about these medications        Instructions Start Date   phenazopyridine 200 MG tablet  Commonly known as: Pyridium  Ask about: Should I take this medication?   200 mg, Oral, 3 Times Daily PRN             Discharge Diet:     Diet Instructions       Diet: Cardiac Diets, Diabetic Diets; Healthy Heart (2-3 Na+); Regular (IDDSI 7); Thin (IDDSI 0); Consistent Carbohydrate      Discharge Diet:  Cardiac Diets  Diabetic Diets       Cardiac Diet: Healthy Heart (2-3 Na+)    Texture: Regular (IDDSI 7)    Fluid Consistency: Thin (IDDSI 0)    Diabetic Diet: Consistent Carbohydrate          Activity at Discharge:     Activity Instructions       Activity as Tolerated            Follow-up Appointments:     Follow-up Information       Sharmila Oconnell APRN Follow up in 2 day(s).    Specialties: Family Medicine, Nurse Practitioner, Urgent Care, Emergency Medicine  Contact information:  [address] [place] [address]  Sumit Kang [place] [id number] [phone]                           Future Appointments   Date Time Provider Department Center   11/22/2024  4:30 PM [facility]   12/4/2024 10:20 AM Diane Stephens APRN [facility] (   12/9/2024 11:00 AM Ginger San APRN [facility]   1/24/2025  9:15 AM Sharmila Oconnell APRN [facility]     Test Results Pending at Discharge:    Pending Labs       Order Current Status    Urine Culture - Urine, Urine, Clean Catch In process    Blood Culture With JADEN - Blood, Arm, Left Preliminary result    Blood Culture With JADEN - Blood, Hand, Right Preliminary result               Brian Joseph Kerley, DO  11/15/24  09:00 EST    Time: I spent 35 minutes on this discharge activity which included: face-to-face  encounter with the patient, reviewing the data in the system, coordination of the care with the nursing staff as well as consultants, documentation, and entering orders.     Dictated utilizing Dragon dictation.

## 2024-11-16 ENCOUNTER — NURSE TRIAGE (OUTPATIENT)
Dept: CALL CENTER | Facility: HOSPITAL | Age: 64
End: 2024-11-16
Payer: COMMERCIAL

## 2024-11-16 NOTE — TELEPHONE ENCOUNTER
I was d/c from [facility] yesterday I have UTI and infection in finger, finger seems maybe  a little  more red today, to me and is throbbing, and I am on antibioitcs what to do? Started on antibiotics couple days ago, and was changed to another antibiotic Thursday, and yesterday after d/c was called said Cultures had grown bacteria and was changed to Doxycyline, UA for UTI ecoli in Urine. I am not running a fever and have not been running fever. I am not worse just wondering what to do? She states, told her have not been on the antibiotic even 24 hours, to continue taking it, if spikes fever, or gets to feeling worse or any red streaks from finer or increased symptoms, come to ER, otherwise watch wound and take antibiotics as prescribed, follow up PCP  Reason for Disposition   [1] Taking antibiotics < 48 hours AND [2] fever still present (SAME)    Additional Information   Negative: SEVERE difficulty breathing (e.g., struggling for each breath, speaks in single words)   Negative: Sounds like a life-threatening emergency to the triager   Negative: [1] Recent hospitalization for pneumonia AND [2] taking an antibiotic   Negative: [1] Animal bite infection AND [2] taking an antibiotic   Negative: [1] Cellulitis AND [2] taking an antibiotic   Negative: [1] Diarrhea AND [2] taking an antibiotic   Negative: [1] Ear  infection (Otitis Media) AND [2] taking an antibiotic   Negative: [1] Ear  infection (Swimmer's Ear) AND [2] taking an antibiotic   Negative: [1] Sinus infection AND [2] taking an antibiotic   Negative: [1] Strep throat AND [2] taking an antibiotic   Negative: [1] Urinary tract  infection (e.g., cystitis, pyelonephritis, urethritis, epididymitis) AND [2] male AND [3] taking an antibiotic   Negative: [1] Urinary tract  infection (e.g., cystitis, pyelonephritis, urethritis) AND [2] female AND [3] taking an antibiotic   Negative: [1] Wound infection AND [2] taking an antibiotic   Negative: MODERATE difficulty  "breathing (e.g., speaks in phrases, SOB even at rest, pulse 100-120)   Negative: Fever > 103 F (39.4 C)   Negative: Patient sounds very sick or weak to the triager   Negative: [1] Taking antibiotics > 24 hours AND [2] symptoms WORSE   Negative: [1] Recent hospitalization AND [2] symptoms WORSE   Negative: [1] Diabetes mellitus or weak immune system (e.g., HIV positive, cancer chemo, splenectomy, organ transplant, chronic steroids) AND [2] new-onset of fever > 100.0 F (37.8 C)   Negative: [1] Caller has URGENT question AND [2] triager unable to answer question   Negative: [1] Taking antibiotic AND [2] new-onset of fever   Negative: [1] Taking antibiotic > 48 hours (2 days) AND [2] fever still present (SAME)   Negative: [1] Taking antibiotic > 72 hours (3 days) AND [2] symptoms (other than fever) not improved   Negative: [1] Caller has NON-URGENT question AND [2] triager unable to answer question   Negative: [1] Finished taking antibiotics AND [2] symptoms are BETTER but [3] not completely gone   Negative: [1] Taking antibiotic AND [2] symptoms are BETTER AND [3] no fever    Answer Assessment - Initial Assessment Questions  1. INFECTION: \"What infection is the antibiotic being given for?\"      Bacterial finger  2. ANTIBIOTIC: \"What antibiotic are you taking\" \"How many times per day?\"      doxycycline  3. DURATION: \"When was the antibiotic started?\"      yestereday  4. MAIN CONCERN OR SYMPTOM:  \"What is your main concern right now?\"      Finger red  5. BETTER-SAME-WORSE: \"Are you getting better, staying the same, or getting worse compared to when you first started the antibiotics?\" If getting worse, ask: \"In what way?\"       Same, no better  6. FEVER: \"Do you have a fever?\" If Yes, ask: \"What is your temperature, how was it measured, and when did it start?\"      no  7. SYMPTOMS: \"Are there any other symptoms you're concerned about?\" If Yes, ask: \"When did it start?\"      Finger throbbing  8. FOLLOW-UP APPOINTMENT: \"Do " "you have a follow-up appointment with your doctor?\"      no    Protocols used: Infection on Antibiotic Follow-up Call-ADULT-    "

## 2024-11-17 ENCOUNTER — TRANSITIONAL CARE MANAGEMENT TELEPHONE ENCOUNTER (OUTPATIENT)
Dept: CALL CENTER | Facility: HOSPITAL | Age: 64
End: 2024-11-17
Payer: COMMERCIAL

## 2024-11-17 ENCOUNTER — APPOINTMENT (OUTPATIENT)
Dept: GENERAL RADIOLOGY | Facility: HOSPITAL | Age: 64
End: 2024-11-17
Payer: COMMERCIAL

## 2024-11-17 ENCOUNTER — HOSPITAL ENCOUNTER (EMERGENCY)
Facility: HOSPITAL | Age: 64
Discharge: ANOTHER HOSPITAL | End: 2024-11-17
Attending: STUDENT IN AN ORGANIZED HEALTH CARE EDUCATION/TRAINING PROGRAM | Admitting: STUDENT IN AN ORGANIZED HEALTH CARE EDUCATION/TRAINING PROGRAM
Payer: COMMERCIAL

## 2024-11-17 VITALS
RESPIRATION RATE: 18 BRPM | SYSTOLIC BLOOD PRESSURE: 166 MMHG | HEIGHT: 63 IN | BODY MASS INDEX: 40.75 KG/M2 | HEART RATE: 75 BPM | WEIGHT: 230 LBS | DIASTOLIC BLOOD PRESSURE: 98 MMHG | OXYGEN SATURATION: 99 % | TEMPERATURE: 97.8 F

## 2024-11-17 DIAGNOSIS — L03.011 CELLULITIS OF RIGHT INDEX FINGER: Primary | ICD-10-CM

## 2024-11-17 DIAGNOSIS — L03.119 CELLULITIS OF HAND: ICD-10-CM

## 2024-11-17 LAB
ALBUMIN SERPL-MCNC: 3.4 G/DL (ref 3.5–5.2)
ALBUMIN/GLOB SERPL: 1.2 G/DL
ALP SERPL-CCNC: 142 U/L (ref 39–117)
ALT SERPL W P-5'-P-CCNC: 114 U/L (ref 1–33)
ANION GAP SERPL CALCULATED.3IONS-SCNC: 13.6 MMOL/L (ref 5–15)
AST SERPL-CCNC: 23 U/L (ref 1–32)
BACTERIA FLD CULT: ABNORMAL
BACTERIA SPEC AEROBE CULT: ABNORMAL
BASOPHILS # BLD AUTO: 0.08 10*3/MM3 (ref 0–0.2)
BASOPHILS NFR BLD AUTO: 1.3 % (ref 0–1.5)
BILIRUB SERPL-MCNC: 0.5 MG/DL (ref 0–1.2)
BUN SERPL-MCNC: 15 MG/DL (ref 8–23)
BUN/CREAT SERPL: 22.7 (ref 7–25)
CALCIUM SPEC-SCNC: 8.7 MG/DL (ref 8.6–10.5)
CHLORIDE SERPL-SCNC: 103 MMOL/L (ref 98–107)
CO2 SERPL-SCNC: 18.4 MMOL/L (ref 22–29)
CREAT SERPL-MCNC: 0.66 MG/DL (ref 0.57–1)
CRP SERPL-MCNC: 3.08 MG/DL (ref 0–0.5)
D-LACTATE SERPL-SCNC: 0.9 MMOL/L (ref 0.5–2)
DEPRECATED RDW RBC AUTO: 36.5 FL (ref 37–54)
EGFRCR SERPLBLD CKD-EPI 2021: 99.3 ML/MIN/1.73
EOSINOPHIL # BLD AUTO: 0.38 10*3/MM3 (ref 0–0.4)
EOSINOPHIL NFR BLD AUTO: 6.3 % (ref 0.3–6.2)
ERYTHROCYTE [DISTWIDTH] IN BLOOD BY AUTOMATED COUNT: 12.9 % (ref 12.3–15.4)
GLOBULIN UR ELPH-MCNC: 2.8 GM/DL
GLUCOSE SERPL-MCNC: 291 MG/DL (ref 65–99)
GRAM STN SPEC: ABNORMAL
HBA1C MFR BLD: 9.2 % (ref 4.8–5.6)
HCT VFR BLD AUTO: 36.8 % (ref 34–46.6)
HGB BLD-MCNC: 12.6 G/DL (ref 12–15.9)
IMM GRANULOCYTES # BLD AUTO: 0.14 10*3/MM3 (ref 0–0.05)
IMM GRANULOCYTES NFR BLD AUTO: 2.3 % (ref 0–0.5)
ISOLATED FROM: ABNORMAL
LYMPHOCYTES # BLD AUTO: 1.16 10*3/MM3 (ref 0.7–3.1)
LYMPHOCYTES NFR BLD AUTO: 19.2 % (ref 19.6–45.3)
MCH RBC QN AUTO: 26.6 PG (ref 26.6–33)
MCHC RBC AUTO-ENTMCNC: 34.2 G/DL (ref 31.5–35.7)
MCV RBC AUTO: 77.8 FL (ref 79–97)
MONOCYTES # BLD AUTO: 0.43 10*3/MM3 (ref 0.1–0.9)
MONOCYTES NFR BLD AUTO: 7.1 % (ref 5–12)
NEUTROPHILS NFR BLD AUTO: 3.86 10*3/MM3 (ref 1.7–7)
NEUTROPHILS NFR BLD AUTO: 63.8 % (ref 42.7–76)
NRBC BLD AUTO-RTO: 0 /100 WBC (ref 0–0.2)
PLATELET # BLD AUTO: 270 10*3/MM3 (ref 140–450)
PMV BLD AUTO: 9.7 FL (ref 6–12)
POTASSIUM SERPL-SCNC: 3.9 MMOL/L (ref 3.5–5.2)
PROT SERPL-MCNC: 6.2 G/DL (ref 6–8.5)
RBC # BLD AUTO: 4.73 10*6/MM3 (ref 3.77–5.28)
SODIUM SERPL-SCNC: 135 MMOL/L (ref 136–145)
WBC NRBC COR # BLD AUTO: 6.05 10*3/MM3 (ref 3.4–10.8)

## 2024-11-17 PROCEDURE — 83605 ASSAY OF LACTIC ACID: CPT | Performed by: PHYSICIAN ASSISTANT

## 2024-11-17 PROCEDURE — 73130 X-RAY EXAM OF HAND: CPT

## 2024-11-17 PROCEDURE — 36415 COLL VENOUS BLD VENIPUNCTURE: CPT

## 2024-11-17 PROCEDURE — 25010000002 VANCOMYCIN 5 G RECONSTITUTED SOLUTION: Performed by: PHYSICIAN ASSISTANT

## 2024-11-17 PROCEDURE — 86140 C-REACTIVE PROTEIN: CPT | Performed by: PHYSICIAN ASSISTANT

## 2024-11-17 PROCEDURE — 83036 HEMOGLOBIN GLYCOSYLATED A1C: CPT | Performed by: PHYSICIAN ASSISTANT

## 2024-11-17 PROCEDURE — 63710000001 ONDANSETRON ODT 4 MG TABLET DISPERSIBLE: Performed by: STUDENT IN AN ORGANIZED HEALTH CARE EDUCATION/TRAINING PROGRAM

## 2024-11-17 PROCEDURE — 80053 COMPREHEN METABOLIC PANEL: CPT | Performed by: PHYSICIAN ASSISTANT

## 2024-11-17 PROCEDURE — 96366 THER/PROPH/DIAG IV INF ADDON: CPT

## 2024-11-17 PROCEDURE — 87040 BLOOD CULTURE FOR BACTERIA: CPT | Performed by: STUDENT IN AN ORGANIZED HEALTH CARE EDUCATION/TRAINING PROGRAM

## 2024-11-17 PROCEDURE — 99285 EMERGENCY DEPT VISIT HI MDM: CPT | Performed by: STUDENT IN AN ORGANIZED HEALTH CARE EDUCATION/TRAINING PROGRAM

## 2024-11-17 PROCEDURE — 25810000003 SODIUM CHLORIDE 0.9 % SOLUTION: Performed by: PHYSICIAN ASSISTANT

## 2024-11-17 PROCEDURE — 85025 COMPLETE CBC W/AUTO DIFF WBC: CPT | Performed by: PHYSICIAN ASSISTANT

## 2024-11-17 PROCEDURE — 96365 THER/PROPH/DIAG IV INF INIT: CPT

## 2024-11-17 RX ORDER — VANCOMYCIN 2 GRAM/500 ML IN 0.9 % SODIUM CHLORIDE INTRAVENOUS
2000 ONCE
Status: COMPLETED | OUTPATIENT
Start: 2024-11-17 | End: 2024-11-17

## 2024-11-17 RX ORDER — ONDANSETRON 4 MG/1
4 TABLET, ORALLY DISINTEGRATING ORAL ONCE
Status: COMPLETED | OUTPATIENT
Start: 2024-11-17 | End: 2024-11-17

## 2024-11-17 RX ORDER — HYDROCODONE BITARTRATE AND ACETAMINOPHEN 5; 325 MG/1; MG/1
1 TABLET ORAL ONCE
Status: COMPLETED | OUTPATIENT
Start: 2024-11-17 | End: 2024-11-17

## 2024-11-17 RX ADMIN — VANCOMYCIN HYDROCHLORIDE 2000 MG: 5 INJECTION, POWDER, LYOPHILIZED, FOR SOLUTION INTRAVENOUS at 10:09

## 2024-11-17 RX ADMIN — HYDROCODONE BITARTRATE AND ACETAMINOPHEN 1 TABLET: 5; 325 TABLET ORAL at 12:33

## 2024-11-17 RX ADMIN — ONDANSETRON 4 MG: 4 TABLET, ORALLY DISINTEGRATING ORAL at 12:33

## 2024-11-17 NOTE — OUTREACH NOTE
Call Center TCM Note      Flowsheet Row Responses   [facility] patient discharged from? [facility]   Does the patient have one of the following disease processes/diagnoses(primary or secondary)? Other   TCM attempt successful? No   Unsuccessful attempts Attempt 1  [Pt. currently in the ED.]            Jeni Rangel RN    11/17/2024, 10:53 EST

## 2024-11-17 NOTE — ED NOTES
Per BILL Alvarado request, [facility]. Awaiting call back

## 2024-11-17 NOTE — ED PROVIDER NOTES
Subjective:  Chief Complaint:  Finger infection    History of Present Illness:  Patient is a 64-year-old female with history of type 2 diabetes, hypercholesterolemia, hypertension, among others presenting to the ER with complaints of infection to right index finger.  Patient was seen on 11- and admitted for UTI and finger infection after I&D was performed in the ER and wound culture obtained.  Wound culture grew moderate Staph aureus.  Patient was subsequently given IV Rocephin and admitted to the hospitalist.  Patient was discharged the next day on Keflex.  Wound culture and 1 blood culture ended up growing MRSA.  Patient was contacted and antibiotic switched to doxycycline.  She has taken 5 doses but states that the redness and warmth has traveled up her finger to her hand.  Patient states she has been taking Tylenol, unsure if she has a fever.  Denies additional symptoms or complaints at this time.      Nurses Notes reviewed and agree, including vitals, allergies, social history and prior medical history.     REVIEW OF SYSTEMS: All systems reviewed and not pertinent unless noted.  Review of Systems   Skin:  Positive for wound.        Infection to right index finger   All other systems reviewed and are negative.      Past Medical History:   Diagnosis Date    Cholelithiasis     Removed in 1996    Colon polyp 12/2019    Recheck i 3 years    Diabetes mellitus 2017    type II    Diverticulosis 2007    Elevated cholesterol     Gestational hypertension 9/1999    Hypertension     Kidney stone 8/20/24    Multiple gestation 1999    1 still born, 2 live burths    Obesity     Ovarian cyst 1999    Pelvic prolapse 2001    Preeclampsia 1999    UTI (urinary tract infection) 11/14/2024    Varicella 1968       Allergies:    Patient has no known allergies.      Past Surgical History:   Procedure Laterality Date    CHOLECYSTECTOMY  1998    COLONOSCOPY  12/27/2019    COLONOSCOPY N/A 05/04/2023    Procedure: COLONOSCOPY with  "polypectomy x 5;  Surgeon: Breanne Smith MD;  Location: [facility] ENDOSCOPY;  Service: Gastroenterology;  Laterality: N/A;    KNEE ARTHROSCOPY Left     LAPAROSCOPIC CHOLECYSTECTOMY      TONSILLECTOMY      TUBAL ABDOMINAL LIGATION      VAGINAL PROLAPSE REPAIR           Social History     Socioeconomic History    Marital status:    Tobacco Use    Smoking status: Former     Current packs/day: 0.00     Average packs/day: 1.5 packs/day for 25.6 years (38.4 ttl pk-yrs)     Types: Cigarettes     Start date: 1986     Quit date: 2012     Years since quittin.8     Passive exposure: Past    Smokeless tobacco: Never   Vaping Use    Vaping status: Never Used   Substance and Sexual Activity    Alcohol use: Never    Drug use: Never    Sexual activity: Yes     Partners: Male     Birth control/protection: Post-menopausal, Tubal ligation         Family History   Problem Relation Age of Onset    Cervical cancer Mother     Hyperlipidemia Mother     Stroke Mother     Hypertension Mother     Cancer Father         Brain tumor - unsure if primary or not    Hyperlipidemia Father     Arthritis Brother     Lung cancer Brother     Heart disease Brother     Hyperlipidemia Brother     Hypertension Brother     Diabetes Brother     Hyperlipidemia Brother     Kidney disease Brother     Hypertension Brother     Heart disease Brother     Hyperlipidemia Brother     Hypertension Brother     Hyperlipidemia Brother     Celiac disease Daughter     Fibromyalgia Daughter     Sjogren's syndrome Daughter     Colon cancer Neg Hx     Breast cancer Neg Hx        Objective  Physical Exam:  /85   Pulse 75   Temp 97.8 °F (36.6 °C)   Resp 18   Ht 160 cm (63\")   Wt 104 kg (230 lb)   SpO2 98%   BMI 40.74 kg/m²      Physical Exam  Vitals and nursing note reviewed.   Constitutional:       General: She is not in acute distress.     Appearance: She is not toxic-appearing.   HENT:      Head: Normocephalic and " atraumatic.      Right Ear: External ear normal.      Left Ear: External ear normal.      Nose: Nose normal.   Eyes:      Extraocular Movements: Extraocular movements intact.      Conjunctiva/sclera: Conjunctivae normal.   Cardiovascular:      Rate and Rhythm: Normal rate.   Pulmonary:      Effort: Pulmonary effort is normal. No respiratory distress.   Abdominal:      General: There is no distension.      Palpations: Abdomen is soft.   Musculoskeletal:         General: Normal range of motion.      Cervical back: Normal range of motion and neck supple.      Comments: Right hand: 2+ pulses, wound from I&D appears to have healed and finger appears cellulitic with erythema, warmth, tenderness, decreased range of motion, erythema extending up the hand   Skin:     General: Skin is warm and dry.   Neurological:      General: No focal deficit present.      Mental Status: She is alert and oriented to person, place, and time.   Psychiatric:         Mood and Affect: Mood normal.         Behavior: Behavior normal.         Procedures    ED Course:         Lab Results (last 24 hours)       Procedure Component Value Units Date/Time    CBC & Differential [[id number]]  (Abnormal) Collected: 11/17/24 0933    Specimen: Blood Updated: 11/17/24 0942    Narrative:      The following orders were created for panel order CBC & Differential.  Procedure                               Abnormality         Status                     ---------                               -----------         ------                     CBC Auto Differential[[id number]]        Abnormal            Final result                 Please view results for these tests on the individual orders.    Comprehensive Metabolic Panel [[id number]]  (Abnormal) Collected: 11/17/24 0933    Specimen: Blood Updated: 11/17/24 1000     Glucose 291 mg/dL      Comment: Glucose >180, Hemoglobin A1C recommended.        BUN 15 mg/dL      Creatinine 0.66 mg/dL      Sodium 135 mmol/L      Potassium  3.9 mmol/L      Chloride 103 mmol/L      CO2 18.4 mmol/L      Calcium 8.7 mg/dL      Total Protein 6.2 g/dL      Albumin 3.4 g/dL      ALT (SGPT) 114 U/L      AST (SGOT) 23 U/L      Alkaline Phosphatase 142 U/L      Total Bilirubin 0.5 mg/dL      Globulin 2.8 gm/dL      A/G Ratio 1.2 g/dL      BUN/Creatinine Ratio 22.7     Anion Gap 13.6 mmol/L      eGFR 99.3 mL/min/1.73     Narrative:      GFR Normal >60  Chronic Kidney Disease <60  Kidney Failure <15      Lactic Acid, Plasma [[id number]]  (Normal) Collected: 11/17/24 0933    Specimen: Blood Updated: 11/17/24 0959     Lactate 0.9 mmol/L     C-reactive Protein [[id number]]  (Abnormal) Collected: 11/17/24 0933    Specimen: Blood Updated: 11/17/24 1000     C-Reactive Protein 3.08 mg/dL     CBC Auto Differential [[id number]]  (Abnormal) Collected: 11/17/24 0933    Specimen: Blood Updated: 11/17/24 0942     WBC 6.05 10*3/mm3      RBC 4.73 10*6/mm3      Hemoglobin 12.6 g/dL      Hematocrit 36.8 %      MCV 77.8 fL      MCH 26.6 pg      MCHC 34.2 g/dL      RDW 12.9 %      RDW-SD 36.5 fl      MPV 9.7 fL      Platelets 270 10*3/mm3      Neutrophil % 63.8 %      Lymphocyte % 19.2 %      Monocyte % 7.1 %      Eosinophil % 6.3 %      Basophil % 1.3 %      Immature Grans % 2.3 %      Neutrophils, Absolute 3.86 10*3/mm3      Lymphocytes, Absolute 1.16 10*3/mm3      Monocytes, Absolute 0.43 10*3/mm3      Eosinophils, Absolute 0.38 10*3/mm3      Basophils, Absolute 0.08 10*3/mm3      Immature Grans, Absolute 0.14 10*3/mm3      nRBC 0.0 /100 WBC     Blood Culture With JADEN - Blood, Arm, Left [[id number]] Collected: 11/17/24 0933    Specimen: Blood from Arm, Left Updated: 11/17/24 0939    Blood Culture With JADEN - Blood, Arm, Left [[id number]] Collected: 11/17/24 0933    Specimen: Blood from Arm, Left Updated: 11/17/24 0939    Hemoglobin A1c [[id number]]  (Abnormal) Collected: 11/17/24 0933    Specimen: Blood Updated: 11/17/24 1032     Hemoglobin A1C 9.20 %     Narrative:      Hemoglobin A1C  Ranges:    Increased Risk for Diabetes  5.7% to 6.4%  Diabetes                     >= 6.5%  Diabetic Goal                < 7.0%             XR Hand 3+ View Right    Result Date: 11/17/2024  PROCEDURE: XR HAND 3+ VW RIGHT-  History: index finger infection  COMPARISON: None.  FINDINGS:  A 3 view exam demonstrates no displaced fracture or dislocation. Minimal degenerative changes. No definite osseous erosions. Mild soft tissue swelling noted. Consider MRI if symptoms persist.      Impression: No acute osseous changes.     This report was signed and finalized on 11/17/2024 10:42 AM by Maxi Brown DO.          MDM  Patient is a 64-year-old female with history of type II diabetes evaluated in the ER for ongoing finger infection to right hand, index finger.  Patient hemodynamically stable, afebrile, nontoxic-appearing on exam.  Recently admitted and given Rocephin, discharged on Keflex for a purulent finger infection.  Had I&D in the ER prior to admission.  Wound culture and wound blood culture grew Staph aureus.  Patient was called and antibiotic switched to doxycycline.  She has had 5 doses of doxycycline but infection getting worse.  Differential diagnosis includes but not limited to abscess, cellulitis, tenosynovitis, among others.  On exam, patient has swelling extending up to the hand slightly from the right index finger, decreased range of motion, erythema, warmth.  No fusiform swelling.  Finger held in extension.  Does not appear consistent with flexor tenosynovitis.  Initial plan included x-ray, CBC, CMP, CRP, lactate, blood cultures.  Will start vancomycin based on blood and wound culture susceptibility.  Was planning to possibly re-open the area as the wound from I&D a few days ago has closed and discuss with hospitalist about possible admission.  However, hospitalist messaged about the patient and states that they need to be transferred to higher level of care to a facility that has a hand specialist.  Upon  completion of labs and x-ray, will discuss with .    Labs remarkable for CRP of 3.08, glucose 291.  Added hemoglobin A1c which was 9.2.  White blood cell count normal.  Lactate normal.  Blood cultures in process.  X-ray without any obvious bony destruction per radiology and per my interpretation.  Spoke with  transfer physician who has accepted the patient for transfer.  They are trying to decide whether the patient will go to [facility] versus [facility].  Reported they will call back in a few minutes.    Patient transferred to  for further evaluation and management.    Final diagnoses:   Cellulitis of right index finger   Cellulitis of hand          Jenny Torres, BILL  11/17/24 1121

## 2024-11-17 NOTE — ED NOTES
Called [facility] att to follow up regarding which facility pt has been accepted. [facility] stated pt has been accepted by Dr. Sanford to [facility] ED. TT notified

## 2024-11-18 ENCOUNTER — TRANSITIONAL CARE MANAGEMENT TELEPHONE ENCOUNTER (OUTPATIENT)
Dept: CALL CENTER | Facility: HOSPITAL | Age: 64
End: 2024-11-18
Payer: COMMERCIAL

## 2024-11-18 NOTE — OUTREACH NOTE
Call Center TCM Note      Flowsheet Row Responses   [facility] patient discharged from? [facility]   Does the patient have one of the following disease processes/diagnoses(primary or secondary)? Other   TCM attempt successful? No   Unsuccessful attempts Attempt 2   Change in Health Status Readmitted  [Patient has been admitted to ]   Comments Hospital follow up appt with PCP ROSY Cruz for 11/20   Does the patient have an appointment with their PCP within 7-14 days of discharge? Yes            Barbara Aguilar RN    11/18/2024, 08:24 EST

## 2024-11-18 NOTE — OUTREACH NOTE
Call Center TCM Note      Flowsheet Row Responses   [facility] patient discharged from? [facility]   Does the patient have one of the following disease processes/diagnoses(primary or secondary)? Other   TCM attempt successful? No   Unsuccessful attempts Attempt 2   Change in Health Status Readmitted  [Patient has been readmitted to ]   Comments Hospital follow up appt with PCP ROSY Cruz for 11/20   Does the patient have an appointment with their PCP within 7-14 days of discharge? Yes            Barbara Aguilar RN    11/18/2024, 08:23 EST

## 2024-11-19 LAB — BACTERIA SPEC AEROBE CULT: NORMAL

## 2024-11-21 ENCOUNTER — READMISSION MANAGEMENT (OUTPATIENT)
Dept: CALL CENTER | Facility: HOSPITAL | Age: 64
End: 2024-11-21
Payer: COMMERCIAL

## 2024-11-21 NOTE — OUTREACH NOTE
Prep Survey      Flowsheet Row Responses   [facility] facility patient discharged from? Non-[facility]   Is LACE score < 7 ? Non-BH Discharge   Eligibility [facility]   Date of Admission 11/17/24   Date of Discharge 11/21/24   Discharge Disposition Home or Self Care   Discharge diagnosis Cellulitis of finger of right hand (Primary Dx)  [Beside I & D by Hand Plastics Team]   Does the patient have one of the following disease processes/diagnoses(primary or secondary)? General Surgery   Does the patient have Home health ordered? No   Is there a DME ordered? No   Medication alerts for this patient Oxycodone, Zyvox, Benadryl Cream   Prep survey completed? Yes            Kenna RYAN - Registered Nurse

## 2024-11-22 ENCOUNTER — TRANSITIONAL CARE MANAGEMENT TELEPHONE ENCOUNTER (OUTPATIENT)
Dept: CALL CENTER | Facility: HOSPITAL | Age: 64
End: 2024-11-22
Payer: COMMERCIAL

## 2024-11-22 LAB
BACTERIA SPEC AEROBE CULT: NORMAL
BACTERIA SPEC AEROBE CULT: NORMAL

## 2024-11-22 NOTE — OUTREACH NOTE
Call Center TCM Note      Flowsheet Row Responses   [facility] patient discharged from? Non-  [[facility]]   Does the patient have one of the following disease processes/diagnoses(primary or secondary)? General Surgery   TCM attempt successful? Yes  [VR for Jose jose Jodi Maldonado]   Call start time 1429   Call end time 1435   Discharge diagnosis Cellulitis of finger of right hand (Primary Dx)   Medication alerts for this patient Oxycodone, Zyvox, Benadryl Cream--   Meds reviewed with patient/caregiver? Yes   Is the patient having any side effects they believe may be caused by any medication additions or changes? No   Does the patient have all medications ordered at discharge? Yes   Is the patient taking all medications as directed (includes completed medication regime)? Yes   Comments Hospital follow up appt with PCP ROSY Cruz for 12/3/24   Does the patient have an appointment with their PCP within 7-14 days of discharge? Yes   Has home health visited the patient within 72 hours of discharge? N/A   Psychosocial issues? No   Did the patient receive a copy of their discharge instructions? Yes   Nursing interventions Reviewed instructions with patient   What is the patient's perception of their health status since discharge? Same  [Reports still some redness to area but no streaking, no drainage.  Aware to monitor for worsening s/s infection (reviewed).  Pt redressing qd.  Has f/u with hand specialist next week.]   Is the patient/caregiver able to teach back signs and symptoms related to disease process for when to call PCP? Yes   Is the patient/caregiver able to teach back signs and symptoms related to disease process for when to call 911? Yes   TCM call completed? Yes   Wrap up additional comments Pt has no questions for this RN today.  Appts scheduled.   Call end time 1435            Socorro Moses RN    11/22/2024, 14:35 EST

## 2024-12-03 ENCOUNTER — OFFICE VISIT (OUTPATIENT)
Dept: FAMILY MEDICINE CLINIC | Facility: CLINIC | Age: 64
End: 2024-12-03
Payer: COMMERCIAL

## 2024-12-03 VITALS
OXYGEN SATURATION: 98 % | RESPIRATION RATE: 16 BRPM | BODY MASS INDEX: 39.83 KG/M2 | DIASTOLIC BLOOD PRESSURE: 82 MMHG | HEIGHT: 63 IN | SYSTOLIC BLOOD PRESSURE: 124 MMHG | WEIGHT: 224.8 LBS | TEMPERATURE: 98 F | HEART RATE: 76 BPM

## 2024-12-03 DIAGNOSIS — M79.644 PAIN IN FINGER OF RIGHT HAND: ICD-10-CM

## 2024-12-03 DIAGNOSIS — L03.011 CELLULITIS OF RIGHT INDEX FINGER: Primary | ICD-10-CM

## 2024-12-03 DIAGNOSIS — M79.89 SWELLING OF RIGHT INDEX FINGER: ICD-10-CM

## 2024-12-03 PROCEDURE — 99495 TRANSJ CARE MGMT MOD F2F 14D: CPT | Performed by: NURSE PRACTITIONER

## 2024-12-03 RX ORDER — DIPHENHYDRAMINE HYDROCHLORIDE, ZINC ACETATE 2; .1 G/100G; G/100G
CREAM TOPICAL
COMMUNITY
Start: 2024-11-21 | End: 2024-12-09

## 2024-12-03 RX ORDER — FENOFIBRATE 145 MG/1
145 TABLET, COATED ORAL DAILY
COMMUNITY
Start: 2024-10-22

## 2024-12-03 RX ORDER — SEMAGLUTIDE 2.68 MG/ML
2 INJECTION, SOLUTION SUBCUTANEOUS
COMMUNITY
Start: 2024-10-18

## 2024-12-03 RX ORDER — SIMVASTATIN 20 MG
20 TABLET ORAL DAILY
COMMUNITY
Start: 2024-07-28 | End: 2024-12-03

## 2024-12-03 RX ORDER — LORAZEPAM 1 MG/1
TABLET ORAL
COMMUNITY
Start: 2024-11-20 | End: 2024-12-09

## 2024-12-03 RX ORDER — LINEZOLID 600 MG/1
600 TABLET, FILM COATED ORAL EVERY 12 HOURS
COMMUNITY
Start: 2024-11-21 | End: 2024-12-05

## 2024-12-03 NOTE — PROGRESS NOTES
"Transitional Care Follow Up Visit  Subjective     Silva Maldonado is a 64 y.o. female who presents for a transitional care management visit.    Within 48 business hours after discharge our office contacted her via telephone to coordinate her care and needs.      I reviewed and discussed the details of that call along with the discharge summary, hospital problems, inpatient lab results, inpatient diagnostic studies, and consultation reports with Silva.     Current outpatient and discharge medications have been reconciled for the patient.  Reviewed by: ROSY Cruz          11/21/2024     4:58 PM   Date of TCM Phone Call   [facility]   Date of Admission 11/17/2024   Date of Discharge 11/21/2024   Discharge Disposition Home or Self Care     Risk for Readmission (LACE) No data recorded    History of Present Illness  Recent hospitalization for cellulitis right index finger.     Received IV vancomycin, ancef in hospital. Was discharged with rx for oral zyvox. Not tolerating well. Consistent nausea, dizziness.     MRI right hand scheduled tomorrow. Follow up with ID on Thursday. Was seen by hand surgeon last week.     Pain, decreased ROM due to swelling. States that the only thing that hurts at this time is the distal joint of the right index finger.     Denies numbness but states that the sensation is \"different\" and \"weird\", decreased sensation at the tip of the finger.     Has been alternating tylenol and IBU OTC but has minimal relief. States that she \"cannot get ahead of the pain\".      Course During Hospital Stay:  Chief Concern, Brief History of Present Illness, and Hospital Course  Silva Maldonado is a 64 y.o. female with a PMH of htn, hld, DM2 who presents from OSH with MRSA bacteremia. She initially presented to her PCP on 11/12 with dysuria and was treated for UTI with macrobid. Patient had progressively worsening redness and edema of right index finger where she presented to " [facility] on 11/14. There she had a bedside I&D and had wound and blood cultures sent. Patients cultures grew staph aureus and she was prescribed oral doxycycline which was taken through 11/17. Despite antibiotics her finger continued to become more red and painful and she re-presented to [facility] where she was given IV vancomycin and transferred to .     On arrival to  ED she was afebrile, well appearing, and her main complaint was pain in her right index finger. Patient received an X-ray which showed no osseous abnormalities. We redrew cultures on arrival. On 11/18 [facility] contacted our team that the blood cultures were MSSA. Patient was switched from IV vancomycin to ancef per these culture results. Patients MRI of hand performed on 11/18 showed possible reactive or tenosynovitis. Hand plastics was consulted, they performed a bedside I&D, packed the would with Nugauze packing. Wound cultures were sent which grew Staph Aureus. Hand and ID agreed with antibiotic choice per blood cultures.     On 11/19 ID requested new blood cultures be drawn. Both admission cultures and cultures from 11/19 show no growth to date. Patient continues to improve on antibiotics and endorses minimal pain at incision site. Based on negative cultures ID agreed to send the patient home on PO linezolid to finish the 14 day antibiotic course. Patient to have repeat MRI w/ and w/o contrast of her hand on before her follow up with Dr. Spencer on 12/5 at 4 PM outpatient. Plastics hand team provided patient education on wound care and packing.     #MSSA bacteremia likely due to right index finger soft tissue infection  - Worsening right index redness, edema, with purulent output s/p I&D 11/14. Blood culture from 11/14 growing MRSA in one bottle by 11/15. Informal discussion with Hand in ED who determined no acute intervention need. Low concern for nec fasc. OSH blood cutlures with MSSA (MecA not detected),   -  "Intervention: Stopped IV Vanc, changed to Ancef per cultures showing MSSA, abscess culture showed gram positive cocci in pairs. Plastics hand opened finger at bedside 11/19 recommend elevation and BID dressing changes; MRI showed possible reactive or tenosynovitis, cannot exclude osteomyelitis. Cultures obtained here with NGTD. Echo obtained unremarkable, no vegetations appreciable on TTE, abscess cultures grew moderate Staph aureus  - End Result: PO linezolid 600mg BID at discharge to continue until 12/5, repeat MRI 12/4 (ID attending sent Lorazepam for pre-procedure sedation) follow-up with ID dr. Spencer 12/5, Follow-up with Plastic 11/27 for wound check      The following portions of the patient's history were reviewed and updated as appropriate: allergies, current medications, past family history, past medical history, past social history, past surgical history, and problem list.    Review of Systems   Musculoskeletal:  Positive for arthralgias and joint swelling.   Skin:  Positive for color change and wound.   Neurological:  Positive for numbness. Negative for weakness.       Objective   /82   Pulse 76   Temp 98 °F (36.7 °C) (Axillary)   Resp 16   Ht 160 cm (63\")   Wt 102 kg (224 lb 12.8 oz)   SpO2 98%   BMI 39.82 kg/m²   Physical Exam  Vitals and nursing note reviewed.   HENT:      Mouth/Throat:      Lips: Pink.   Eyes:      General: Lids are normal.   Cardiovascular:      Rate and Rhythm: Normal rate and regular rhythm.      Pulses: Normal pulses.      Heart sounds: Normal heart sounds.   Pulmonary:      Effort: Pulmonary effort is normal.      Breath sounds: Normal breath sounds.   Musculoskeletal:      Right hand: Swelling, tenderness and bony tenderness present. Decreased range of motion. Decreased strength. Decreased sensation. Normal capillary refill. Normal pulse.        Arms:    Skin:     General: Skin is warm.      Capillary Refill: Capillary refill takes less than 2 seconds.      " Findings: Erythema and wound present.   Neurological:      Mental Status: She is alert and oriented to person, place, and time.      Gait: Gait is intact.   Psychiatric:         Attention and Perception: Attention normal.         Mood and Affect: Mood and affect normal.         Speech: Speech normal.         Behavior: Behavior normal. Behavior is cooperative.         Assessment & Plan   Diagnoses and all orders for this visit:    1. Cellulitis of right index finger (Primary)    2. Swelling of right index finger    3. Pain in finger of right hand    Patient to take Tylenol and IBU simultaneously every 8-12 hours PRN pain if alternating does not provide relief.     Follow up with ID and hand as scheduled.    MRI tomorrow as scheduled.    Continue zyvox therapy pending follow up with ID.     Patient was encouraged to keep me informed of any acute changes, lack of improvement, or any new concerning symptoms.

## 2024-12-09 ENCOUNTER — OFFICE VISIT (OUTPATIENT)
Dept: GASTROENTEROLOGY | Facility: CLINIC | Age: 64
End: 2024-12-09
Payer: COMMERCIAL

## 2024-12-09 VITALS
BODY MASS INDEX: 39.86 KG/M2 | DIASTOLIC BLOOD PRESSURE: 110 MMHG | WEIGHT: 225 LBS | OXYGEN SATURATION: 98 % | SYSTOLIC BLOOD PRESSURE: 170 MMHG | HEART RATE: 93 BPM

## 2024-12-09 DIAGNOSIS — E66.812 CLASS 2 SEVERE OBESITY DUE TO EXCESS CALORIES WITH SERIOUS COMORBIDITY AND BODY MASS INDEX (BMI) OF 39.0 TO 39.9 IN ADULT: Chronic | ICD-10-CM

## 2024-12-09 DIAGNOSIS — Z86.0100 PERSONAL HISTORY OF COLON POLYPS, UNSPECIFIED: ICD-10-CM

## 2024-12-09 DIAGNOSIS — K75.81 NASH (NONALCOHOLIC STEATOHEPATITIS): Primary | Chronic | ICD-10-CM

## 2024-12-09 DIAGNOSIS — R16.0 HEPATOMEGALY: Chronic | ICD-10-CM

## 2024-12-09 DIAGNOSIS — E66.01 CLASS 2 SEVERE OBESITY DUE TO EXCESS CALORIES WITH SERIOUS COMORBIDITY AND BODY MASS INDEX (BMI) OF 39.0 TO 39.9 IN ADULT: Chronic | ICD-10-CM

## 2024-12-09 DIAGNOSIS — K76.0 FATTY (CHANGE OF) LIVER, NOT ELSEWHERE CLASSIFIED: Chronic | ICD-10-CM

## 2024-12-09 PROCEDURE — 99214 OFFICE O/P EST MOD 30 MIN: CPT | Performed by: NURSE PRACTITIONER

## 2024-12-09 NOTE — PROGRESS NOTES
Follow Up Note     Date: 2024   Patient Name: Silva Maldonado  MRN: [id number]  : 1960     Primary Care Provider: Sharmila Oconnell APRN     Chief Complaint   Patient presents with    Fatty Liver     2024  History of Present Illness  The patient is a 64-year-old female here for the evaluation of fatty liver disease.    She was previously hospitalized due to a urinary tract infection (UTI), during which imaging revealed fatty liver disease. She has a history of fatty liver in the past.  She has been making efforts to manage her condition through diet and exercise. However, her progress was interrupted by a recent bout of urinary tract infections and cellulitis. No significant GI problems or concerns at this time. Denies GI bleeding.      Interval History:  2023  Silva Maldonado is a 62 y.o. female who is here today for follow up after colonoscopy. She did not have any problems after her colonoscopy. Upon review of records, she has a history of mild elevation of alk phos level for a few years. No personal or family history of liver disease. No history of IVDA, alcohol use, tattoos or blood transfusions.      3/2/2023  The patient denies recent change in bowel habits. There is no diarrhea or constipation. There is no history of abdominal pain. There is no history of overt GI bleed (hematemesis melena or hematochezia). The patient denies nausea or vomiting. There is no history of reflux. The patient denies dysphagia or odynophagia. There is no history of recent significant weight loss. There is no history of liver disease in the past. There is no family history of GI malignancy. The patient's last colonoscopy was in 2019 with polyp removed. She was recommended colonoscopy in 3 years for surveillance.      Subjective      Past Medical History:   Diagnosis Date    Cholelithiasis     Removed in     Colon polyp 2019    Recheck i 3 years    Diabetes mellitus 2017    type II     Diverticulosis 2007    Elevated cholesterol     Gestational hypertension 1999    Hepatomegaly 2024    Hypertension     Kidney stone 26    Multiple gestation     1 still born, 2 live burths    Obesity     Ovarian cyst 1999    Pelvic prolapse     Preeclampsia     UTI (urinary tract infection) 2024    Varicella 1968     Past Surgical History:   Procedure Laterality Date    CHOLECYSTECTOMY  1998    COLONOSCOPY  2019    COLONOSCOPY N/A 2023    Procedure: COLONOSCOPY with polypectomy x 5;  Surgeon: Breanne Smith MD;  Location: [facility] ENDOSCOPY;  Service: Gastroenterology;  Laterality: N/A;    KNEE ARTHROSCOPY Left     LAPAROSCOPIC CHOLECYSTECTOMY      TONSILLECTOMY      TUBAL ABDOMINAL LIGATION      VAGINAL PROLAPSE REPAIR  2006     Family History   Problem Relation Age of Onset    Cervical cancer Mother     Hyperlipidemia Mother     Stroke Mother     Hypertension Mother     Cancer Father         Brain tumor - unsure if primary or not    Hyperlipidemia Father     Arthritis Brother     Lung cancer Brother     Heart disease Brother     Hyperlipidemia Brother     Hypertension Brother     Diabetes Brother     Hyperlipidemia Brother     Kidney disease Brother     Hypertension Brother     Heart disease Brother     Hyperlipidemia Brother     Hypertension Brother     Hyperlipidemia Brother     Celiac disease Daughter     Fibromyalgia Daughter     Sjogren's syndrome Daughter     Colon cancer Neg Hx     Breast cancer Neg Hx      Social History     Socioeconomic History    Marital status:    Tobacco Use    Smoking status: Former     Current packs/day: 0.00     Average packs/day: 1.5 packs/day for 25.6 years (38.4 ttl pk-yrs)     Types: Cigarettes     Start date: 1986     Quit date: 2012     Years since quittin.9     Passive exposure: Past    Smokeless tobacco: Never   Vaping Use    Vaping status: Never Used   Substance and Sexual Activity    Alcohol  use: Never    Drug use: Never    Sexual activity: Yes     Partners: Male     Birth control/protection: Post-menopausal, Tubal ligation       Current Outpatient Medications:     Blood Glucose Monitoring Suppl kit, 1 each 4 (Four) Times a Day., Disp: 1 each, Rfl: 0    fenofibrate (TRICOR) 145 MG tablet, Take 1 tablet by mouth Daily., Disp: , Rfl:     glucose blood (Glucose Meter Test) test strip, Use as instructed, Disp: 200 each, Rfl: 12    Jardiance 10 MG tablet tablet, TAKE 1 TABLET BY MOUTH DAILY, Disp: 30 tablet, Rfl: 2    losartan (COZAAR) 25 MG tablet, Take 1 tablet by mouth Daily., Disp: 30 tablet, Rfl: 2    Magnesium 250 MG tablet, Take 1 tablet by mouth At Night As Needed., Disp: , Rfl:     Semaglutide, 2 MG/DOSE, (Ozempic, 2 MG/DOSE,) 8 MG/3ML solution pen-injector, Inject 2 mg under the skin into the appropriate area as directed., Disp: , Rfl:     simvastatin (ZOCOR) 20 MG tablet, Take 1 tablet by mouth Every Evening., Disp: 30 tablet, Rfl: 3    No Known Allergies    The following portions of the patient's history were reviewed and updated as appropriate: allergies, current medications, past family history, past medical history, past social history, past surgical history and problem list.  Objective     Physical Exam  Vitals and nursing note reviewed.   Constitutional:       General: She is not in acute distress.     Appearance: Normal appearance. She is well-developed. She is morbidly obese.   HENT:      Head: Normocephalic and atraumatic.      Mouth/Throat:      Mouth: Mucous membranes are not pale, not dry and not cyanotic.   Eyes:      General: Lids are normal.   Neck:      Trachea: Trachea normal.   Cardiovascular:      Rate and Rhythm: Normal rate.   Pulmonary:      Effort: Pulmonary effort is normal. No respiratory distress.      Breath sounds: Normal breath sounds.   Abdominal:      Tenderness: There is no abdominal tenderness.   Skin:     General: Skin is warm and dry.   Neurological:      Mental  Status: She is alert and oriented to person, place, and time.   Psychiatric:         Mood and Affect: Mood normal.         Speech: Speech normal.         Behavior: Behavior normal. Behavior is cooperative.       Vitals:    12/09/24 1053   BP: (!) 170/110   Pulse: 93   SpO2: 98%   Weight: 102 kg (225 lb)     Body mass index is 39.86 kg/m².     Results Review:   I reviewed the patient's new clinical results.    Admission on 11/17/2024, Discharged on 11/17/2024   Component Date Value Ref Range Status    Glucose 11/17/2024 291 (H)  65 - 99 mg/dL Final    Glucose >180, Hemoglobin A1C recommended.    BUN 11/17/2024 15  8 - 23 mg/dL Final    Creatinine 11/17/2024 0.66  0.57 - 1.00 mg/dL Final    Sodium 11/17/2024 135 (L)  136 - 145 mmol/L Final    Potassium 11/17/2024 3.9  3.5 - 5.2 mmol/L Final    Chloride 11/17/2024 103  98 - 107 mmol/L Final    CO2 11/17/2024 18.4 (L)  22.0 - 29.0 mmol/L Final    Calcium 11/17/2024 8.7  8.6 - 10.5 mg/dL Final    Total Protein 11/17/2024 6.2  6.0 - 8.5 g/dL Final    Albumin 11/17/2024 3.4 (L)  3.5 - 5.2 g/dL Final    ALT (SGPT) 11/17/2024 114 (H)  1 - 33 U/L Final    AST (SGOT) 11/17/2024 23  1 - 32 U/L Final    Alkaline Phosphatase 11/17/2024 142 (H)  39 - 117 U/L Final    Total Bilirubin 11/17/2024 0.5  0.0 - 1.2 mg/dL Final    Globulin 11/17/2024 2.8  gm/dL Final    A/G Ratio 11/17/2024 1.2  g/dL Final    BUN/Creatinine Ratio 11/17/2024 22.7  7.0 - 25.0 Final    Anion Gap 11/17/2024 13.6  5.0 - 15.0 mmol/L Final    eGFR 11/17/2024 99.3  >60.0 mL/min/1.73 Final    Lactate 11/17/2024 0.9  0.5 - 2.0 mmol/L Final    C-Reactive Protein 11/17/2024 3.08 (H)  0.00 - 0.50 mg/dL Final    WBC 11/17/2024 6.05  3.40 - 10.80 10*3/mm3 Final    RBC 11/17/2024 4.73  3.77 - 5.28 10*6/mm3 Final    Hemoglobin 11/17/2024 12.6  12.0 - 15.9 g/dL Final    Hematocrit 11/17/2024 36.8  34.0 - 46.6 % Final    MCV 11/17/2024 77.8 (L)  79.0 - 97.0 fL Final    MCH 11/17/2024 26.6  26.6 - 33.0 pg Final    MCHC  11/17/2024 34.2  31.5 - 35.7 g/dL Final    RDW 11/17/2024 12.9  12.3 - 15.4 % Final    RDW-SD 11/17/2024 36.5 (L)  37.0 - 54.0 fl Final    MPV 11/17/2024 9.7  6.0 - 12.0 fL Final    Platelets 11/17/2024 270  140 - 450 10*3/mm3 Final    Blood Culture 11/17/2024 No growth at 5 days   Final    Blood Culture 11/17/2024 No growth at 5 days   Final    Hemoglobin A1C 11/17/2024 9.20 (H)  4.80 - 5.60 % Final   Admission on 11/14/2024, Discharged on 11/15/2024   Component Date Value Ref Range Status    COVID19 11/14/2024 Not Detected  Not Detected - Ref. Range Final    Influenza A PCR 11/14/2024 Not Detected  Not Detected Final    Influenza B PCR 11/14/2024 Not Detected  Not Detected Final    Glucose 11/14/2024 269 (H)  65 - 99 mg/dL Final    Glucose >180, Hemoglobin A1C recommended.    BUN 11/14/2024 18  8 - 23 mg/dL Final    Creatinine 11/14/2024 1.02 (H)  0.57 - 1.00 mg/dL Final    Sodium 11/14/2024 135 (L)  136 - 145 mmol/L Final    Potassium 11/14/2024 3.9  3.5 - 5.2 mmol/L Final    Chloride 11/14/2024 101  98 - 107 mmol/L Final    CO2 11/14/2024 22.0  22.0 - 29.0 mmol/L Final    Calcium 11/14/2024 8.9  8.6 - 10.5 mg/dL Final    Total Protein 11/14/2024 6.6  6.0 - 8.5 g/dL Final    Albumin 11/14/2024 4.0  3.5 - 5.2 g/dL Final    ALT (SGPT) 11/14/2024 109 (H)  1 - 33 U/L Final    AST (SGOT) 11/14/2024 147 (H)  1 - 32 U/L Final    Alkaline Phosphatase 11/14/2024 95  39 - 117 U/L Final    Total Bilirubin 11/14/2024 0.8  0.0 - 1.2 mg/dL Final    Globulin 11/14/2024 2.6  gm/dL Final    A/G Ratio 11/14/2024 1.5  g/dL Final    BUN/Creatinine Ratio 11/14/2024 17.6  7.0 - 25.0 Final    Anion Gap 11/14/2024 12.0  5.0 - 15.0 mmol/L Final    eGFR 11/14/2024 62.3  >60.0 mL/min/1.73 Final    Color, UA 11/14/2024 Yellow  Yellow, Straw Final    Appearance, UA 11/14/2024 Cloudy (A)  Clear Final    pH, UA 11/14/2024 6.0  5.0 - 8.0 Final    Specific Gravity, UA 11/14/2024 1.020  1.005 - 1.030 Final    Glucose, UA 11/14/2024 >=1000 mg/dL  (3+) (A)  Negative Final    Ketones, UA 11/14/2024 15 mg/dL (1+) (A)  Negative Final    Bilirubin, UA 11/14/2024 Small (1+) (A)  Negative Final    Blood, UA 11/14/2024 Moderate (2+) (A)  Negative Final    Protein, UA 11/14/2024 Trace (A)  Negative Final    Leuk Esterase, UA 11/14/2024 Small (1+) (A)  Negative Final    Nitrite, UA 11/14/2024 Negative  Negative Final    Urobilinogen, UA 11/14/2024 1.0 E.U./dL  0.2 - 1.0 E.U./dL Final    HS Troponin T 11/14/2024 19 (H)  <14 ng/L Final    proBNP 11/14/2024 298.0  0.0 - 900.0 pg/mL Final    C-Reactive Protein 11/14/2024 11.53 (H)  0.00 - 0.50 mg/dL Final    Procalcitonin 11/14/2024 3.70 (H)  0.00 - 0.25 ng/mL Final    Magnesium 11/14/2024 2.0  1.6 - 2.4 mg/dL Final    WBC 11/14/2024 7.46  3.40 - 10.80 10*3/mm3 Final    RBC 11/14/2024 5.12  3.77 - 5.28 10*6/mm3 Final    Hemoglobin 11/14/2024 13.7  12.0 - 15.9 g/dL Final    Hematocrit 11/14/2024 40.4  34.0 - 46.6 % Final    MCV 11/14/2024 78.9 (L)  79.0 - 97.0 fL Final    MCH 11/14/2024 26.8  26.6 - 33.0 pg Final    MCHC 11/14/2024 33.9  31.5 - 35.7 g/dL Final    RDW 11/14/2024 13.0  12.3 - 15.4 % Final    RDW-SD 11/14/2024 37.0  37.0 - 54.0 fl Final    MPV 11/14/2024 10.0  6.0 - 12.0 fL Final    Platelets 11/14/2024 224  140 - 450 10*3/mm3 Final    RBC, UA 11/14/2024 0-2  None Seen, 0-2 /HPF Final    WBC, UA 11/14/2024 Too Numerous to Count (A)  None Seen, 0-2 /HPF Final    Bacteria, UA 11/14/2024 1+ (A)  None Seen /HPF Final    Squamous Epithelial Cells, UA 11/14/2024 7-12 (A)  None Seen, 0-2 /HPF Final    Yeast, UA 11/14/2024 Small/1+ Budding Yeast (A)  None Seen /HPF Final    Hyaline Casts, UA 11/14/2024 None Seen  None Seen /LPF Final    Methodology 11/14/2024 Manual Light Microscopy   Final    Urine Culture 11/14/2024 25,000 CFU/mL Normal Urogenital Génesis   Final    HS Troponin T 11/14/2024 21 (H)  <14 ng/L Final    Troponin T Delta 11/14/2024 2  >=-4 - <+4 ng/L Final    Lactate 11/14/2024 2.5 (C)  0.5 - 2.0 mmol/L  Final    Blood Culture 11/14/2024 No growth at 5 days   Final    Blood Culture 11/14/2024 Staphylococcus aureus (C)   Final      Infectious disease consultation is highly recommended to rule out distant foci of infection.    Isolated from 11/14/2024 Pediatric Bottle   Final    Gram Stain 11/14/2024 Pediatric Bottle Gram positive cocci in clusters (C)   Final    Lactate 11/14/2024 1.6  0.5 - 2.0 mmol/L Final    Glucose 11/15/2024 203 (H)  65 - 99 mg/dL Final    BUN 11/15/2024 18  8 - 23 mg/dL Final    Creatinine 11/15/2024 0.87  0.57 - 1.00 mg/dL Final    Sodium 11/15/2024 133 (L)  136 - 145 mmol/L Final    Potassium 11/15/2024 4.1  3.5 - 5.2 mmol/L Final    Chloride 11/15/2024 100  98 - 107 mmol/L Final    CO2 11/15/2024 19.8 (L)  22.0 - 29.0 mmol/L Final    Calcium 11/15/2024 8.2 (L)  8.6 - 10.5 mg/dL Final    BUN/Creatinine Ratio 11/15/2024 20.7  7.0 - 25.0 Final    Anion Gap 11/15/2024 13.2  5.0 - 15.0 mmol/L Final    eGFR 11/15/2024 75.4  >60.0 mL/min/1.73 Final    WBC 11/15/2024 6.99  3.40 - 10.80 10*3/mm3 Final    RBC 11/15/2024 4.44  3.77 - 5.28 10*6/mm3 Final    Hemoglobin 11/15/2024 11.9 (L)  12.0 - 15.9 g/dL Final    Hematocrit 11/15/2024 34.7  34.0 - 46.6 % Final    MCV 11/15/2024 78.2 (L)  79.0 - 97.0 fL Final    MCH 11/15/2024 26.8  26.6 - 33.0 pg Final    MCHC 11/15/2024 34.3  31.5 - 35.7 g/dL Final    RDW 11/15/2024 12.9  12.3 - 15.4 % Final    RDW-SD 11/15/2024 37.0  37.0 - 54.0 fl Final    MPV 11/15/2024 10.0  6.0 - 12.0 fL Final    Platelets 11/15/2024 209  140 - 450 10*3/mm3 Final    Hemoglobin A1C 11/15/2024 9.30 (H)  4.80 - 5.60 % Final    Body Fluid Culture 11/15/2024 Moderate growth (3+) Staphylococcus aureus (A)   Final    Gram Stain 11/15/2024 No WBCs seen   Final    Gram Stain 11/15/2024 No organisms seen   Final    BCID, PCR 11/14/2024 Staph aureus. mecA/C and MREJ (methicillin resistance gene) NOT detected. Identification by BCID2 PCR (A)  Negative by BCID PCR. Culture to Follow. Final     BOTTLE TYPE 11/14/2024 Pediatric Bottle   Final   Office Visit on 10/18/2024   Component Date Value Ref Range Status    Hemoglobin A1C 10/18/2024 9.4 (A)  4.5 - 5.7 % Final    Lot Number 10/18/2024 [id number]   Final    Expiration Date 10/18/2024 06/20/2026   Final    Total Cholesterol 10/18/2024 151  0 - 200 mg/dL Final    Triglycerides 10/18/2024 138  0 - 150 mg/dL Final    HDL Cholesterol 10/18/2024 44  40 - 60 mg/dL Final    VLDL Cholesterol [place] 10/18/2024 24  5 - 40 mg/dL Final    LDL Chol Calc (NIH) 10/18/2024 83  0 - 100 mg/dL Final    Glucose 10/18/2024 233 (H)  65 - 99 mg/dL Final    BUN 10/18/2024 17  8 - 23 mg/dL Final    Creatinine 10/18/2024 0.96  0.57 - 1.00 mg/dL Final    EGFR Result 10/18/2024 67.5  >60.0 mL/min/1.73 Final    BUN/Creatinine Ratio 10/18/2024 17.7  7.0 - 25.0 Final    Sodium 10/18/2024 136  136 - 145 mmol/L Final    Potassium 10/18/2024 4.8  3.5 - 5.2 mmol/L Final    Chloride 10/18/2024 102  98 - 107 mmol/L Final    Total CO2 10/18/2024 23.2  22.0 - 29.0 mmol/L Final    Calcium 10/18/2024 9.1  8.6 - 10.5 mg/dL Final    Total Protein 10/18/2024 6.8  6.0 - 8.5 g/dL Final    Albumin 10/18/2024 4.3  3.5 - 5.2 g/dL Final    Globulin 10/18/2024 2.5  gm/dL Final    A/G Ratio 10/18/2024 1.7  g/dL Final    Total Bilirubin 10/18/2024 0.5  0.0 - 1.2 mg/dL Final    Alkaline Phosphatase 10/18/2024 102  39 - 117 U/L Final    AST (SGOT) 10/18/2024 23  1 - 32 U/L Final    ALT (SGPT) 10/18/2024 26  1 - 33 U/L Final      YEE With / DsDNA, RNP, Sjogrens A / B, Triana (11/29/2022 10:23)     Comprehensive Metabolic Panel (08/08/2023 07:23)  CBC (No Diff) (08/08/2023 07:23)  Protime-INR (08/08/2023 07:23)  Hepatitis A Antibody, Total (08/08/2023 07:23)  Hepatitis B Surf Antibody Quant (08/08/2023 07:23)  Hepatitis B Surface Antigen (08/08/2023 07:23)  Hepatitis B Core Antibody, Total (08/08/2023 07:23)  Hepatitis C Antibody (08/08/2023 07:23)  Iron Profile (08/08/2023 07:23)  Anti-Smooth Muscle Antibody  Titer (08/08/2023 07:23)  Mitochondrial Antibodies, M2 (08/08/2023 07:23)  Alpha - 1 - Antitrypsin (08/08/2023 07:23)  Ceruloplasmin (08/08/2023 07:23)  LIMON Fibrosure Plus (08/08/2023 07:23)    COMPREHENSIVE METABOLIC PANEL (11/17/2024 15:59)  CBC AND DIFFERENTIAL (11/17/2024 15:59)  Hepatitis C Antibody - ED (11/17/2024 15:59)  ED HIV 1/2 Antibody/Antigen Screen w/Reflex to HIV 1/2 Differentiation (11/17/2024 15:59)    CBC AND DIFFERENTIAL (12/05/2024 16:27)  BASIC METABOLIC PANEL (12/05/2024 16:27)  C-REACTIVE PROTEIN (12/05/2024 16:27)    US Abdomen Limited (08/08/2023 07:45)  CT Abdomen Pelvis With Contrast (04/14/2024 18:04)  CT Abdomen Pelvis Without Contrast (08/20/2024 16:29)    US ABDOMEN LIMITED     Result Date: 11/18/2024  Increased hepatic echogenicity consistent with hepatic steatosis and/or chronic parenchymal disease. Prior cholecystectomy. No significant bile duct dilatation.     CT Abdomen Pelvis Without Contrast     Result Date: 11/14/2024  No hydronephrosis or nephrolithiasis.  Enlarged, fatty infiltrated liver with mild splenomegaly but normal portal vein; findings similar to prior.  Diverticulosis.  Stable precaval lymph node from prior exam.      XR Chest 1 View     Result Date: 11/14/2024  No acute cardiopulmonary process.        Colonoscopy dated 5/4/2023 per Dr. Smith  - One 2 to 3 mm polyp in the cecum, removed with a cold snare. Resected and retrieved.  - One 3 mm polyp in the proximal ascending colon, removed with a cold snare. Resected and retrieved.  - One 3 to 4 mm polyp in the distal ascending colon, removed with a cold snare. Resected and retrieved.  - One 2 to 3 mm polyp in the proximal descending colon, removed with a cold snare. Resected and retrieved.  - One 3 mm polyp in the distal descending colon, removed with a cold snare. Resected and retrieved.  - Diverticulosis in the sigmoid colon.  - Non-bleeding external and internal hemorrhoids.  A.   ASCENDING COLON POLYP,  X2:  Tubular adenoma  Mucosal tag  Negative for high-grade dysplasia or malignancy  B.   CECUM POLYP:  Tubular adenoma  Negative for high-grade dysplasia or malignancy  C.   DESCENDING COLON POLYP, X2:  Tubular adenomas  Negative for high-grade dysplasia or malignancy    Assessment / Plan      1. LIMON (nonalcoholic steatohepatitis)  FibroSure in 2023 with moderate Limon N2/S2-S3/F1.  FIB 4 score 1.10/low risk per labs dated 8/24/2024.  2. Hepatomegaly  3. Fatty (change of) liver, not elsewhere classified  4. Class 2 severe obesity due to excess calories with serious comorbidity and body mass index (BMI) of 39.0 to 39.9 in adult  BMI 39.86  She has a history of mild elevation of alkaline phosphatase for the past few years.  Recent labs with mild elevation of ALT, normal AST and total bilirubin. PT/INR normal.  Iron panel normal.  Ceruloplasmin normal.  Alpha-1 antitrypsin normal.  YEE positive.  Smooth muscle antibody normal.  Mitochondrial antibody normal.  She is not immune to hepatitis A or hepatitis B.  She is negative for hepatitis B and hepatitis C.  HIV negative.  FibroSure in 2023 with moderate Limon N2/S2-S3/F1.  Abdominal ultrasound dated 8/8/2023 with mildly enlarged fatty liver.  CTAP dated 8/20/2024 with hepatomegaly with fatty liver.  Fib 4 score 1.10/low risk per labs dated 8/24/2024.  Advise low-fat diet, exercise and weight reduction.  Patient needs immunizations for hepatitis A and hepatitis B which may be obtained through the health department or PCP office.  Labs    - CBC (No Diff); Future  - Comprehensive Metabolic Panel; Future  - Protime-INR; Future  - LIMON Fibrosure Plus; Future    5. Personal history of colon polyps, unspecified  Colonoscopy 5/4/2023 with polyps removed, tubular adenomas without dysplasia.  No family history of colon cancer.  Colonoscopy for surveillance in 3 years, May 2026.    Patient Instructions   High fiber, low fat diet with liberal water intake.   Advised to exercise 30  minutes 4-5 days per week.   Advised to lose 25-30 pounds in the next 6-12 months.  Colonoscopy for surveillance in May 2026.   The patient is not immune to hepatitis A or hepatitis B and needs vaccines which may be obtained through the health department or PCP office.   Labs  Follow up: 1 year or sooner if needed    ROSY Wallis  12/9/2024    Please note that portions of this note were completed with a voice recognition program.     Patient or patient representative verbalized consent for the use of Ambient Listening during the visit with  ROSY Wallis for chart documentation. 12/9/2024  11:23 EST

## 2024-12-09 NOTE — PROGRESS NOTES
New Patient Consult      Date: 2024   Patient Name: Silva Maldonado  MRN: [id number]  : 1960     Primary Care Provider: Sharmila Oconnell APRN    Chief Complaint   Patient presents with    Fatty Liver     2024  History of Present Illness                 Subjective      Past Medical History:   Diagnosis Date    Cholelithiasis     Removed in     Colon polyp 2019    Recheck i 3 years    Diabetes mellitus 2017    type II    Diverticulosis 2007    Elevated cholesterol     Gestational hypertension 1999    Hypertension     Kidney stone 26    Multiple gestation     1 still born, 2 live burths    Obesity     Ovarian cyst     Pelvic prolapse     Preeclampsia     UTI (urinary tract infection) 2024    Varicella 1968     Past Surgical History:   Procedure Laterality Date    CHOLECYSTECTOMY      COLONOSCOPY  2019    COLONOSCOPY N/A 2023    Procedure: COLONOSCOPY with polypectomy x 5;  Surgeon: Breanne Smith MD;  Location: [facility] ENDOSCOPY;  Service: Gastroenterology;  Laterality: N/A;    KNEE ARTHROSCOPY Left     LAPAROSCOPIC CHOLECYSTECTOMY      TONSILLECTOMY      TUBAL ABDOMINAL LIGATION      VAGINAL PROLAPSE REPAIR  2006     Family History   Problem Relation Age of Onset    Cervical cancer Mother     Hyperlipidemia Mother     Stroke Mother     Hypertension Mother     Cancer Father         Brain tumor - unsure if primary or not    Hyperlipidemia Father     Arthritis Brother     Lung cancer Brother     Heart disease Brother     Hyperlipidemia Brother     Hypertension Brother     Diabetes Brother     Hyperlipidemia Brother     Kidney disease Brother     Hypertension Brother     Heart disease Brother     Hyperlipidemia Brother     Hypertension Brother     Hyperlipidemia Brother     Celiac disease Daughter     Fibromyalgia Daughter     Sjogren's syndrome Daughter     Colon cancer Neg Hx     Breast cancer Neg Hx      Social  History     Socioeconomic History    Marital status:    Tobacco Use    Smoking status: Former     Current packs/day: 0.00     Average packs/day: 1.5 packs/day for 25.6 years (38.4 ttl pk-yrs)     Types: Cigarettes     Start date: 1986     Quit date: 2012     Years since quittin.9     Passive exposure: Past    Smokeless tobacco: Never   Vaping Use    Vaping status: Never Used   Substance and Sexual Activity    Alcohol use: Never    Drug use: Never    Sexual activity: Yes     Partners: Male     Birth control/protection: Post-menopausal, Tubal ligation       Current Outpatient Medications:     Blood Glucose Monitoring Suppl kit, 1 each 4 (Four) Times a Day., Disp: 1 each, Rfl: 0    fenofibrate (TRICOR) 145 MG tablet, Take 1 tablet by mouth Daily., Disp: , Rfl:     glucose blood (Glucose Meter Test) test strip, Use as instructed, Disp: 200 each, Rfl: 12    Jardiance 10 MG tablet tablet, TAKE 1 TABLET BY MOUTH DAILY, Disp: 30 tablet, Rfl: 2    losartan (COZAAR) 25 MG tablet, Take 1 tablet by mouth Daily., Disp: 30 tablet, Rfl: 2    Magnesium 250 MG tablet, Take 1 tablet by mouth At Night As Needed., Disp: , Rfl:     Semaglutide, 2 MG/DOSE, (Ozempic, 2 MG/DOSE,) 8 MG/3ML solution pen-injector, Inject 2 mg under the skin into the appropriate area as directed., Disp: , Rfl:     simvastatin (ZOCOR) 20 MG tablet, Take 1 tablet by mouth Every Evening., Disp: 30 tablet, Rfl: 3    diphenhydrAMINE-zinc acetate 2-0.1 % cream, Apply thin film to area 3 to 4x per day as needed for itching, Disp: , Rfl:     LORazepam (ATIVAN) 1 MG tablet, take half a tablet (0.5mg) 1-2 hours prior to MRI, bring additional half tablet with you MRI in case further anxiolytic is needed, Disp: , Rfl:    No Known Allergies  The following portions of the patient's history were reviewed and updated as appropriate: allergies, current medications, past family history, past medical history, past social history, past surgical history and  problem list.    Objective     Physical Exam    Vitals:    12/09/24 1053   BP: (!) 170/110   Pulse: 93   SpO2: 98%   Weight: 102 kg (225 lb)     Body mass index is 39.86 kg/m².     Results Review:   I have reviewed the patient's new clinical and imaging results.    Admission on 11/17/2024, Discharged on 11/17/2024   Component Date Value Ref Range Status    Glucose 11/17/2024 291 (H)  65 - 99 mg/dL Final    Glucose >180, Hemoglobin A1C recommended.    BUN 11/17/2024 15  8 - 23 mg/dL Final    Creatinine 11/17/2024 0.66  0.57 - 1.00 mg/dL Final    Sodium 11/17/2024 135 (L)  136 - 145 mmol/L Final    Potassium 11/17/2024 3.9  3.5 - 5.2 mmol/L Final    Chloride 11/17/2024 103  98 - 107 mmol/L Final    CO2 11/17/2024 18.4 (L)  22.0 - 29.0 mmol/L Final    Calcium 11/17/2024 8.7  8.6 - 10.5 mg/dL Final    Total Protein 11/17/2024 6.2  6.0 - 8.5 g/dL Final    Albumin 11/17/2024 3.4 (L)  3.5 - 5.2 g/dL Final    ALT (SGPT) 11/17/2024 114 (H)  1 - 33 U/L Final    AST (SGOT) 11/17/2024 23  1 - 32 U/L Final    Alkaline Phosphatase 11/17/2024 142 (H)  39 - 117 U/L Final    Total Bilirubin 11/17/2024 0.5  0.0 - 1.2 mg/dL Final    Globulin 11/17/2024 2.8  gm/dL Final    A/G Ratio 11/17/2024 1.2  g/dL Final    BUN/Creatinine Ratio 11/17/2024 22.7  7.0 - 25.0 Final    Anion Gap 11/17/2024 13.6  5.0 - 15.0 mmol/L Final    eGFR 11/17/2024 99.3  >60.0 mL/min/1.73 Final    Lactate 11/17/2024 0.9  0.5 - 2.0 mmol/L Final    C-Reactive Protein 11/17/2024 3.08 (H)  0.00 - 0.50 mg/dL Final    WBC 11/17/2024 6.05  3.40 - 10.80 10*3/mm3 Final    RBC 11/17/2024 4.73  3.77 - 5.28 10*6/mm3 Final    Hemoglobin 11/17/2024 12.6  12.0 - 15.9 g/dL Final    Hematocrit 11/17/2024 36.8  34.0 - 46.6 % Final    MCV 11/17/2024 77.8 (L)  79.0 - 97.0 fL Final    MCH 11/17/2024 26.6  26.6 - 33.0 pg Final    MCHC 11/17/2024 34.2  31.5 - 35.7 g/dL Final    RDW 11/17/2024 12.9  12.3 - 15.4 % Final    RDW-SD 11/17/2024 36.5 (L)  37.0 - 54.0 fl Final    MPV  11/17/2024 9.7  6.0 - 12.0 fL Final    Platelets 11/17/2024 270  140 - 450 10*3/mm3 Final    Neutrophil % 11/17/2024 63.8  42.7 - 76.0 % Final    Lymphocyte % 11/17/2024 19.2 (L)  19.6 - 45.3 % Final    Monocyte % 11/17/2024 7.1  5.0 - 12.0 % Final    Eosinophil % 11/17/2024 6.3 (H)  0.3 - 6.2 % Final    Basophil % 11/17/2024 1.3  0.0 - 1.5 % Final    Immature Grans % 11/17/2024 2.3 (H)  0.0 - 0.5 % Final    Neutrophils, Absolute 11/17/2024 3.86  1.70 - 7.00 10*3/mm3 Final    Lymphocytes, Absolute 11/17/2024 1.16  0.70 - 3.10 10*3/mm3 Final    Monocytes, Absolute 11/17/2024 0.43  0.10 - 0.90 10*3/mm3 Final    Eosinophils, Absolute 11/17/2024 0.38  0.00 - 0.40 10*3/mm3 Final    Basophils, Absolute 11/17/2024 0.08  0.00 - 0.20 10*3/mm3 Final    Immature Grans, Absolute 11/17/2024 0.14 (H)  0.00 - 0.05 10*3/mm3 Final    nRBC 11/17/2024 0.0  0.0 - 0.2 /100 WBC Final    Blood Culture 11/17/2024 No growth at 5 days   Final    Blood Culture 11/17/2024 No growth at 5 days   Final    Hemoglobin A1C 11/17/2024 9.20 (H)  4.80 - 5.60 % Final   Admission on 11/14/2024, Discharged on 11/15/2024   Component Date Value Ref Range Status    COVID19 11/14/2024 Not Detected  Not Detected - Ref. Range Final    Influenza A PCR 11/14/2024 Not Detected  Not Detected Final    Influenza B PCR 11/14/2024 Not Detected  Not Detected Final    Glucose 11/14/2024 269 (H)  65 - 99 mg/dL Final    Glucose >180, Hemoglobin A1C recommended.    BUN 11/14/2024 18  8 - 23 mg/dL Final    Creatinine 11/14/2024 1.02 (H)  0.57 - 1.00 mg/dL Final    Sodium 11/14/2024 135 (L)  136 - 145 mmol/L Final    Potassium 11/14/2024 3.9  3.5 - 5.2 mmol/L Final    Chloride 11/14/2024 101  98 - 107 mmol/L Final    CO2 11/14/2024 22.0  22.0 - 29.0 mmol/L Final    Calcium 11/14/2024 8.9  8.6 - 10.5 mg/dL Final    Total Protein 11/14/2024 6.6  6.0 - 8.5 g/dL Final    Albumin 11/14/2024 4.0  3.5 - 5.2 g/dL Final    ALT (SGPT) 11/14/2024 109 (H)  1 - 33 U/L Final    AST (SGOT)  11/14/2024 147 (H)  1 - 32 U/L Final    Alkaline Phosphatase 11/14/2024 95  39 - 117 U/L Final    Total Bilirubin 11/14/2024 0.8  0.0 - 1.2 mg/dL Final    Globulin 11/14/2024 2.6  gm/dL Final    A/G Ratio 11/14/2024 1.5  g/dL Final    BUN/Creatinine Ratio 11/14/2024 17.6  7.0 - 25.0 Final    Anion Gap 11/14/2024 12.0  5.0 - 15.0 mmol/L Final    eGFR 11/14/2024 62.3  >60.0 mL/min/1.73 Final    Color, UA 11/14/2024 Yellow  Yellow, Straw Final    Appearance, UA 11/14/2024 Cloudy (A)  Clear Final    pH, UA 11/14/2024 6.0  5.0 - 8.0 Final    Specific Gravity, UA 11/14/2024 1.020  1.005 - 1.030 Final    Glucose, UA 11/14/2024 >=1000 mg/dL (3+) (A)  Negative Final    Ketones, UA 11/14/2024 15 mg/dL (1+) (A)  Negative Final    Bilirubin, UA 11/14/2024 Small (1+) (A)  Negative Final    Blood, UA 11/14/2024 Moderate (2+) (A)  Negative Final    Protein, UA 11/14/2024 Trace (A)  Negative Final    Leuk Esterase, UA 11/14/2024 Small (1+) (A)  Negative Final    Nitrite, UA 11/14/2024 Negative  Negative Final    Urobilinogen, UA 11/14/2024 1.0 E.U./dL  0.2 - 1.0 E.U./dL Final    HS Troponin T 11/14/2024 19 (H)  <14 ng/L Final    proBNP 11/14/2024 298.0  0.0 - 900.0 pg/mL Final    C-Reactive Protein 11/14/2024 11.53 (H)  0.00 - 0.50 mg/dL Final    Procalcitonin 11/14/2024 3.70 (H)  0.00 - 0.25 ng/mL Final    Magnesium 11/14/2024 2.0  1.6 - 2.4 mg/dL Final    WBC 11/14/2024 7.46  3.40 - 10.80 10*3/mm3 Final    RBC 11/14/2024 5.12  3.77 - 5.28 10*6/mm3 Final    Hemoglobin 11/14/2024 13.7  12.0 - 15.9 g/dL Final    Hematocrit 11/14/2024 40.4  34.0 - 46.6 % Final    MCV 11/14/2024 78.9 (L)  79.0 - 97.0 fL Final    MCH 11/14/2024 26.8  26.6 - 33.0 pg Final    MCHC 11/14/2024 33.9  31.5 - 35.7 g/dL Final    RDW 11/14/2024 13.0  12.3 - 15.4 % Final    RDW-SD 11/14/2024 37.0  37.0 - 54.0 fl Final    MPV 11/14/2024 10.0  6.0 - 12.0 fL Final    Platelets 11/14/2024 224  140 - 450 10*3/mm3 Final    Neutrophil % 11/14/2024 92.7 (H)  42.7 - 76.0  % Final    Lymphocyte % 11/14/2024 2.5 (L)  19.6 - 45.3 % Final    Monocyte % 11/14/2024 1.3 (L)  5.0 - 12.0 % Final    Eosinophil % 11/14/2024 2.3  0.3 - 6.2 % Final    Basophil % 11/14/2024 0.4  0.0 - 1.5 % Final    Immature Grans % 11/14/2024 0.8 (H)  0.0 - 0.5 % Final    Neutrophils, Absolute 11/14/2024 6.91  1.70 - 7.00 10*3/mm3 Final    Lymphocytes, Absolute 11/14/2024 0.19 (L)  0.70 - 3.10 10*3/mm3 Final    Monocytes, Absolute 11/14/2024 0.10  0.10 - 0.90 10*3/mm3 Final    Eosinophils, Absolute 11/14/2024 0.17  0.00 - 0.40 10*3/mm3 Final    Basophils, Absolute 11/14/2024 0.03  0.00 - 0.20 10*3/mm3 Final    Immature Grans, Absolute 11/14/2024 0.06 (H)  0.00 - 0.05 10*3/mm3 Final    nRBC 11/14/2024 0.0  0.0 - 0.2 /100 WBC Final    RBC, UA 11/14/2024 0-2  None Seen, 0-2 /HPF Final    WBC, UA 11/14/2024 Too Numerous to Count (A)  None Seen, 0-2 /HPF Final    Bacteria, UA 11/14/2024 1+ (A)  None Seen /HPF Final    Squamous Epithelial Cells, UA 11/14/2024 7-12 (A)  None Seen, 0-2 /HPF Final    Yeast, UA 11/14/2024 Small/1+ Budding Yeast (A)  None Seen /HPF Final    Hyaline Casts, UA 11/14/2024 None Seen  None Seen /LPF Final    Methodology 11/14/2024 Manual Light Microscopy   Final    Urine Culture 11/14/2024 25,000 CFU/mL Normal Urogenital Génesis   Final    HS Troponin T 11/14/2024 21 (H)  <14 ng/L Final    Troponin T Delta 11/14/2024 2  >=-4 - <+4 ng/L Final    Lactate 11/14/2024 2.5 (C)  0.5 - 2.0 mmol/L Final    Blood Culture 11/14/2024 No growth at 5 days   Final    Blood Culture 11/14/2024 Staphylococcus aureus (C)   Final      Infectious disease consultation is highly recommended to rule out distant foci of infection.    Isolated from 11/14/2024 Pediatric Bottle   Final    Gram Stain 11/14/2024 Pediatric Bottle Gram positive cocci in clusters (C)   Final    Lactate 11/14/2024 1.6  0.5 - 2.0 mmol/L Final    Glucose 11/15/2024 203 (H)  65 - 99 mg/dL Final    BUN 11/15/2024 18  8 - 23 mg/dL Final    Creatinine  11/15/2024 0.87  0.57 - 1.00 mg/dL Final    Sodium 11/15/2024 133 (L)  136 - 145 mmol/L Final    Potassium 11/15/2024 4.1  3.5 - 5.2 mmol/L Final    Chloride 11/15/2024 100  98 - 107 mmol/L Final    CO2 11/15/2024 19.8 (L)  22.0 - 29.0 mmol/L Final    Calcium 11/15/2024 8.2 (L)  8.6 - 10.5 mg/dL Final    BUN/Creatinine Ratio 11/15/2024 20.7  7.0 - 25.0 Final    Anion Gap 11/15/2024 13.2  5.0 - 15.0 mmol/L Final    eGFR 11/15/2024 75.4  >60.0 mL/min/1.73 Final    WBC 11/15/2024 6.99  3.40 - 10.80 10*3/mm3 Final    RBC 11/15/2024 4.44  3.77 - 5.28 10*6/mm3 Final    Hemoglobin 11/15/2024 11.9 (L)  12.0 - 15.9 g/dL Final    Hematocrit 11/15/2024 34.7  34.0 - 46.6 % Final    MCV 11/15/2024 78.2 (L)  79.0 - 97.0 fL Final    MCH 11/15/2024 26.8  26.6 - 33.0 pg Final    MCHC 11/15/2024 34.3  31.5 - 35.7 g/dL Final    RDW 11/15/2024 12.9  12.3 - 15.4 % Final    RDW-SD 11/15/2024 37.0  37.0 - 54.0 fl Final    MPV 11/15/2024 10.0  6.0 - 12.0 fL Final    Platelets 11/15/2024 209  140 - 450 10*3/mm3 Final    Neutrophil % 11/15/2024 81.8 (H)  42.7 - 76.0 % Final    Lymphocyte % 11/15/2024 5.9 (L)  19.6 - 45.3 % Final    Monocyte % 11/15/2024 7.6  5.0 - 12.0 % Final    Eosinophil % 11/15/2024 3.6  0.3 - 6.2 % Final    Basophil % 11/15/2024 0.4  0.0 - 1.5 % Final    Immature Grans % 11/15/2024 0.7 (H)  0.0 - 0.5 % Final    Neutrophils, Absolute 11/15/2024 5.72  1.70 - 7.00 10*3/mm3 Final    Lymphocytes, Absolute 11/15/2024 0.41 (L)  0.70 - 3.10 10*3/mm3 Final    Monocytes, Absolute 11/15/2024 0.53  0.10 - 0.90 10*3/mm3 Final    Eosinophils, Absolute 11/15/2024 0.25  0.00 - 0.40 10*3/mm3 Final    Basophils, Absolute 11/15/2024 0.03  0.00 - 0.20 10*3/mm3 Final    Immature Grans, Absolute 11/15/2024 0.05  0.00 - 0.05 10*3/mm3 Final    nRBC 11/15/2024 0.0  0.0 - 0.2 /100 WBC Final    Hemoglobin A1C 11/15/2024 9.30 (H)  4.80 - 5.60 % Final    Body Fluid Culture 11/15/2024 Moderate growth (3+) Staphylococcus aureus (A)   Final    Gram  Stain 11/15/2024 No WBCs seen   Final    Gram Stain 11/15/2024 No organisms seen   Final    BCID, PCR 11/14/2024 Staph aureus. mecA/C and MREJ (methicillin resistance gene) NOT detected. Identification by BCID2 PCR (A)  Negative by BCID PCR. Culture to Follow. Final    BOTTLE TYPE 11/14/2024 Pediatric Bottle   Final   Office Visit on 11/12/2024   Component Date Value Ref Range Status    Color 11/12/2024 Yellow  Yellow, Straw, Dark Yellow, Mariya Final    Clarity, UA 11/12/2024 Cloudy (A)  Clear Final    Glucose, UA 11/12/2024 3+ (A)  Negative mg/dL Final    Bilirubin 11/12/2024 Negative  Negative Final    Ketones, UA 11/12/2024 Negative  Negative Final    Specific Gravity  11/12/2024 1.015  1.005 - 1.030 Final    Blood, UA 11/12/2024 1+ (A)  Negative Final    pH, Urine 11/12/2024 6.0  5.0 - 8.0 Final    Protein, POC 11/12/2024 Negative  Negative mg/dL Final    Urobilinogen, UA 11/12/2024 Normal  Normal, 0.2 E.U./dL Final    Leukocytes 11/12/2024 Negative  Negative Final    Nitrite, UA 11/12/2024 Positive (A)  Negative Final    Urine Culture 11/12/2024 Final report (A)   Final    Result 1 11/12/2024 Escherichia coli (A)   Final    Comment: Cefazolin <=4 ug/mL  Cefazolin with an SARAH <=16 predicts susceptibility to the oral agents  cefaclor, cefdinir, cefpodoxime, cefprozil, cefuroxime, cephalexin,  and loracarbef when used for therapy of uncomplicated urinary tract  infections due to E. coli, Klebsiella pneumoniae, and Proteus  mirabilis.  Greater than 100,000 colony forming units per mL      Susceptibility Testing 11/12/2024 Comment   Final    Comment:       ** S = Susceptible; I = Intermediate; R = Resistant **                     P = Positive; N = Negative              MICS are expressed in micrograms per mL     Antibiotic                 RSLT#1    RSLT#2    RSLT#3    RSLT#4  Amoxicillin/Clavulanic Acid    S  Ampicillin                     S  Cefepime                       S  Ceftriaxone                     S  Cefuroxime                     S  Ciprofloxacin                  S  Ertapenem                      S  Gentamicin                     S  Imipenem                       S  Levofloxacin                   S  Meropenem                      S  Nitrofurantoin                 S  Piperacillin/Tazobactam        S  Tetracycline                   S  Tobramycin                     S  Trimethoprim/Sulfa             S     Office Visit on 10/18/2024   Component Date Value Ref Range Status    Hemoglobin A1C 10/18/2024 9.4 (A)  4.5 - 5.7 % Final    Lot Number 10/18/2024 [id number]   Final    Expiration Date 10/18/2024 06/20/2026   Final    Total Cholesterol 10/18/2024 151  0 - 200 mg/dL Final    Comment: Cholesterol Reference Ranges  (U.S. Department of Health and Human Services ATP III  Classifications)  Desirable          <200 mg/dL  Borderline High    200-239 mg/dL  High Risk          >240 mg/dL  Triglyceride Reference Ranges  (U.S. Department of Health and Human Services ATP III  Classifications)  Normal           <150 mg/dL  Borderline High  150-199 mg/dL  High             200-499 mg/dL  Very High        >500 mg/dL  HDL Reference Ranges  (U.S. Department of Health and Human Services ATP III  Classifications)  Low     <40 mg/dl (major risk factor for CHD)  High    >60 mg/dl ('negative' risk factor for CHD)  LDL Reference Ranges  (U.S. Department of Health and Human Services ATP III  Classifications)  Optimal          <100 mg/dL  Near Optimal     100-129 mg/dL  Borderline High  130-159 mg/dL  High             160-189 mg/dL  Very High        >189 mg/dL      Triglycerides 10/18/2024 138  0 - 150 mg/dL Final    HDL Cholesterol 10/18/2024 44  40 - 60 mg/dL Final    VLDL Cholesterol [place] 10/18/2024 24  5 - 40 mg/dL Final    LDL Chol Calc (NIH) 10/18/2024 83  0 - 100 mg/dL Final    Glucose 10/18/2024 233 (H)  65 - 99 mg/dL Final    BUN 10/18/2024 17  8 - 23 mg/dL Final    Creatinine 10/18/2024 0.96  0.57 - 1.00 mg/dL Final    EGFR  Result 10/18/2024 67.5  >60.0 mL/min/1.73 Final    Comment: GFR Normal >60  Chronic Kidney Disease <60  Kidney Failure <15      BUN/Creatinine Ratio 10/18/2024 17.7  7.0 - 25.0 Final    Sodium 10/18/2024 136  136 - 145 mmol/L Final    Potassium 10/18/2024 4.8  3.5 - 5.2 mmol/L Final    Comment: Slight hemolysis detected by analyzer. Result may be  falsely elevated.      Chloride 10/18/2024 102  98 - 107 mmol/L Final    Total CO2 10/18/2024 23.2  22.0 - 29.0 mmol/L Final    Calcium 10/18/2024 9.1  8.6 - 10.5 mg/dL Final    Total Protein 10/18/2024 6.8  6.0 - 8.5 g/dL Final    Albumin 10/18/2024 4.3  3.5 - 5.2 g/dL Final    Globulin 10/18/2024 2.5  gm/dL Final    A/G Ratio 10/18/2024 1.7  g/dL Final    Total Bilirubin 10/18/2024 0.5  0.0 - 1.2 mg/dL Final    Alkaline Phosphatase 10/18/2024 102  39 - 117 U/L Final    AST (SGOT) 10/18/2024 23  1 - 32 U/L Final    ALT (SGPT) 10/18/2024 26  1 - 33 U/L Final      MRI Hand Right With & Without Contrast    Result Date: 12/4/2024  Increased edema-like signal and enhancement within the middle and distal phalanges of the index finger with loss of T1 signal consistent with osteomyelitis. There is inflammation of the surrounding soft tissues representing a component of associated cellulitis. While the soft tissue inflammation of the index finger and hand appears overall decreased since the comparison MRI study of the osseous component appears to involve a greater portion of the middle phalanx and distal phalanx compared to the previous study may represent worsening osteomyelitis.. Small focus of mild enhancement along the sheath of flexor tendon of the index finger which may represent adjacent reactive soft tissue and/or mild tenosynovitis. There is also mild edema and enhancement along the distal aspect of the extensor tendon which could be reactive or represent a component of mild tenosynovitis. CRITICAL RESULT:   No. COMMUNICATION: Per this written report. By electronically  signing this report, I, the attending physician, attest that I have personally reviewed the images/data for the above examination(s) and agree with the final edited report. Drafted by Celio Donohue MD on 12/4/2024 1:16 PM Final report signed by Iek Menjivar on 12/4/2024 2:42 PM    US ABDOMEN LIMITED    Result Date: 11/18/2024  Increased hepatic echogenicity consistent with hepatic steatosis and/or chronic parenchymal disease. Prior cholecystectomy. No significant bile duct dilatation. CRITICAL RESULT: No. COMMUNICATION: Per this written report. Drafted by Eleuterio Bowie MD on 11/18/2024 12:08 PM Final report signed by Eleuterio Bowie MD on 11/18/2024 12:14 PM    MRI HAND RIGHT W WO CONTRAST    Result Date: 11/18/2024  Enhancing edema throughout the subcutaneous tissues of the visualized hand and wrist most notably along the dorsal radial aspect could represent cellulitis. Enhancing edema extends into the first and second digit and mildly in the third and fourth digits. No focal fluid collection to suggest abscess. Mild edema and enhancement extending along the second digit flexor tendon could be reactive or representing tenosynovitis. There is edema-like marrow signal change with enhancement in the middle phalanx of the second digit with no significant T1 signal drop. While this does not meet strict criteria for osteomyelitis at this time, given adjacent inflammation a component of early infection cannot be entirely excluded. Follow-up could be considered to assess stability. Small effusion of the distal interphalangeal joint of the second digit could be reactive however follow-up could be considered to ensure stability. CRITICAL RESULT:   No. COMMUNICATION: Per this written report. Drafted by Ike Menjivar on 11/18/2024 11:13 AM Final report signed by Ike Menjivar on 11/18/2024 11:25 AM    XR HAND 3+ VW RIGHT    Result Date: 11/17/2024  No acute osseous findings. CRITICAL RESULT:   No. COMMUNICATION: Per this  written report. Drafted by Salvatore Jaeger MD on 11/17/2024 4:54 PM Final report signed by Salvatore Jaeger MD on 11/17/2024 4:55 PM    XR Hand 3+ View Right    Result Date: 11/17/2024  No acute osseous changes.     This report was signed and finalized on 11/17/2024 10:42 AM by Maxi Brown DO.      CT Abdomen Pelvis Without Contrast    Result Date: 11/14/2024  No hydronephrosis or nephrolithiasis.  Enlarged, fatty infiltrated liver with mild splenomegaly but normal portal vein; findings similar to prior.  Diverticulosis.  Stable precaval lymph node from prior exam.   CTDI: 12.29 mGy DLP:591.12 mGy.cm  This report was signed and finalized on 11/14/2024 11:57 AM by Marycruz Maldonado MD.      XR Chest 1 View    Result Date: 11/14/2024  No acute cardiopulmonary process.     This report was signed and finalized on 11/14/2024 8:38 AM by Marycruz Maldonado MD.        Assessment / Plan      There are no diagnoses linked to this encounter.  There are no Patient Instructions on file for this visit.  Assessment & Plan          Castro Jeronimo MA  12/9/2024    Please note that portions of this note may have been completed with a voice recognition program.     {[facility] [employer] Provider Statement:[id number]}

## 2024-12-09 NOTE — PATIENT INSTRUCTIONS
High fiber, low fat diet with liberal water intake.   Advised to exercise 30 minutes 4-5 days per week.   Advised to lose 25-30 pounds in the next 6-12 months.  Colonoscopy for surveillance in May 2026.   The patient is not immune to hepatitis A or hepatitis B and needs vaccines which may be obtained through the health department or PCP office.   Labs  Follow up: 1 year or sooner if needed

## 2024-12-17 ENCOUNTER — TRANSCRIBE ORDERS (OUTPATIENT)
Dept: LAB | Facility: HOSPITAL | Age: 64
End: 2024-12-17
Payer: COMMERCIAL

## 2024-12-17 DIAGNOSIS — R19.7 DIARRHEA, UNSPECIFIED TYPE: Primary | ICD-10-CM

## 2024-12-17 LAB — C DIFF TOX GENS STL QL NAA+PROBE: NOT DETECTED

## 2024-12-17 PROCEDURE — 87493 C DIFF AMPLIFIED PROBE: CPT | Performed by: INTERNAL MEDICINE

## 2025-01-24 ENCOUNTER — OFFICE VISIT (OUTPATIENT)
Dept: FAMILY MEDICINE CLINIC | Facility: CLINIC | Age: 65
End: 2025-01-24
Payer: COMMERCIAL

## 2025-01-24 VITALS
HEART RATE: 84 BPM | RESPIRATION RATE: 16 BRPM | SYSTOLIC BLOOD PRESSURE: 124 MMHG | OXYGEN SATURATION: 97 % | HEIGHT: 63 IN | DIASTOLIC BLOOD PRESSURE: 82 MMHG | WEIGHT: 229 LBS | BODY MASS INDEX: 40.57 KG/M2

## 2025-01-24 DIAGNOSIS — R45.89 ANXIETY ABOUT TREATMENT: ICD-10-CM

## 2025-01-24 DIAGNOSIS — E11.42 TYPE 2 DIABETES MELLITUS WITH DIABETIC POLYNEUROPATHY, WITHOUT LONG-TERM CURRENT USE OF INSULIN: Primary | ICD-10-CM

## 2025-01-24 DIAGNOSIS — I10 PRIMARY HYPERTENSION: ICD-10-CM

## 2025-01-24 DIAGNOSIS — M86.9 OSTEOMYELITIS OF OTHER SITE, UNSPECIFIED TYPE: ICD-10-CM

## 2025-01-24 LAB
EXPIRATION DATE: ABNORMAL
HBA1C MFR BLD: 8.5 % (ref 4.5–5.7)
Lab: ABNORMAL

## 2025-01-24 PROCEDURE — 83036 HEMOGLOBIN GLYCOSYLATED A1C: CPT | Performed by: NURSE PRACTITIONER

## 2025-01-24 PROCEDURE — 99214 OFFICE O/P EST MOD 30 MIN: CPT | Performed by: NURSE PRACTITIONER

## 2025-01-24 RX ORDER — DIAZEPAM 2 MG/1
2 TABLET ORAL 2 TIMES DAILY PRN
Qty: 20 TABLET | Refills: 0 | Status: SHIPPED | OUTPATIENT
Start: 2025-01-24

## 2025-01-24 NOTE — PROGRESS NOTES
"                      Established Patient        Chief Complaint:   Chief Complaint   Patient presents with    Hypertension     Pt is here for follow up. Pt has questions about amputation and wants to check her blood pressure            History of Present Illness:    Silva Maldonado is a 64 y.o. female who presents today for follow up of HTN, DM.    Has not been taking medication consistently, has been eating worse than usual    Has been on abx since November for osteomyelitis of right index finger--scheduled to amputate partial index finger next Thursday. Unable to sleep, crying during appointment today. Verbalizes that she understands why this is the best option for her but still having difficulty coming to terms with situation.     Subjective     The following portions of the patient's history were reviewed and updated as appropriate: allergies, current medications, past family history, past medical history, past social history, past surgical history and problem list.    ALLERGIES  No Known Allergies    Review of Systems  Gen- No fevers, chills  HEENT--No runny nose, congestion, sore throat, ear pain  CV- No chest pain, palpitations  Resp- No cough, dyspnea  GI- No N/V/D, abd pain  -No dysuria, flank pain, difficulty urinating  Musc-No tenderness, +swelling, +decreased ROM  Neuro-No dizziness, headaches  Psych-No SI/HI, +anxiety, +dysphoric mood, +sleep disturbance    Objective     Vital Signs:   /82   Pulse 84   Resp 16   Ht 160 cm (63\")   Wt 104 kg (229 lb)   SpO2 97%   BMI 40.57 kg/m²                Physical Exam   Physical Exam  Vitals and nursing note reviewed.   HENT:      Mouth/Throat:      Lips: Pink.   Eyes:      General: Lids are normal.   Cardiovascular:      Rate and Rhythm: Normal rate and regular rhythm.      Heart sounds: Normal heart sounds.   Pulmonary:      Effort: Pulmonary effort is normal.      Breath sounds: Normal breath sounds.   Musculoskeletal:      Right hand: " Swelling present. No deformity or tenderness. Decreased range of motion.   Skin:     Findings: Erythema present.          Neurological:      Mental Status: She is alert and oriented to person, place, and time.      Gait: Gait is intact.   Psychiatric:         Attention and Perception: Attention normal.         Mood and Affect: Mood normal. Affect is tearful. Affect is not inappropriate.         Speech: Speech normal.         Behavior: Behavior normal. Behavior is cooperative.         Assessment and Plan      Assessment/Plan:   Diagnoses and all orders for this visit:    1. Type 2 diabetes mellitus with diabetic polyneuropathy, without long-term current use of insulin (Primary)  -     empagliflozin (Jardiance) 10 MG tablet tablet; Take 1 tablet by mouth Daily.  Dispense: 90 tablet; Refill: 1  -     POC Glycosylated Hemoglobin (Hb A1C)    2. Anxiety about treatment  -     diazePAM (Valium) 2 MG tablet; Take 1 tablet by mouth 2 (Two) Times a Day As Needed for Anxiety.  Dispense: 20 tablet; Refill: 0    3. Primary hypertension    4. Osteomyelitis of other site, unspecified type    Risks, benefits, and potential side effects of current/new medications reviewed with patient.  Patient voiced understanding and wished to proceed with treatment.    Discussed need for reduction in sodium/salt/caffeine intake; improve sleep habits as able; inc formal CV exercise program with goal of vigorous activity most, if not all, days of the week.     Ambulatory blood pressure monitoring encouraged prior to taking medication daily and as needed.    Contact office for symptomatic HTN or consistently uncontrolled HTN.     Patient to increase dietary intake of vegetables, fruits, nuts, whole grains and fish. Decrease dietary intake of carbs, processed foods such as lunch meats, saturated fats, sugary beverages.     Increase exercise regimen as tolerated toward a goal of 120-150 minutes/week.     Patient to present to ED for chest pain,  confusion, vision disturbance.     Encouraged ambulatory glucose monitoring. Patient to call office or RTC for glucose levels consistently greater than 300 or poorly controlled with current medication regimen.     Discussed need for stress/anxiety reducing techniques such as prayer/meditation/breathing and counting exercises and avoidance of stress producing environments/situations; will follow clinically.    Patient was encouraged to keep me informed of any acute changes, lack of improvement, or any new concerning symptoms.      Discussion Summary:  Discussed plan of care in detail with pt today; pt verb understanding and agrees.        I have reviewed and updated all copied forward information, as appropriate.  I attest to the accuracy and relevance of any unchanged information.      Follow up:  No follow-ups on file.     Patient Education:  There are no Patient Instructions on file for this visit.    ROSY Cruz  02/12/25  09:22 EST          Please note that portions of this note may have been completed with a voice recognition program.

## 2025-01-31 ENCOUNTER — PRIOR AUTHORIZATION (OUTPATIENT)
Dept: FAMILY MEDICINE CLINIC | Facility: CLINIC | Age: 65
End: 2025-01-31
Payer: COMMERCIAL

## 2025-01-31 NOTE — TELEPHONE ENCOUNTER
Prior Authorization has been started on [employer] for Ozempic    Waiting on response from insurance       Key: [id number]

## 2025-02-12 PROBLEM — E11.42 TYPE 2 DIABETES MELLITUS WITH DIABETIC POLYNEUROPATHY, WITHOUT LONG-TERM CURRENT USE OF INSULIN: Status: ACTIVE | Noted: 2025-02-12

## 2025-03-03 ENCOUNTER — TRANSCRIBE ORDERS (OUTPATIENT)
Dept: ADMINISTRATIVE | Facility: HOSPITAL | Age: 65
End: 2025-03-03
Payer: COMMERCIAL

## 2025-03-03 DIAGNOSIS — Z12.31 VISIT FOR SCREENING MAMMOGRAM: Primary | ICD-10-CM

## 2025-04-25 ENCOUNTER — OFFICE VISIT (OUTPATIENT)
Dept: FAMILY MEDICINE CLINIC | Facility: CLINIC | Age: 65
End: 2025-04-25
Payer: COMMERCIAL

## 2025-04-25 VITALS
HEART RATE: 74 BPM | SYSTOLIC BLOOD PRESSURE: 130 MMHG | WEIGHT: 225.6 LBS | RESPIRATION RATE: 16 BRPM | HEIGHT: 63 IN | BODY MASS INDEX: 39.97 KG/M2 | OXYGEN SATURATION: 94 % | DIASTOLIC BLOOD PRESSURE: 82 MMHG

## 2025-04-25 DIAGNOSIS — Z87.891 PERSONAL HISTORY OF TOBACCO USE, PRESENTING HAZARDS TO HEALTH: ICD-10-CM

## 2025-04-25 DIAGNOSIS — R53.83 FATIGUE, UNSPECIFIED TYPE: ICD-10-CM

## 2025-04-25 DIAGNOSIS — E78.2 MIXED HYPERLIPIDEMIA: ICD-10-CM

## 2025-04-25 DIAGNOSIS — S68.119S AMPUTATION OF TIP OF FINGER, SEQUELA: ICD-10-CM

## 2025-04-25 DIAGNOSIS — E55.9 VITAMIN D DEFICIENCY: ICD-10-CM

## 2025-04-25 DIAGNOSIS — E11.65 TYPE 2 DIABETES MELLITUS WITH HYPERGLYCEMIA, WITHOUT LONG-TERM CURRENT USE OF INSULIN: Primary | ICD-10-CM

## 2025-04-25 DIAGNOSIS — E53.8 VITAMIN B12 DEFICIENCY: ICD-10-CM

## 2025-04-25 DIAGNOSIS — I10 PRIMARY HYPERTENSION: ICD-10-CM

## 2025-04-25 DIAGNOSIS — Z87.891 HISTORY OF NICOTINE DEPENDENCE: ICD-10-CM

## 2025-04-25 LAB
EXPIRATION DATE: ABNORMAL
EXPIRATION DATE: NORMAL
HBA1C MFR BLD: 8.8 % (ref 4.5–5.7)
Lab: ABNORMAL
Lab: NORMAL
POC ALBUMIN, URINE: 30 MG/L
POC CREATININE, URINE: 100 MG/DL
POC URINE ALB/CREA RATIO: NORMAL

## 2025-04-25 RX ORDER — SIMVASTATIN 20 MG
20 TABLET ORAL EVERY EVENING
Qty: 30 TABLET | Refills: 3 | Status: SHIPPED | OUTPATIENT
Start: 2025-04-25 | End: 2025-04-25 | Stop reason: SDUPTHER

## 2025-04-25 RX ORDER — SIMVASTATIN 20 MG
20 TABLET ORAL EVERY EVENING
Qty: 30 TABLET | Refills: 3 | Status: SHIPPED | OUTPATIENT
Start: 2025-04-25

## 2025-04-25 NOTE — PROGRESS NOTES
"                      Established Patient        Chief Complaint:   Chief Complaint   Patient presents with    Diabetes     Pt is here for 3 month follow up            History of Present Illness:    Silva Maldonado is a 64 y.o. female who presents today for follow up of HTN, DM.    HTN  Losartan 25mg daily. Takes daily. Does not monitor bp's at home unless she feels like something is going on. She doesn't feel like her bp's run high. Denies chest pain, SOB, Palpitations.    DM2  She checks it a couple times a week. Fastings run 140. She reports snacking at night. She inspects her feet regularly.    Current fatigue. Some days worse than others. She sleeps well. She has never had a sleep study.     Sx on finger January 30th. Recovering from osteomyelitis of right index finger. She denies any current complications.   Subjective     The following portions of the patient's history were reviewed and updated as appropriate: allergies, current medications, past family history, past medical history, past social history, past surgical history and problem list.    ALLERGIES  No Known Allergies    Review of Systems   Constitutional:  Negative for unexpected weight loss.   Eyes:  Negative for blurred vision.   Endocrine: Negative for polydipsia and polyuria.   Neurological:  Negative for tremors, speech difficulty and confusion.       Objective     Vital Signs:   /82   Pulse 74   Resp 16   Ht 160 cm (63\")   Wt 102 kg (225 lb 9.6 oz)   SpO2 94%   BMI 39.96 kg/m²       Physical Exam   Physical Exam  Vitals and nursing note reviewed.   HENT:      Mouth/Throat:      Lips: Pink.   Eyes:      General: Lids are normal.   Cardiovascular:      Rate and Rhythm: Normal rate and regular rhythm.   Pulmonary:      Effort: Pulmonary effort is normal.      Breath sounds: Normal breath sounds.   Neurological:      Mental Status: She is alert and oriented to person, place, and time.      Gait: Gait is intact.   Psychiatric:   "       Attention and Perception: Attention normal.         Mood and Affect: Mood and affect normal.         Speech: Speech normal.         Behavior: Behavior normal. Behavior is cooperative.         Assessment and Plan      Assessment/Plan:   Diagnoses and all orders for this visit:    1. Type 2 diabetes mellitus with hyperglycemia, without long-term current use of insulin (Primary)  -     POC Glycosylated Hemoglobin (Hb A1C)  -     POC Albumin/Creatinine Ratio Urine    2. Primary hypertension    3. Fatigue, unspecified type  -     Iron and TIBC  -     Ferritin  -     TSH  -     T4, free    4. Vitamin D deficiency  -     Vitamin D 25 hydroxy    5. Vitamin B12 deficiency  -     Vitamin B12    6. Amputation of tip of finger, sequela    7. Mixed hyperlipidemia  -     Discontinue: simvastatin (ZOCOR) 20 MG tablet; Take 1 tablet by mouth Every Evening.  Dispense: 30 tablet; Refill: 3  -     simvastatin (ZOCOR) 20 MG tablet; Take 1 tablet by mouth Every Evening.  Dispense: 30 tablet; Refill: 3    8. Personal history of tobacco use, presenting hazards to health    9. History of nicotine dependence  -     CT chest low dose wo; Future    Risks, benefits, and potential side effects of current/new medications reviewed with patient.  Patient voiced understanding and wished to proceed with treatment.    Discussed need for reduction in sodium/salt/caffeine intake; improve sleep habits as able; inc formal CV exercise program with goal of vigorous activity most, if not all, days of the week.     Ambulatory blood pressure monitoring encouraged prior to taking medication daily and as needed.    Contact office for symptomatic HTN or consistently uncontrolled HTN.     Patient to increase dietary intake of vegetables, fruits, nuts, whole grains and fish. Decrease dietary intake of carbs, processed foods such as lunch meats, saturated fats, sugary beverages.     Increase exercise regimen as tolerated toward a goal of 120-150 minutes/week.      Will continue medications as prescribed for now. Due to recent osteomyelitis and partial amputation of right index finger on 1/30, patient likely has experienced worsening hyperglycemia due to infection and inflammatory response. Antibiotics continued until March. Will reevaluate in 3 months.    Patient to present to ED for chest pain, confusion, vision disturbance.     Encouraged ambulatory glucose monitoring. Patient to call office or RTC for glucose levels consistently greater than 300 or poorly controlled with current medication regimen.     Discussed need for stress/anxiety reducing techniques such as prayer/meditation/breathing and counting exercises and avoidance of stress producing environments/situations; will follow clinically.    Patient was encouraged to keep me informed of any acute changes, lack of improvement, or any new concerning symptoms.      Discussion Summary:  Discussed plan of care in detail with pt today; pt verb understanding and agrees.    I, ROSY Cruz, personally performed the services described in this documentation, as scribed by Myra Vasquez, P student, in my presence, and is both accurate and complete.       I have reviewed and updated all copied forward information, as appropriate.  I attest to the accuracy and relevance of any unchanged information.      Follow up:  Return in about 3 months (around 7/25/2025).     Patient Education:  Patient Instructions   Please review the decision aid used during our discussion regarding the Low dose lung cancer screening visit today.                          Please review the decision aid used during our discussion regarding the Low dose lung cancer screening visit today.                            ROSY Cruz  05/13/25  15:35 EDT          Please note that portions of this note may have been completed with a voice recognition program.

## 2025-04-26 LAB
25(OH)D3+25(OH)D2 SERPL-MCNC: 13 NG/ML (ref 30–100)
FERRITIN SERPL-MCNC: 184 NG/ML (ref 13–150)
IRON SATN MFR SERPL: 11 % (ref 20–50)
IRON SERPL-MCNC: 46 MCG/DL (ref 37–145)
T4 FREE SERPL-MCNC: 1.39 NG/DL (ref 0.92–1.68)
TIBC SERPL-MCNC: 428 MCG/DL
TSH SERPL DL<=0.005 MIU/L-ACNC: 1.38 UIU/ML (ref 0.27–4.2)
UIBC SERPL-MCNC: 382 MCG/DL (ref 112–346)
VIT B12 SERPL-MCNC: 345 PG/ML (ref 211–946)

## 2025-05-09 RX ORDER — SEMAGLUTIDE 2.68 MG/ML
2 INJECTION, SOLUTION SUBCUTANEOUS WEEKLY
Qty: 3 ML | Refills: 1 | Status: SHIPPED | OUTPATIENT
Start: 2025-05-09

## 2025-05-27 DIAGNOSIS — I10 PRIMARY HYPERTENSION: ICD-10-CM

## 2025-05-27 RX ORDER — LOSARTAN POTASSIUM 25 MG/1
25 TABLET ORAL DAILY
Qty: 30 TABLET | Refills: 2 | Status: SHIPPED | OUTPATIENT
Start: 2025-05-27

## 2025-05-27 NOTE — TELEPHONE ENCOUNTER
Rx Refill Note  Requested Prescriptions     Signed Prescriptions Disp Refills    losartan (COZAAR) 25 MG tablet 30 tablet 2     Sig: Take 1 tablet by mouth Daily.     Authorizing Provider: STEVE CASAREZ     Ordering User: JENNIE DELUNA      Last office visit with prescribing clinician: 4/25/2025   Last telemedicine visit with prescribing clinician: Visit date not found   Next office visit with prescribing clinician: 7/28/2025                         Would you like a call back once the refill request has been completed: [] Yes [] No    If the office needs to give you a call back, can they leave a voicemail: [] Yes [] No    Jennie Deluna MA  05/27/25, 17:46 EDT

## 2025-05-30 ENCOUNTER — HOSPITAL ENCOUNTER (OUTPATIENT)
Dept: CT IMAGING | Facility: HOSPITAL | Age: 65
Discharge: HOME | End: 2025-05-30
Admitting: NURSE PRACTITIONER
Payer: COMMERCIAL

## 2025-05-30 DIAGNOSIS — Z87.891 HISTORY OF NICOTINE DEPENDENCE: ICD-10-CM

## 2025-05-30 PROCEDURE — 71271 CT THORAX LUNG CANCER SCR C-: CPT

## 2025-06-05 ENCOUNTER — APPOINTMENT (OUTPATIENT)
Dept: GENERAL RADIOLOGY | Facility: HOSPITAL | Age: 65
End: 2025-06-05
Payer: COMMERCIAL

## 2025-06-05 ENCOUNTER — APPOINTMENT (OUTPATIENT)
Dept: CT IMAGING | Facility: HOSPITAL | Age: 65
End: 2025-06-05
Payer: COMMERCIAL

## 2025-06-05 ENCOUNTER — HOSPITAL ENCOUNTER (EMERGENCY)
Facility: HOSPITAL | Age: 65
Discharge: HOME | End: 2025-06-05
Attending: EMERGENCY MEDICINE
Payer: COMMERCIAL

## 2025-06-05 VITALS
OXYGEN SATURATION: 96 % | HEIGHT: 63 IN | DIASTOLIC BLOOD PRESSURE: 91 MMHG | WEIGHT: 220 LBS | HEART RATE: 81 BPM | SYSTOLIC BLOOD PRESSURE: 164 MMHG | TEMPERATURE: 97.5 F | RESPIRATION RATE: 18 BRPM | BODY MASS INDEX: 38.98 KG/M2

## 2025-06-05 DIAGNOSIS — R07.9 CHEST PAIN, UNSPECIFIED TYPE: Primary | ICD-10-CM

## 2025-06-05 LAB
ALBUMIN SERPL-MCNC: 3.9 G/DL (ref 3.5–5.2)
ALBUMIN/GLOB SERPL: 1.5 G/DL
ALP SERPL-CCNC: 93 U/L (ref 39–117)
ALT SERPL W P-5'-P-CCNC: 16 U/L (ref 1–33)
ANION GAP SERPL CALCULATED.3IONS-SCNC: 12.7 MMOL/L (ref 5–15)
AST SERPL-CCNC: 14 U/L (ref 1–32)
BASOPHILS # BLD AUTO: 0.08 10*3/MM3 (ref 0–0.2)
BASOPHILS NFR BLD AUTO: 0.8 % (ref 0–1.5)
BILIRUB SERPL-MCNC: 0.3 MG/DL (ref 0–1.2)
BUN SERPL-MCNC: 17 MG/DL (ref 8–23)
BUN/CREAT SERPL: 20.5 (ref 7–25)
CALCIUM SPEC-SCNC: 9.2 MG/DL (ref 8.6–10.5)
CHLORIDE SERPL-SCNC: 102 MMOL/L (ref 98–107)
CO2 SERPL-SCNC: 19.3 MMOL/L (ref 22–29)
CREAT SERPL-MCNC: 0.83 MG/DL (ref 0.57–1)
DEPRECATED RDW RBC AUTO: 34.5 FL (ref 37–54)
EGFRCR SERPLBLD CKD-EPI 2021: 79.8 ML/MIN/1.73
EOSINOPHIL # BLD AUTO: 0.39 10*3/MM3 (ref 0–0.4)
EOSINOPHIL NFR BLD AUTO: 3.9 % (ref 0.3–6.2)
ERYTHROCYTE [DISTWIDTH] IN BLOOD BY AUTOMATED COUNT: 12.4 % (ref 12.3–15.4)
GEN 5 1HR TROPONIN T REFLEX: 14 NG/L
GLOBULIN UR ELPH-MCNC: 2.6 GM/DL
GLUCOSE SERPL-MCNC: 288 MG/DL (ref 65–99)
HCT VFR BLD AUTO: 39.6 % (ref 34–46.6)
HGB BLD-MCNC: 13.5 G/DL (ref 12–15.9)
HOLD SPECIMEN: NORMAL
HOLD SPECIMEN: NORMAL
IMM GRANULOCYTES # BLD AUTO: 0.09 10*3/MM3 (ref 0–0.05)
IMM GRANULOCYTES NFR BLD AUTO: 0.9 % (ref 0–0.5)
LYMPHOCYTES # BLD AUTO: 2.04 10*3/MM3 (ref 0.7–3.1)
LYMPHOCYTES NFR BLD AUTO: 20.3 % (ref 19.6–45.3)
MAGNESIUM SERPL-MCNC: 2.5 MG/DL (ref 1.6–2.4)
MCH RBC QN AUTO: 26.6 PG (ref 26.6–33)
MCHC RBC AUTO-ENTMCNC: 34.1 G/DL (ref 31.5–35.7)
MCV RBC AUTO: 78 FL (ref 79–97)
MONOCYTES # BLD AUTO: 0.67 10*3/MM3 (ref 0.1–0.9)
MONOCYTES NFR BLD AUTO: 6.7 % (ref 5–12)
NEUTROPHILS NFR BLD AUTO: 6.8 10*3/MM3 (ref 1.7–7)
NEUTROPHILS NFR BLD AUTO: 67.4 % (ref 42.7–76)
NRBC BLD AUTO-RTO: 0 /100 WBC (ref 0–0.2)
NT-PROBNP SERPL-MCNC: 83.4 PG/ML (ref 0–900)
PLATELET # BLD AUTO: 301 10*3/MM3 (ref 140–450)
PMV BLD AUTO: 9.9 FL (ref 6–12)
POTASSIUM SERPL-SCNC: 4.2 MMOL/L (ref 3.5–5.2)
PROT SERPL-MCNC: 6.5 G/DL (ref 6–8.5)
RBC # BLD AUTO: 5.08 10*6/MM3 (ref 3.77–5.28)
SODIUM SERPL-SCNC: 134 MMOL/L (ref 136–145)
TROPONIN T % DELTA: 0
TROPONIN T NUMERIC DELTA: 0 NG/L
TROPONIN T SERPL HS-MCNC: 14 NG/L
WBC NRBC COR # BLD AUTO: 10.07 10*3/MM3 (ref 3.4–10.8)
WHOLE BLOOD HOLD COAG: NORMAL
WHOLE BLOOD HOLD SPECIMEN: NORMAL

## 2025-06-05 PROCEDURE — 83880 ASSAY OF NATRIURETIC PEPTIDE: CPT

## 2025-06-05 PROCEDURE — 71275 CT ANGIOGRAPHY CHEST: CPT

## 2025-06-05 PROCEDURE — 85025 COMPLETE CBC W/AUTO DIFF WBC: CPT

## 2025-06-05 PROCEDURE — 93005 ELECTROCARDIOGRAM TRACING: CPT | Performed by: EMERGENCY MEDICINE

## 2025-06-05 PROCEDURE — 84484 ASSAY OF TROPONIN QUANT: CPT | Performed by: EMERGENCY MEDICINE

## 2025-06-05 PROCEDURE — 99285 EMERGENCY DEPT VISIT HI MDM: CPT | Performed by: EMERGENCY MEDICINE

## 2025-06-05 PROCEDURE — 25010000002 ONDANSETRON PER 1 MG

## 2025-06-05 PROCEDURE — 36415 COLL VENOUS BLD VENIPUNCTURE: CPT

## 2025-06-05 PROCEDURE — 80053 COMPREHEN METABOLIC PANEL: CPT | Performed by: EMERGENCY MEDICINE

## 2025-06-05 PROCEDURE — 71045 X-RAY EXAM CHEST 1 VIEW: CPT

## 2025-06-05 PROCEDURE — 25510000001 IOPAMIDOL 61 % SOLUTION: Performed by: EMERGENCY MEDICINE

## 2025-06-05 PROCEDURE — 96375 TX/PRO/DX INJ NEW DRUG ADDON: CPT

## 2025-06-05 PROCEDURE — 96374 THER/PROPH/DIAG INJ IV PUSH: CPT

## 2025-06-05 PROCEDURE — 25010000002 MORPHINE PER 10 MG: Performed by: EMERGENCY MEDICINE

## 2025-06-05 PROCEDURE — 25010000002 ONDANSETRON PER 1 MG: Performed by: EMERGENCY MEDICINE

## 2025-06-05 PROCEDURE — 83735 ASSAY OF MAGNESIUM: CPT

## 2025-06-05 PROCEDURE — 93005 ELECTROCARDIOGRAM TRACING: CPT

## 2025-06-05 RX ORDER — ASPIRIN 325 MG
325 TABLET ORAL ONCE
Status: COMPLETED | OUTPATIENT
Start: 2025-06-05 | End: 2025-06-05

## 2025-06-05 RX ORDER — ONDANSETRON 2 MG/ML
4 INJECTION INTRAMUSCULAR; INTRAVENOUS ONCE
Status: DISCONTINUED | OUTPATIENT
Start: 2025-06-05 | End: 2025-06-05 | Stop reason: HOSPADM

## 2025-06-05 RX ORDER — IOPAMIDOL 612 MG/ML
100 INJECTION, SOLUTION INTRAVASCULAR
Status: COMPLETED | OUTPATIENT
Start: 2025-06-05 | End: 2025-06-05

## 2025-06-05 RX ORDER — SODIUM CHLORIDE 0.9 % (FLUSH) 0.9 %
10 SYRINGE (ML) INJECTION AS NEEDED
Status: DISCONTINUED | OUTPATIENT
Start: 2025-06-05 | End: 2025-06-05 | Stop reason: HOSPADM

## 2025-06-05 RX ORDER — ONDANSETRON 2 MG/ML
4 INJECTION INTRAMUSCULAR; INTRAVENOUS ONCE
Status: COMPLETED | OUTPATIENT
Start: 2025-06-05 | End: 2025-06-05

## 2025-06-05 RX ADMIN — MORPHINE SULFATE 4 MG: 4 INJECTION, SOLUTION INTRAMUSCULAR; INTRAVENOUS at 19:10

## 2025-06-05 RX ADMIN — ONDANSETRON 4 MG: 2 INJECTION INTRAMUSCULAR; INTRAVENOUS at 19:07

## 2025-06-05 RX ADMIN — IOPAMIDOL 100 ML: 612 INJECTION, SOLUTION INTRAVENOUS at 18:58

## 2025-06-05 RX ADMIN — ASPIRIN 325 MG ORAL TABLET 325 MG: 325 PILL ORAL at 17:42

## 2025-06-05 NOTE — ED PROVIDER NOTES
" EMERGENCY DEPARTMENT ENCOUNTER    Pt Name: Silva Maldonado  MRN: [id number]  Pt :   1960  Room Number:    Date of encounter:  2025  PCP: Sharmila Oconnell APRN  ED Provider: Salvatore Maldonado PA-C    Historian: Patient, patient's  at bedside, nursing notes      HPI:  Chief Complaint: Chest pain        Context: Silva Maldonado is a 64 y.o. female who presents to the ED c/o chest pain since yesterday afternoon.  Patient states the pain has been largely intermittent but chest pain will occasionally radiate to her back and into her right arm and she describes a right arm \"squeezing feeling\" starting today.  Patient does report history of hypertension, type 2 diabetes.  She reports taking losartan daily for blood pressure.  Patient denies any lightheadedness or dizziness, vision changes, extremity numbness, extremity weakness, abdominal pain, shortness of breath or any other complaint.      PAST MEDICAL HISTORY  Past Medical History:   Diagnosis Date    Cholelithiasis     Removed in     Colon polyp 2019    Recheck i 3 years    Diabetes mellitus 2017    type II    Diverticulosis 2007    Elevated cholesterol     Gestational hypertension 1999    Hepatomegaly 2024    Hypertension     Kidney stone 26    Multiple gestation     1 still born, 2 live burths    Obesity     Ovarian cyst     Pelvic prolapse     Preeclampsia     UTI (urinary tract infection) 2024    Varicella 1968         PAST SURGICAL HISTORY  Past Surgical History:   Procedure Laterality Date    CHOLECYSTECTOMY      COLONOSCOPY  2019    COLONOSCOPY N/A 2023    Procedure: COLONOSCOPY with polypectomy x 5;  Surgeon: Breanne Smith MD;  Location: [facility] ENDOSCOPY;  Service: Gastroenterology;  Laterality: N/A;    KNEE ARTHROSCOPY Left     LAPAROSCOPIC CHOLECYSTECTOMY      TONSILLECTOMY      TUBAL ABDOMINAL LIGATION  2006    VAGINAL PROLAPSE REPAIR   "         FAMILY HISTORY  Family History   Problem Relation Age of Onset    Cervical cancer Mother     Hyperlipidemia Mother     Stroke Mother     Hypertension Mother     Cancer Father         Brain tumor - unsure if primary or not    Hyperlipidemia Father     Arthritis Brother     Lung cancer Brother     Heart disease Brother     Hyperlipidemia Brother     Hypertension Brother     Diabetes Brother     Hyperlipidemia Brother     Kidney disease Brother     Hypertension Brother     Heart disease Brother     Hyperlipidemia Brother     Hypertension Brother     Hyperlipidemia Brother     Celiac disease Daughter     Fibromyalgia Daughter     Sjogren's syndrome Daughter     Colon cancer Neg Hx     Breast cancer Neg Hx          SOCIAL HISTORY  Social History     Socioeconomic History    Marital status:    Tobacco Use    Smoking status: Former     Current packs/day: 0.00     Average packs/day: 1.5 packs/day for 25.6 years (38.4 ttl pk-yrs)     Types: Cigarettes     Start date: 1986     Quit date: 2012     Years since quittin.4     Passive exposure: Past    Smokeless tobacco: Never   Vaping Use    Vaping status: Never Used   Substance and Sexual Activity    Alcohol use: Never    Drug use: Never    Sexual activity: Yes     Partners: Male     Birth control/protection: Post-menopausal, Tubal ligation         ALLERGIES  Patient has no known allergies.        REVIEW OF SYSTEMS  Review of Systems   Constitutional:  Negative for chills and fever.   HENT:  Negative for congestion and sore throat.    Respiratory:  Positive for chest tightness. Negative for cough and shortness of breath.    Cardiovascular:  Positive for chest pain. Negative for leg swelling.   Gastrointestinal:  Negative for abdominal pain, nausea and vomiting.   Genitourinary:  Negative for dysuria.   Musculoskeletal:  Positive for back pain.   Skin:  Negative for wound.   Neurological:  Negative for dizziness and headaches.   Psychiatric/Behavioral:   Negative for confusion.    All other systems reviewed and are negative.         All systems reviewed and negative except for those discussed in HPI.       PHYSICAL EXAM    I have reviewed the triage vital signs and nursing notes.    ED Triage Vitals [06/05/25 1609]   Temp Heart Rate Resp BP SpO2   97.5 °F (36.4 °C) 87 18 (!) 184/96 96 %      Temp src Heart Rate Source Patient Position BP Location FiO2 (%)   Oral Monitor -- Left arm --       Physical Exam  Vitals and nursing note reviewed.   Constitutional:       General: She is not in acute distress.     Appearance: She is not ill-appearing, toxic-appearing or diaphoretic.   HENT:      Head: Normocephalic and atraumatic.      Mouth/Throat:      Mouth: Mucous membranes are moist.      Pharynx: Oropharynx is clear.   Eyes:      Extraocular Movements: Extraocular movements intact.   Cardiovascular:      Rate and Rhythm: Normal rate.      Pulses:           Radial pulses are 2+ on the right side and 2+ on the left side.      Heart sounds: Normal heart sounds.   Pulmonary:      Effort: Pulmonary effort is normal. No tachypnea, accessory muscle usage or respiratory distress.      Breath sounds: Normal breath sounds. No decreased breath sounds, wheezing, rhonchi or rales.   Chest:      Chest wall: No tenderness.   Abdominal:      Tenderness: There is no abdominal tenderness.   Skin:     General: Skin is warm and dry.      Findings: No rash.   Neurological:      Mental Status: She is alert.             LAB RESULTS  Recent Results (from the past 24 hours)   Comprehensive Metabolic Panel    Collection Time: 06/05/25  4:25 PM    Specimen: Blood   Result Value Ref Range    Glucose 288 (H) 65 - 99 mg/dL    BUN 17.0 8.0 - 23.0 mg/dL    Creatinine 0.83 0.57 - 1.00 mg/dL    Sodium 134 (L) 136 - 145 mmol/L    Potassium 4.2 3.5 - 5.2 mmol/L    Chloride 102 98 - 107 mmol/L    CO2 19.3 (L) 22.0 - 29.0 mmol/L    Calcium 9.2 8.6 - 10.5 mg/dL    Total Protein 6.5 6.0 - 8.5 g/dL    Albumin  3.9 3.5 - 5.2 g/dL    ALT (SGPT) 16 1 - 33 U/L    AST (SGOT) 14 1 - 32 U/L    Alkaline Phosphatase 93 39 - 117 U/L    Total Bilirubin 0.3 0.0 - 1.2 mg/dL    Globulin 2.6 gm/dL    A/G Ratio 1.5 g/dL    BUN/Creatinine Ratio 20.5 7.0 - 25.0    Anion Gap 12.7 5.0 - 15.0 mmol/L    eGFR 79.8 >60.0 mL/min/1.73   High Sensitivity Troponin T    Collection Time: 06/05/25  4:25 PM    Specimen: Blood   Result Value Ref Range    HS Troponin T 14 (H) <14 ng/L   Green Top (Gel)    Collection Time: 06/05/25  4:25 PM   Result Value Ref Range    Extra Tube Hold for add-ons.    Lavender Top    Collection Time: 06/05/25  4:25 PM   Result Value Ref Range    Extra Tube hold for add-on    Gold Top - SST    Collection Time: 06/05/25  4:25 PM   Result Value Ref Range    Extra Tube Hold for add-ons.    Light Blue Top    Collection Time: 06/05/25  4:25 PM   Result Value Ref Range    Extra Tube Hold for add-ons.    CBC Auto Differential    Collection Time: 06/05/25  4:25 PM    Specimen: Blood   Result Value Ref Range    WBC 10.07 3.40 - 10.80 10*3/mm3    RBC 5.08 3.77 - 5.28 10*6/mm3    Hemoglobin 13.5 12.0 - 15.9 g/dL    Hematocrit 39.6 34.0 - 46.6 %    MCV 78.0 (L) 79.0 - 97.0 fL    MCH 26.6 26.6 - 33.0 pg    MCHC 34.1 31.5 - 35.7 g/dL    RDW 12.4 12.3 - 15.4 %    RDW-SD 34.5 (L) 37.0 - 54.0 fl    MPV 9.9 6.0 - 12.0 fL    Platelets 301 140 - 450 10*3/mm3    Neutrophil % 67.4 42.7 - 76.0 %    Lymphocyte % 20.3 19.6 - 45.3 %    Monocyte % 6.7 5.0 - 12.0 %    Eosinophil % 3.9 0.3 - 6.2 %    Basophil % 0.8 0.0 - 1.5 %    Immature Grans % 0.9 (H) 0.0 - 0.5 %    Neutrophils, Absolute 6.80 1.70 - 7.00 10*3/mm3    Lymphocytes, Absolute 2.04 0.70 - 3.10 10*3/mm3    Monocytes, Absolute 0.67 0.10 - 0.90 10*3/mm3    Eosinophils, Absolute 0.39 0.00 - 0.40 10*3/mm3    Basophils, Absolute 0.08 0.00 - 0.20 10*3/mm3    Immature Grans, Absolute 0.09 (H) 0.00 - 0.05 10*3/mm3    nRBC 0.0 0.0 - 0.2 /100 WBC   BNP    Collection Time: 06/05/25  4:25 PM     Specimen: Blood   Result Value Ref Range    proBNP 83.4 0.0 - 900.0 pg/mL   Magnesium    Collection Time: 06/05/25  4:25 PM    Specimen: Blood   Result Value Ref Range    Magnesium 2.5 (H) 1.6 - 2.4 mg/dL   High Sensitivity Troponin T 1Hr    Collection Time: 06/05/25  5:49 PM    Specimen: Blood   Result Value Ref Range    HS Troponin T 14 (H) <14 ng/L    Troponin T Numeric Delta 0 ng/L    Troponin T % Delta 0 Abnormal if >/= 20%       If labs were ordered, I independently reviewed the results and considered them in treating the patient.        RADIOLOGY  CT Angiogram Chest  Result Date: 6/5/2025  FINAL REPORT TECHNIQUE: null CLINICAL HISTORY: CP radiating to back and right arm, eval dissection COMPARISON: null FINDINGS: CT angiography chest with contrast. 3D Postprocessing. Comparison: None Findings: The heart is normal size. RV/LV ratio is normal. Unremarkable thoracic aorta and great vessels. No aneurysm. No acute pulmonary embolus. The visualized thyroid and mediastinum are unremarkable. The lungs are clear. The appearance of the liver suggests fatty infiltration. The upper abdomen is otherwise unremarkable. The bones are intact.     IMPRESSION: 1. No pulmonary emboli. Authenticated and Electronically Signed by Stefan Jonas on 06/05/2025 07:47:51 PM      I ordered and independently reviewed the above noted radiographic studies.      I viewed images of chest x-ray which showed no acute cardiopulmonary process per my independent interpretation.    I viewed images of CTA chest which showed no PE, no dissection, no pneumonia per my independent interpretation    See radiologist's dictation for official interpretation.        PROCEDURES    Procedures    ECG 12 Lead ED Triage Standing Order; Chest Pain   Final Result          MEDICATIONS GIVEN IN ER    Medications   aspirin tablet 325 mg (325 mg Oral Given 6/5/25 1742)   morphine injection 4 mg (4 mg Intravenous Given 6/5/25 1910)   ondansetron (ZOFRAN) injection 4 mg  (4 mg Intravenous Given 6/5/25 1907)   iopamidol (ISOVUE-300) 61 % injection 100 mL (100 mL Intravenous Given 6/5/25 1858)         MEDICAL DECISION MAKING, PROGRESS, and CONSULTS    All labs, if obtained, have been independently reviewed by me.  All radiology studies, if obtained, have been reviewed by me and the radiologist dictating the report.  All EKG's, if obtained, have been independently viewed and interpreted by me/my attending physician.      Discussion below represents my analysis of pertinent findings related to patient's condition, differential diagnosis, treatment plan and final disposition.    Patient is a 64-year-old female presenting for evaluation of chest pain since yesterday evening.  On arrival today the patient is upright alert oriented no acute distress resting comfortably in exam chair.  She is hypertensive with a blood pressure 184/96 the rest of her vital signs are stable per my independent interpretation.  Chest pain is not reproducible to palpation.  Her bilateral radial pulses are equal and 2+.  Abdomen soft and nontender.  No other positive physical exam findings with no peripheral edema noted.  Plan will be for extensive laboratory workup and cardiac workup to include an EKG and a CTA chest scan as the patient is complaining of her chest pain radiating to her back and shoulder will rule out dissection.    EKG showed no acute ischemic changes normal sinus rhythm normal rate per ED attending interpretation with no T wave inversions.  2 high-sensitivity troponins were stable at 14 lowering any suspicion for acute coronary syndrome.  Labs revealed no leukocytosis or anemia.  CMP largely clinically unremarkable except for elevated glucose of 288 with a normal anion gap.  Normal kidney function no elevation in LFTs and normal BNP lowering any suspicion of heart failure.  CTA chest negative for dissection or other acute cardiopulmonary process at this time.  On reevaluation after morphine and  Zofran patient's chest pain has improved.  Given overall reassuring workup and a reduction in her blood pressure to a more normal range, I do feel the patient is now stable for discharge with outpatient cardiology clinic referral for chest pain clinic.  Strict ED return cautions were explained and the patient verbalized understanding and agreed with this plan of care.        HEART Score: 3                Differential diagnosis:    Differential diagnosis included but was not limited to acute coronary syndrome, aortic dissection, PE, pneumonia, pleurisy, gastritis, GERD, arrhythmia, electrolyte derangement, anxiety      Additional sources:    - Discussed/ obtained information from independent historians: Patient's  at bedside    - External (non-ED) record review: Previous medical records reviewed    - Chronic or social conditions impacting care: Obesity    Orders placed during this visit:  Orders Placed This Encounter   Procedures    XR Chest 1 View    CT Angiogram Chest    [place] Draw    Comprehensive Metabolic Panel    High Sensitivity Troponin T    CBC Auto Differential    High Sensitivity Troponin T 1Hr    BNP    Magnesium    Ambulatory Referral to Cardiology for Chest Pain    Undress & Gown    Continuous Pulse Oximetry    ECG 12 Lead ED Triage Standing Order; Chest Pain    CBC & Differential    Green Top (Gel)    Lavender Top    Gold Top - SST    Light Blue Top         Additional orders considered but not ordered: None      ED Course:    Consultants: None    ED Course as of 06/06/25 0206   Thu Jun 05, 2025   8379 EKG: I reviewed and independently interpreted the EKG as:  Sinus rhythm rate of 82 bpm, left axis deviation, normal intervals, no ST elevation, no T wave inversions [CS]      ED Course User Index  [CS] Tushar Pettit MD              Shared Decision Making:  After my consideration of clinical presentation and any laboratory/radiology studies obtained, I discussed the findings with the  patient/patient representative who is in agreement with the treatment plan and the final disposition.   Risks and benefits of discharge and/or observation/admission were discussed.       AS OF 02:06 EDT VITALS:    BP - 164/91  HR - 81  TEMP - 97.5 °F (36.4 °C) (Oral)  O2 SATS - 96%                  DIAGNOSIS  Final diagnoses:   Chest pain, unspecified type         DISPOSITION  Discharge      Please note that portions of this document were completed with voice recognition software.      Salvatore Maldonado PA-C  06/06/25 0207

## 2025-06-06 NOTE — DISCHARGE INSTRUCTIONS
Follow up with your PCP about your blood pressure and symptoms, and with Cardiology office in our outpatient chest pain clinic.  Return to the ER for any new or worsening symptoms

## 2025-06-09 NOTE — PROGRESS NOTES
"    [facility]    Office Consult     Silva Maldonado  [id number]  06/10/2025    Referred By: Salvatore Maldonado PA-C    Chief Complaint   Patient presents with    Chest Pain     With pressure radiating into right shoulder and jaw    Fatigue       Subjective     History of Present Illness:   Silva Maldonado is a 64 y.o. female who presents to the Cardiology Clinic for evaluation of chest pain after a recent emergency department visit.  Past medical history significant for hypertension, hyperlipidemia, LIMON, former smoker, and diabetes mellitus type 2.  Patient presented with chest pain that had been largely intermittent but chest pain with occasional radiate to her back and into her right arm and she describes a right arm \"squeezing feeling.\"  Patient was hypertensive on arrival.  Workup in the ER was reassuring with labs grossly unremarkable with exception of glucose with known history of diabetes.  Troponins were normal.  CTA to rule out PE was normal.  EKG without any acute changes.  She was discharged home to follow up outpatient.      Since being discharged patient states she has continued to have pain in her central chest intermittently, no provoking or relieving symptoms.  Does endorse some associated shortness of breath.  Symptoms started last week, states she has had a \"twinge\" here and there but became more consistent last week.  Positive family history of heart disease in \"just about everybody.\"  States her blood pressures have been running in 160s at home recently over the past 5-6 months.  No current symptoms.  States over the past few weeks she has noticed dyspnea on exertion with activity such as housework.  Had an echocardiogram in November 2024 which noted normal EF and no significant valve abnormalities with osteomyelitis infection prompting test.  States she does have some lower extremity swelling that is relieved with elevation.  States she does have some " palpitations lasting 10-15min around once/ week recently.  States she has also had a lot of fatigue.        Review of Systems   Constitutional:  Negative for activity change and fatigue.   Respiratory:  Positive for shortness of breath. Negative for chest tightness.    Cardiovascular:  Positive for chest pain and palpitations. Negative for leg swelling.   Neurological:  Negative for dizziness.   All other systems reviewed and are negative.       Past Medical History:   Diagnosis Date    Cholelithiasis     Removed in 1996    Colon polyp 12/2019    Recheck i 3 years    Diabetes mellitus 2017    type II    Diverticulosis 2007    Elevated cholesterol     Gestational hypertension 9/1999    Hepatomegaly 12/9/2024    Hypertension     Kidney stone 8/20/24    Multiple gestation 1999    1 still born, 2 live burths    Obesity     Ovarian cyst 1999    Pelvic prolapse 2001    Preeclampsia 1999    UTI (urinary tract infection) 11/14/2024    Varicella 1968       Past Surgical History:   Procedure Laterality Date    CHOLECYSTECTOMY  1998    COLONOSCOPY  12/27/2019    COLONOSCOPY N/A 05/04/2023    Procedure: COLONOSCOPY with polypectomy x 5;  Surgeon: Breanne Smith MD;  Location: [facility] ENDOSCOPY;  Service: Gastroenterology;  Laterality: N/A;    KNEE ARTHROSCOPY Left     LAPAROSCOPIC CHOLECYSTECTOMY  1998    TONSILLECTOMY  1983    TUBAL ABDOMINAL LIGATION  2006    VAGINAL PROLAPSE REPAIR  2006       Family History   Problem Relation Age of Onset    Cervical cancer Mother     Hyperlipidemia Mother     Stroke Mother     Hypertension Mother     Cancer Father         Brain tumor - unsure if primary or not    Hyperlipidemia Father     Arthritis Brother     Lung cancer Brother     Heart disease Brother     Hyperlipidemia Brother     Hypertension Brother     Diabetes Brother     Hyperlipidemia Brother     Kidney disease Brother     Hypertension Brother     Heart disease Brother     Hyperlipidemia Brother     Hypertension Brother      Hyperlipidemia Brother     Celiac disease Daughter     Fibromyalgia Daughter     Sjogren's syndrome Daughter     Colon cancer Neg Hx     Breast cancer Neg Hx        Social History     Socioeconomic History    Marital status:    Tobacco Use    Smoking status: Former     Current packs/day: 0.00     Average packs/day: 1.5 packs/day for 25.6 years (38.4 ttl pk-yrs)     Types: Cigarettes     Start date: 1986     Quit date: 2012     Years since quittin.4     Passive exposure: Past    Smokeless tobacco: Never   Vaping Use    Vaping status: Never Used   Substance and Sexual Activity    Alcohol use: Never    Drug use: Never    Sexual activity: Yes     Partners: Male     Birth control/protection: Post-menopausal, Tubal ligation         Current Outpatient Medications:     Blood Glucose Monitoring Suppl kit, 1 each 4 (Four) Times a Day., Disp: 1 each, Rfl: 0    cholecalciferol (VITAMIN D3) 250 MCG (38501 UT) capsule, Take 1 capsule by mouth Daily., Disp: 30 capsule, Rfl: 1    empagliflozin (Jardiance) 10 MG tablet tablet, Take 1 tablet by mouth Daily., Disp: 90 tablet, Rfl: 1    fenofibrate (TRICOR) 145 MG tablet, Take 1 tablet by mouth Daily., Disp: , Rfl:     glucose blood (Glucose Meter Test) test strip, Use as instructed, Disp: 200 each, Rfl: 12    Magnesium 250 MG tablet, Take 1 tablet by mouth At Night As Needed., Disp: , Rfl:     Semaglutide, 2 MG/DOSE, (Ozempic, 2 MG/DOSE,) 8 MG/3ML solution pen-injector, INJECT 2 MG UNDER THE SKIN EVERY WEEK, Disp: 3 mL, Rfl: 1    simvastatin (ZOCOR) 20 MG tablet, Take 1 tablet by mouth Every Evening., Disp: 30 tablet, Rfl: 3    diazePAM (Valium) 2 MG tablet, Take 1 tablet by mouth 2 (Two) Times a Day As Needed for Anxiety., Disp: 20 tablet, Rfl: 0    losartan (COZAAR) 50 MG tablet, Take 1 tablet by mouth Daily for 180 days., Disp: 30 tablet, Rfl: 5    No Known Allergies    Objective     Vitals:    06/10/25 0847   BP: 146/86   BP Location: Left arm   Patient  "Position: Sitting   Cuff Size: Adult   Pulse: 90   Resp: 19   SpO2: 99%   Weight: 100 kg (221 lb 6.4 oz)   Height: 160 cm (63\")     Body mass index is 39.22 kg/m².    Physical Exam  Vitals and nursing note reviewed.   Constitutional:       General: She is not in acute distress.     Appearance: Normal appearance. She is obese. She is not ill-appearing or toxic-appearing.   HENT:      Head: Normocephalic.      Mouth/Throat:      Mouth: Mucous membranes are moist.   Eyes:      Pupils: Pupils are equal, round, and reactive to light.   Cardiovascular:      Rate and Rhythm: Normal rate and regular rhythm.      Pulses: Normal pulses.      Heart sounds: Normal heart sounds. No murmur heard.  Pulmonary:      Effort: Pulmonary effort is normal.      Breath sounds: Normal breath sounds. No wheezing, rhonchi or rales.   Musculoskeletal:      Right lower leg: No edema.      Left lower leg: No edema.   Skin:     General: Skin is warm and dry.      Capillary Refill: Capillary refill takes less than 2 seconds.   Neurological:      Mental Status: She is alert and oriented to person, place, and time. Mental status is at baseline.   Psychiatric:         Mood and Affect: Mood normal.         Behavior: Behavior normal.         Thought Content: Thought content normal.         Results Review:   I reviewed the patient's new clinical results.      ECG 12 Lead    Date/Time: 6/10/2025 9:41 AM  Performed by: Ny Carrington APRN    Authorized by: Ny Carrington APRN  Comparison: not compared with previous ECG   Rhythm: sinus rhythm  Ectopy: atrial premature contractions  Rate: normal  Conduction: left anterior fascicular block    Clinical impression: abnormal EKG  Clinical impression comment: inferior infarct age undetermined          Assessment & Plan  Hospital discharge follow-up         Chest pain, atypical  -Workup  in the ER reassuring  -Continued intermittent episodes of chest pain with typical and atypical features and several " risk factors/ family history present  -No prior ischemic workup  -Will proceed with nuclear stress test to rule out ischemia  -Echocardiogram from 11/2024 with normal EF and no valve abnormalities  -Follow up in 5 weeks with MD to discuss testing results  -Go to the ER with any new or changing symptoms  Orders:    Stress Test With Myocardial Perfusion (2 Day); Future    Holter Monitor - 72 Hour Up To 15 Days; Future    ECG 12 Lead    Palpitations  -Noted episodes of heart racing recently and increased fatigue  -PACs noted on EKG  -Will place holter monitor to assess for arrhythmia vs ectopy  Orders:    Holter Monitor - 72 Hour Up To 15 Days; Future    Dyspnea on exertion  -Echocardiogram with normal EF and no valve abnormalities 11/2024  -Likely multifactorial given BMI and history of tobacco abuse  -Cannot rule out anginal equivalent  -May benefit from pulmonology referral with normal testing  Orders:    Stress Test With Myocardial Perfusion (2 Day); Future    Primary hypertension  -Blood pressure mildly elevated  -Blood pressures have been elevated at home per patient report  -Will increase Losartan to 50mg today  -Discussed to keep blood pressure log checking 1-2x/ day and bring log to follow up  -Discussed to limit salt in diet with educational handouts given  -Avoid NSAIDs  -Encouraged to lose weight to achieve healthy BMI         Mixed hyperlipidemia  -Tolerating statin therapy without difficulty  -Lipid panel 10/2024 noted LDL-83          Type 2 diabetes mellitus with hyperglycemia, without long-term current use of insulin  -A1c-8.8% on 4/2025  -Managed by PCP         Class 2 severe obesity due to excess calories with serious comorbidity and body mass index (BMI) of 39.0 to 39.9 in adult  -BMI-39  -Recommend diet and exercise for weight loss  -Currently utilizing Ozempic       Family history of coronary artery disease  -Reports significant history in family         Preventative Cardiology:   Tobacco  Cessation: N/A   Advance Care Planning: ACP discussion was held with the patient during this visit. Patient does not have an advance directive, declines further assistance.     Follow Up:   Return in about 5 weeks (around 7/15/2025) for Next scheduled follow up--MD for testing results.      Thank you for allowing me to participate in the care of your patient. Please to not hesitate to contact me with additional questions or concerns.     Ny Carrington, APRN

## 2025-06-09 NOTE — ASSESSMENT & PLAN NOTE
-Blood pressure mildly elevated  -Blood pressures have been elevated at home per patient report  -Will increase Losartan to 50mg today  -Discussed to keep blood pressure log checking 1-2x/ day and bring log to follow up  -Discussed to limit salt in diet with educational handouts given  -Avoid NSAIDs  -Encouraged to lose weight to achieve healthy BMI

## 2025-06-10 ENCOUNTER — OFFICE VISIT (OUTPATIENT)
Dept: CARDIOLOGY | Facility: CLINIC | Age: 65
End: 2025-06-10
Payer: COMMERCIAL

## 2025-06-10 ENCOUNTER — TELEPHONE (OUTPATIENT)
Dept: CARDIOLOGY | Facility: CLINIC | Age: 65
End: 2025-06-10

## 2025-06-10 ENCOUNTER — PATIENT ROUNDING (BHMG ONLY) (OUTPATIENT)
Dept: CARDIOLOGY | Facility: CLINIC | Age: 65
End: 2025-06-10
Payer: COMMERCIAL

## 2025-06-10 VITALS
DIASTOLIC BLOOD PRESSURE: 86 MMHG | OXYGEN SATURATION: 99 % | HEART RATE: 90 BPM | BODY MASS INDEX: 39.23 KG/M2 | SYSTOLIC BLOOD PRESSURE: 146 MMHG | HEIGHT: 63 IN | WEIGHT: 221.4 LBS | RESPIRATION RATE: 19 BRPM

## 2025-06-10 DIAGNOSIS — R07.89 CHEST PAIN, ATYPICAL: ICD-10-CM

## 2025-06-10 DIAGNOSIS — E78.2 MIXED HYPERLIPIDEMIA: ICD-10-CM

## 2025-06-10 DIAGNOSIS — E66.01 CLASS 2 SEVERE OBESITY DUE TO EXCESS CALORIES WITH SERIOUS COMORBIDITY AND BODY MASS INDEX (BMI) OF 39.0 TO 39.9 IN ADULT: Chronic | ICD-10-CM

## 2025-06-10 DIAGNOSIS — E11.65 TYPE 2 DIABETES MELLITUS WITH HYPERGLYCEMIA, WITHOUT LONG-TERM CURRENT USE OF INSULIN: ICD-10-CM

## 2025-06-10 DIAGNOSIS — Z09 HOSPITAL DISCHARGE FOLLOW-UP: Primary | ICD-10-CM

## 2025-06-10 DIAGNOSIS — R06.09 DYSPNEA ON EXERTION: ICD-10-CM

## 2025-06-10 DIAGNOSIS — Z82.49 FAMILY HISTORY OF CORONARY ARTERY DISEASE: ICD-10-CM

## 2025-06-10 DIAGNOSIS — I10 PRIMARY HYPERTENSION: ICD-10-CM

## 2025-06-10 DIAGNOSIS — R00.2 PALPITATIONS: ICD-10-CM

## 2025-06-10 DIAGNOSIS — E66.812 CLASS 2 SEVERE OBESITY DUE TO EXCESS CALORIES WITH SERIOUS COMORBIDITY AND BODY MASS INDEX (BMI) OF 39.0 TO 39.9 IN ADULT: Chronic | ICD-10-CM

## 2025-06-10 RX ORDER — LOSARTAN POTASSIUM 50 MG/1
50 TABLET ORAL DAILY
Qty: 30 TABLET | Refills: 5 | Status: SHIPPED | OUTPATIENT
Start: 2025-06-10 | End: 2025-12-07

## 2025-06-10 NOTE — PATIENT INSTRUCTIONS
Increase your Losartan to 50mg    Keep log of BP and bring to next follow up    Hospital will call to schedule stress test    Follow up in 5 weeks for testing results    Go to ER with any new, changing, worse symptoms

## 2025-06-10 NOTE — PROGRESS NOTES
[facility] welcome email and rounding message has been sent to patient via [place].

## 2025-06-10 NOTE — TELEPHONE ENCOUNTER
Patient called stating hat she is having a Mammogram done later this week and asked of this would be an issues with her monitor. Per Pt she is wearing the zio monitor, verified by asking if she has to change out patches or not. Pt was advised that she will have to call scheduling and see if that is an issue and may have to reschedule her Mammogram.

## 2025-06-16 ENCOUNTER — TELEPHONE (OUTPATIENT)
Dept: CARDIOLOGY | Facility: CLINIC | Age: 65
End: 2025-06-16
Payer: COMMERCIAL

## 2025-06-16 NOTE — TELEPHONE ENCOUNTER
There was nothing acutely concerning about her EKG.  She should complete testing and follow up as scheduled.    Electronically signed by ROSY Zarate, 06/16/25, 10:24 AM EDT.

## 2025-06-16 NOTE — TELEPHONE ENCOUNTER
Pt called stating that she read her EKG results and was wondering if this was anything to worry about. Please advise

## 2025-06-25 ENCOUNTER — HOSPITAL ENCOUNTER (OUTPATIENT)
Dept: NUCLEAR MEDICINE | Facility: HOSPITAL | Age: 65
Discharge: HOME | End: 2025-06-25
Payer: COMMERCIAL

## 2025-06-25 DIAGNOSIS — R07.89 CHEST PAIN, ATYPICAL: ICD-10-CM

## 2025-06-25 DIAGNOSIS — R06.09 DYSPNEA ON EXERTION: ICD-10-CM

## 2025-06-25 PROCEDURE — 78452 HT MUSCLE IMAGE SPECT MULT: CPT

## 2025-06-25 PROCEDURE — 34310000005 TECHNETIUM SESTAMIBI: Performed by: NURSE PRACTITIONER

## 2025-06-25 PROCEDURE — 25010000002 REGADENOSON 0.4 MG/5ML SOLUTION: Performed by: NURSE PRACTITIONER

## 2025-06-25 PROCEDURE — A9500 TC99M SESTAMIBI: HCPCS | Performed by: NURSE PRACTITIONER

## 2025-06-25 PROCEDURE — 93017 CV STRESS TEST TRACING ONLY: CPT

## 2025-06-25 RX ORDER — REGADENOSON 0.08 MG/ML
0.4 INJECTION, SOLUTION INTRAVENOUS
Status: COMPLETED | OUTPATIENT
Start: 2025-06-25 | End: 2025-06-25

## 2025-06-25 RX ADMIN — TECHNETIUM TC 99M SESTAMIBI 1 DOSE: 1 INJECTION INTRAVENOUS at 09:24

## 2025-06-25 RX ADMIN — REGADENOSON 0.4 MG: 0.08 INJECTION, SOLUTION INTRAVENOUS at 09:24

## 2025-06-26 ENCOUNTER — HOSPITAL ENCOUNTER (OUTPATIENT)
Dept: NUCLEAR MEDICINE | Facility: HOSPITAL | Age: 65
Discharge: HOME | End: 2025-06-26
Payer: COMMERCIAL

## 2025-06-26 PROCEDURE — A9500 TC99M SESTAMIBI: HCPCS | Performed by: NURSE PRACTITIONER

## 2025-06-26 PROCEDURE — 34310000005 TECHNETIUM SESTAMIBI: Performed by: NURSE PRACTITIONER

## 2025-06-26 RX ADMIN — TECHNETIUM TC 99M SESTAMIBI 1 DOSE: 1 INJECTION INTRAVENOUS at 08:13

## 2025-06-27 LAB
BH CV REST NUCLEAR ISOTOPE DOSE: 30 MCI
BH CV STRESS COMMENTS STAGE 1: NORMAL
BH CV STRESS DOSE REGADENOSON STAGE 1: 0.4
BH CV STRESS DURATION MIN STAGE 1: 0
BH CV STRESS DURATION SEC STAGE 1: 10
BH CV STRESS NUCLEAR ISOTOPE DOSE: 34 MCI
BH CV STRESS PROTOCOL 1: NORMAL
BH CV STRESS RECOVERY BP: NORMAL MMHG
BH CV STRESS RECOVERY HR: 99 BPM
BH CV STRESS STAGE 1: 1
MAXIMAL PREDICTED HEART RATE: 158 BPM
PERCENT MAX PREDICTED HR: 68.35 %
SPECT HRT GATED+EF W RNC IV: 74 %
STRESS BASELINE BP: NORMAL MMHG
STRESS BASELINE HR: 85 BPM
STRESS PERCENT HR: 80 %
STRESS POST PEAK BP: NORMAL MMHG
STRESS POST PEAK HR: 108 BPM
STRESS TARGET HR: 134 BPM

## 2025-07-14 ENCOUNTER — OFFICE VISIT (OUTPATIENT)
Dept: FAMILY MEDICINE CLINIC | Facility: CLINIC | Age: 65
End: 2025-07-14
Payer: COMMERCIAL

## 2025-07-14 VITALS
DIASTOLIC BLOOD PRESSURE: 76 MMHG | WEIGHT: 221.4 LBS | HEIGHT: 63 IN | HEART RATE: 92 BPM | SYSTOLIC BLOOD PRESSURE: 118 MMHG | BODY MASS INDEX: 39.23 KG/M2 | RESPIRATION RATE: 16 BRPM | OXYGEN SATURATION: 97 %

## 2025-07-14 DIAGNOSIS — I10 PRIMARY HYPERTENSION: ICD-10-CM

## 2025-07-14 DIAGNOSIS — R53.83 FATIGUE, UNSPECIFIED TYPE: ICD-10-CM

## 2025-07-14 DIAGNOSIS — E61.1 IRON DEFICIENCY: ICD-10-CM

## 2025-07-14 DIAGNOSIS — E11.65 TYPE 2 DIABETES MELLITUS WITH HYPERGLYCEMIA, WITHOUT LONG-TERM CURRENT USE OF INSULIN: Primary | ICD-10-CM

## 2025-07-14 DIAGNOSIS — E53.8 VITAMIN B12 DEFICIENCY: ICD-10-CM

## 2025-07-14 DIAGNOSIS — E55.9 VITAMIN D DEFICIENCY: ICD-10-CM

## 2025-07-14 DIAGNOSIS — R06.83 SNORING: ICD-10-CM

## 2025-07-14 DIAGNOSIS — E78.2 MIXED HYPERLIPIDEMIA: ICD-10-CM

## 2025-07-14 DIAGNOSIS — I25.10 CORONARY ARTERY DISEASE INVOLVING NATIVE HEART WITHOUT ANGINA PECTORIS, UNSPECIFIED VESSEL OR LESION TYPE: ICD-10-CM

## 2025-07-14 LAB
EXPIRATION DATE: ABNORMAL
HBA1C MFR BLD: 9.6 % (ref 4.5–5.7)
Lab: ABNORMAL

## 2025-07-14 PROCEDURE — 99396 PREV VISIT EST AGE 40-64: CPT | Performed by: NURSE PRACTITIONER

## 2025-07-14 PROCEDURE — 83036 HEMOGLOBIN GLYCOSYLATED A1C: CPT | Performed by: NURSE PRACTITIONER

## 2025-07-14 PROCEDURE — 99214 OFFICE O/P EST MOD 30 MIN: CPT | Performed by: NURSE PRACTITIONER

## 2025-07-14 RX ORDER — SEMAGLUTIDE 2.68 MG/ML
2 INJECTION, SOLUTION SUBCUTANEOUS WEEKLY
Qty: 3 ML | Refills: 1 | OUTPATIENT
Start: 2025-07-14

## 2025-07-14 RX ORDER — FENOFIBRATE 145 MG/1
145 TABLET, FILM COATED ORAL DAILY
Qty: 90 TABLET | Refills: 1 | Status: SHIPPED | OUTPATIENT
Start: 2025-07-14

## 2025-07-14 RX ORDER — SEMAGLUTIDE 2.68 MG/ML
2 INJECTION, SOLUTION SUBCUTANEOUS WEEKLY
Qty: 3 ML | Refills: 2 | Status: SHIPPED | OUTPATIENT
Start: 2025-07-14

## 2025-07-14 NOTE — PROGRESS NOTES
"                      Established Patient        Chief Complaint:   Chief Complaint   Patient presents with    Annual Exam     Pt is here for annual physical          History of Present Illness  The patient is a 64-year-old female presenting for fatigue, diabetes management, supraventricular tachycardia, and cholesterol management.    She reports persistent fatigue, possibly related to heartburn, managed with Pepcid. Despite normal stress test results, fatigue persists. She suspects sleep apnea due to snoring and dry mouth but has not undergone a sleep study. Nearing the end of her vitamin D course, she is considering B12 supplementation.    Significant stress has led to poor dietary habits. She is a stress eater and describes life as challenging. Currently on Ozempic and Jardiance for diabetes, she needs an Ozempic refill. Her A1c has increased, and she admits to poor dietary adherence.    She is on fenofibrate and simvastatin for cholesterol management and needs a fenofibrate refill.    Recent ER visit and cardiology appointment on 06/23/2025. Losartan dosage was increased by a nurse practitioner. Holter monitor study showed intermittent SVT episodes. Considering beta blocker use, which may require losartan dosage adjustment.    Social History:  Marital Status:   Diet: Poor dietary habits due to stress  Sleep: Snoring and dry mouth, no sleep study      Subjective     The following portions of the patient's history were reviewed and updated as appropriate: allergies, current medications, past family history, past medical history, past social history, past surgical history and problem list.    ALLERGIES  No Known Allergies    Review of Systems  As above.    Objective     Vital Signs:   /76   Pulse 92   Resp 16   Ht 160 cm (63\")   Wt 100 kg (221 lb 6.4 oz)   SpO2 97%   BMI 39.22 kg/m²                Physical Exam   Physical Exam  Vitals and nursing note reviewed.   HENT:      Head: Normocephalic. "      Right Ear: Tympanic membrane normal.      Left Ear: Tympanic membrane normal.      Nose: Nose normal.      Mouth/Throat:      Mouth: Mucous membranes are moist.   Eyes:      Extraocular Movements: Extraocular movements intact.      Conjunctiva/sclera: Conjunctivae normal.      Pupils: Pupils are equal, round, and reactive to light.   Neck:      Thyroid: No thyroid mass, thyromegaly or thyroid tenderness.   Cardiovascular:      Rate and Rhythm: Normal rate and regular rhythm.      Pulses: Normal pulses.      Heart sounds: Normal heart sounds.   Pulmonary:      Effort: Pulmonary effort is normal.      Breath sounds: Normal breath sounds.   Abdominal:      General: Bowel sounds are normal. There is no distension.      Palpations: Abdomen is soft. There is no mass.      Tenderness: There is no abdominal tenderness. There is no right CVA tenderness, left CVA tenderness or guarding.   Musculoskeletal:         General: Normal range of motion.      Cervical back: Normal range of motion.   Skin:     General: Skin is warm and dry.      Capillary Refill: Capillary refill takes less than 2 seconds.   Neurological:      Mental Status: She is alert and oriented to person, place, and time.   Psychiatric:         Mood and Affect: Mood normal.         Behavior: Behavior normal.         Assessment and Plan      Assessment/Plan:   Diagnoses and all orders for this visit:    1. Type 2 diabetes mellitus with hyperglycemia, without long-term current use of insulin (Primary)  -     POC Glycosylated Hemoglobin (Hb A1C)  -     empagliflozin (Jardiance) 25 MG tablet tablet; Take 1 tablet by mouth Daily.  Dispense: 90 tablet; Refill: 1  -     Semaglutide, 2 MG/DOSE, (Ozempic, 2 MG/DOSE,) 8 MG/3ML solution pen-injector; Inject 2 mg under the skin into the appropriate area as directed 1 (One) Time Per Week.  Dispense: 3 mL; Refill: 2    2. Vitamin D deficiency  -     Vitamin D,25-Hydroxy    3. Fatigue, unspecified type  -     Ambulatory  Referral to Sleep Medicine    4. Coronary artery disease involving native heart without angina pectoris, unspecified vessel or lesion type    5. Mixed hyperlipidemia  -     Lipid Panel  -     fenofibrate (TRICOR) 145 MG tablet; Take 1 tablet by mouth Daily.  Dispense: 90 tablet; Refill: 1    6. Primary hypertension    7. Iron deficiency  -     Iron Profile w/o Ferritin  -     Ferritin    8. Snoring    9. Vitamin B12 deficiency  -     Vitamin B12    Risks, benefits, and potential side effects of current/new medications reviewed with patient.  Patient voiced understanding and wished to proceed with treatment.    I will contact patient regarding test results and provide instructions regarding any necessary changes in plan of care.    Encouraged ambulatory glucose monitoring. Patient to call office or RTC for glucose levels consistently greater than 300 or poorly controlled with current medication regimen.     Discussed need for reduction in sodium/salt/caffeine intake; improve sleep habits as able; inc formal CV exercise program with goal of vigorous activity most, if not all, days of the week.     Ambulatory blood pressure monitoring encouraged prior to taking medication daily and as needed.    Contact office for symptomatic HTN or consistently uncontrolled HTN.     Patient to increase dietary intake of vegetables, fruits, nuts, whole grains and fish. Decrease dietary intake of carbs, processed foods such as lunch meats, saturated fats, sugary beverages.     Increase exercise regimen as tolerated toward a goal of 120-150 minutes/week.     Patient to present to ED for chest pain, confusion, vision disturbance.     Patient was encouraged to keep me informed of any acute changes, lack of improvement, or any new concerning symptoms.      Assessment & Plan  Fatigue  - Possible untreated sleep apnea exacerbating blood sugar and blood pressure  - Referral to sleep medicine for sleep study  - Consider oral B complex  vitamin  - Order blood work for vitamin D, B12, and iron levels    Diabetes Mellitus  - Increased A1c  - Increase Jardiance dosage from 10 mg to 25 mg  - Send Ozempic prescription to pharmacy  - Consider switch to Mounjaro if combination therapy fails    Supraventricular Tachycardia  - Holter monitor study revealed intermittent SVT episodes  - Consider beta blocker (carvedilol or metoprolol)  - Discuss beta blocker option with cardiologist; may need to reduce losartan dosage    Cholesterol Management  - Send fenofibrate prescription to pharmacy  - Order blood work for cholesterol levels  - Confirmed patient still taking simvastatin    Follow-up  - Follow up in 3 months    Discussion Summary:  Discussed plan of care in detail with pt today; pt verb understanding and agrees.      I have reviewed and updated all copied forward information, as appropriate.  I attest to the accuracy and relevance of any unchanged information.      Follow up:  Return in about 3 months (around 10/14/2025) for Next scheduled follow up.     Patient Education:  There are no Patient Instructions on file for this visit.    Patient or patient representative verbalized consent for the use of Ambient Listening during the visit with  ROSY Cruz for chart documentation. 7/14/2025  09:29 EDT    ROSY Cruz  07/14/25  09:29 EDT          Please note that portions of this note may have been completed with a voice recognition program.

## 2025-07-15 LAB
25(OH)D3+25(OH)D2 SERPL-MCNC: 45.7 NG/ML (ref 30–100)
CHOLEST SERPL-MCNC: 184 MG/DL (ref 0–200)
FERRITIN SERPL-MCNC: 261 NG/ML (ref 13–150)
HDLC SERPL-MCNC: 47 MG/DL (ref 40–60)
IRON SATN MFR SERPL: 12 % (ref 20–50)
IRON SERPL-MCNC: 50 MCG/DL (ref 37–145)
LDLC SERPL CALC-MCNC: 111 MG/DL (ref 0–100)
TIBC SERPL-MCNC: 429 MCG/DL
TRIGL SERPL-MCNC: 149 MG/DL (ref 0–150)
UIBC SERPL-MCNC: 379 MCG/DL (ref 112–346)
VIT B12 SERPL-MCNC: 435 PG/ML (ref 211–946)
VLDLC SERPL CALC-MCNC: 26 MG/DL (ref 5–40)

## 2025-07-22 ENCOUNTER — OFFICE VISIT (OUTPATIENT)
Dept: PULMONOLOGY | Facility: CLINIC | Age: 65
End: 2025-07-22
Payer: COMMERCIAL

## 2025-07-22 VITALS
HEIGHT: 63 IN | WEIGHT: 223 LBS | BODY MASS INDEX: 39.51 KG/M2 | DIASTOLIC BLOOD PRESSURE: 78 MMHG | HEART RATE: 89 BPM | SYSTOLIC BLOOD PRESSURE: 138 MMHG | OXYGEN SATURATION: 98 % | RESPIRATION RATE: 18 BRPM

## 2025-07-22 DIAGNOSIS — R06.83 SNORING: Primary | ICD-10-CM

## 2025-07-22 DIAGNOSIS — E66.812 OBESITY, CLASS II, BMI 35-39.9, ISOLATED (SEE ACTUAL BMI): ICD-10-CM

## 2025-07-22 DIAGNOSIS — G47.52 DREAM ENACTMENT BEHAVIOR: ICD-10-CM

## 2025-07-22 DIAGNOSIS — G47.19 EXCESSIVE DAYTIME SLEEPINESS: ICD-10-CM

## 2025-07-22 DIAGNOSIS — G47.8 SLEEP TALKING: ICD-10-CM

## 2025-07-22 PROCEDURE — 99204 OFFICE O/P NEW MOD 45 MIN: CPT | Performed by: INTERNAL MEDICINE

## 2025-07-22 NOTE — PROGRESS NOTES
CONSULT NOTE    Requested by:   Sharmila Oconnell APRN Humphrey, Tabitha, APRN      Chief Complaint   Patient presents with    Consult    Sleeping Problem       Subjective:  Silva Maldonado is a 64 y.o. female.   Patient came in today for evaluation of possible sleep apnea. Patient endorses no snoring. she also says that she has been tired and has fatigue during the day, for the past few years.     The patient's family notes that she has occasional pauses in the breathing & she occasionally wakes up gasping for breath.     The patient says that she rarely gets restful night sleep and her quality has diminished considerably. she does have a tendency to feel sleepy while watching TV and reading a book.      The patient has been told that she occasionally talks in her sleep. she also occasionally acts out her dreams.    she is complaining of occasional headaches. There is known family history of sleep apnea, in her older brother.    Patient's sleep schedule was reviewed.     her Epsworth Sleepiness score was ?? /24.     Patient suffers from hypertension & diabetes.      she drinks 5-6 cups/cans of caffeinated drinks per day.      The following portions of the patient's history were reviewed and updated as appropriate: allergies, current medications, past family history, past medical history, past social history, and past surgical history.    Review of Systems   Constitutional:  Positive for fatigue.   HENT:  Positive for sinus pressure. Negative for sneezing and sore throat.    Respiratory:  Negative for cough, chest tightness, shortness of breath and wheezing.    Cardiovascular:  Negative for palpitations (sometimes) and leg swelling.   Psychiatric/Behavioral:  Positive for sleep disturbance.    All other systems reviewed and are negative.      Past Medical History:   Diagnosis Date    Cholelithiasis     Removed in 1996    Colon polyp 12/2019    Recheck i 3 years    Diabetes mellitus 2017    type II     "Diverticulosis 2007    Elevated cholesterol     Gestational hypertension 1999    Hepatomegaly 2024    Hypertension     Kidney stone 26    Multiple gestation     1 still born, 2 live burths    Obesity     Ovarian cyst 1999    Pelvic prolapse 2001    Preeclampsia 1999    UTI (urinary tract infection) 2024    Varicella 1968       Social History     Tobacco Use    Smoking status: Former     Current packs/day: 0.00     Average packs/day: 1.5 packs/day for 25.6 years (38.4 ttl pk-yrs)     Types: Cigarettes     Start date: 1986     Quit date: 2012     Years since quittin.5     Passive exposure: Past    Smokeless tobacco: Never   Substance Use Topics    Alcohol use: Never         Objective:  Visit Vitals  /78   Pulse 89   Resp 18   Ht 160 cm (63\") Comment: pt reported   Wt 101 kg (223 lb)   SpO2 98%   BMI 39.50 kg/m²       BMI Readings from Last 8 Encounters:   25 39.50 kg/m²   25 39.22 kg/m²   06/10/25 39.22 kg/m²   25 38.97 kg/m²   25 39.96 kg/m²   25 40.57 kg/m²   24 39.86 kg/m²   24 39.82 kg/m²       Physical Exam  Vitals reviewed.   Constitutional:       Appearance: She is well-developed.   HENT:      Head: Atraumatic.      Mouth/Throat:      Mouth: Mucous membranes are moist.      Comments: Oropharynx was crowded.  Eyes:      Pupils: Pupils are equal, round, and reactive to light.   Neck:      Thyroid: No thyromegaly.      Vascular: No JVD.      Trachea: No tracheal deviation.   Cardiovascular:      Rate and Rhythm: Normal rate and regular rhythm.   Pulmonary:      Effort: Pulmonary effort is normal. No respiratory distress.      Breath sounds: Normal breath sounds. No wheezing.   Musculoskeletal:      Right lower leg: No edema.      Left lower leg: No edema.      Comments: Right index finger amputated.  Gait was normal.   Skin:     General: Skin is warm and dry.   Neurological:      Mental Status: She is alert and oriented to person, " place, and time.           Assessment/Plan:  Diagnoses and all orders for this visit:    1. Snoring (Primary)  -     Polysomnography 4 or More Parameters; Future    2. Excessive daytime sleepiness  -     Polysomnography 4 or More Parameters; Future    3. Obesity, Class II, BMI 35-39.9, isolated (see actual BMI)  -     Polysomnography 4 or More Parameters; Future    4. Sleep talking  -     Polysomnography 4 or More Parameters; Future    5. Dream enactment behavior  -     Polysomnography 4 or More Parameters; Future        Return in about 5 months (around 12/11/2025) for Erik/Dlicia Leon.    DISCUSSION(if any):  Laboratory workup also showed   Lab Results   Component Value Date    HGB 13.5 06/05/2025    HGB 14.2 12/19/2024    HGB 13.6 12/05/2024   ,   Lab Results   Component Value Date    HCT 39.6 06/05/2025    HCT 42.9 12/19/2024    HCT 41.1 12/05/2024       Lab Results   Component Value Date    EOSABS 0.39 06/05/2025    EOSABS 0.46 12/19/2024    EOSABS 0.47 12/05/2024    &  Lab Results   Component Value Date    AFPTM 133 08/08/2023   ,  Laboratory workup also showed   Lab Results   Component Value Date    CO2 19.3 (L) 06/05/2025    CO2 18.4 (L) 11/17/2024    CO2 19.8 (L) 11/15/2024   ,   Lab Results   Component Value Date    HGBA1C 9.6 (A) 07/14/2025    HGBA1C 8.8 (A) 04/25/2025    HGBA1C 8.5 (A) 01/24/2025   ,   Lab Results   Component Value Date    TSH 1.380 04/25/2025    TSH 1.520 11/29/2022     ===========================  ===========================    Sleep questionnaire was provided to the patient    The pathophysiology of sleep apnea was discussed, with her.     We will encourage her to schedule the sleep study soon.     The patient will definitely need to be considered for an in lab study due to dream enactment & sleep talking among other reasons.  It should be noted that a home sleep study is likely to underestimate the true AHI and is unable to assess sleep stages and abnormal sleep behaviors  etc. The patient has understood.    The patient is agreeable to try CPAP/BiPAP, if needed.     Patient was educated on good sleep hygiene measures and voiced understanding of the same.     Patient was given reading material regarding sleep apnea.      Dictated utilizing Dragon dictation.    This document was electronically signed by Ender Patten MD on 07/22/25 at 10:44 EDT

## 2025-07-23 ENCOUNTER — PATIENT ROUNDING (BHMG ONLY) (OUTPATIENT)
Dept: PULMONOLOGY | Facility: CLINIC | Age: 65
End: 2025-07-23
Payer: COMMERCIAL

## 2025-07-23 ENCOUNTER — OFFICE VISIT (OUTPATIENT)
Dept: CARDIOLOGY | Facility: CLINIC | Age: 65
End: 2025-07-23
Payer: COMMERCIAL

## 2025-07-23 VITALS
HEIGHT: 63 IN | WEIGHT: 223 LBS | DIASTOLIC BLOOD PRESSURE: 104 MMHG | BODY MASS INDEX: 39.51 KG/M2 | OXYGEN SATURATION: 99 % | SYSTOLIC BLOOD PRESSURE: 160 MMHG | HEART RATE: 92 BPM

## 2025-07-23 DIAGNOSIS — E66.01 CLASS 2 SEVERE OBESITY DUE TO EXCESS CALORIES WITH SERIOUS COMORBIDITY AND BODY MASS INDEX (BMI) OF 39.0 TO 39.9 IN ADULT: Chronic | ICD-10-CM

## 2025-07-23 DIAGNOSIS — E66.812 CLASS 2 SEVERE OBESITY DUE TO EXCESS CALORIES WITH SERIOUS COMORBIDITY AND BODY MASS INDEX (BMI) OF 39.0 TO 39.9 IN ADULT: Chronic | ICD-10-CM

## 2025-07-23 DIAGNOSIS — E11.65 TYPE 2 DIABETES MELLITUS WITH HYPERGLYCEMIA, WITHOUT LONG-TERM CURRENT USE OF INSULIN: ICD-10-CM

## 2025-07-23 DIAGNOSIS — I10 PRIMARY HYPERTENSION: Primary | ICD-10-CM

## 2025-07-23 DIAGNOSIS — R07.2 PRECORDIAL PAIN: ICD-10-CM

## 2025-07-23 DIAGNOSIS — E78.5 DYSLIPIDEMIA: ICD-10-CM

## 2025-07-23 PROCEDURE — 99214 OFFICE O/P EST MOD 30 MIN: CPT | Performed by: INTERNAL MEDICINE

## 2025-07-23 RX ORDER — ASPIRIN 81 MG/1
81 TABLET ORAL DAILY
Qty: 90 TABLET | Refills: 3 | Status: SHIPPED | OUTPATIENT
Start: 2025-07-23

## 2025-07-23 RX ORDER — DILTIAZEM HYDROCHLORIDE 240 MG/1
240 CAPSULE, COATED, EXTENDED RELEASE ORAL DAILY
Qty: 90 CAPSULE | Refills: 3 | Status: SHIPPED | OUTPATIENT
Start: 2025-07-23

## 2025-07-23 NOTE — PROGRESS NOTES
Subjective:     Encounter Date:07/23/2025      Patient ID: Silva Maldonado is a 64 y.o. female.    Chief Complaint: Chest pain  HPI  This is a 64-year-old female patient who presents to cardiology clinic for evaluation of chest pain.  The patient had a normal vasodilator nuclear stress test showing no evidence of ischemia or infarction.  The calculated ejection fraction was normal.  The patient also wore a 14-day cardiac monitor showing 38 episodes of nonsustained SVT.  Her fastest heart rate was 170 bpm.  The longest episode was 15 seconds which was just shy of the arbitrary 20 second  cutoff for defining sustained arrhythmia.  She had no atrial fibrillation or atrial flutter.  She is a non-smoker.  She reports that her blood pressures have been somewhat higher than normal at home.  She reports compliance with her medications with no perceived side effects.  A previous recent echocardiogram showed no significant abnormalities.  The following portions of the patient's history were reviewed and updated as appropriate: allergies, current medications, past family history, past medical history, past social history, past surgical history and problem  Review of Systems   Constitutional: Negative for chills, diaphoresis, fever, malaise/fatigue, weight gain and weight loss.   HENT:  Negative for ear discharge, hearing loss, hoarse voice and nosebleeds.    Eyes:  Negative for discharge, double vision, pain and photophobia.   Cardiovascular:  Positive for chest pain. Negative for claudication, cyanosis, dyspnea on exertion, irregular heartbeat, leg swelling, near-syncope, orthopnea, palpitations, paroxysmal nocturnal dyspnea and syncope.   Respiratory:  Negative for cough, hemoptysis, sputum production and wheezing.    Endocrine: Negative for cold intolerance, heat intolerance, polydipsia, polyphagia and polyuria.   Hematologic/Lymphatic: Negative for adenopathy and bleeding problem. Does not bruise/bleed easily.    Skin:  Negative for color change, flushing, itching and rash.   Musculoskeletal:  Negative for muscle cramps, muscle weakness, myalgias and stiffness.   Gastrointestinal:  Negative for abdominal pain, diarrhea, hematemesis, hematochezia, nausea and vomiting.   Genitourinary:  Negative for dysuria, frequency and nocturia.   Neurological:  Negative for focal weakness, loss of balance, numbness, paresthesias and seizures.   Psychiatric/Behavioral:  Negative for altered mental status, hallucinations and suicidal ideas.    Allergic/Immunologic: Negative for HIV exposure, hives and persistent infections.           Current Outpatient Medications:     Blood Glucose Monitoring Suppl kit, 1 each 4 (Four) Times a Day., Disp: 1 each, Rfl: 0    empagliflozin (Jardiance) 25 MG tablet tablet, Take 1 tablet by mouth Daily., Disp: 90 tablet, Rfl: 1    fenofibrate (TRICOR) 145 MG tablet, Take 1 tablet by mouth Daily., Disp: 90 tablet, Rfl: 1    glucose blood (Glucose Meter Test) test strip, Use as instructed, Disp: 200 each, Rfl: 12    losartan (COZAAR) 50 MG tablet, Take 1 tablet by mouth Daily for 180 days., Disp: 30 tablet, Rfl: 5    Magnesium 250 MG tablet, Take 1 tablet by mouth At Night As Needed., Disp: , Rfl:     Semaglutide, 2 MG/DOSE, (Ozempic, 2 MG/DOSE,) 8 MG/3ML solution pen-injector, Inject 2 mg under the skin into the appropriate area as directed 1 (One) Time Per Week., Disp: 3 mL, Rfl: 2    simvastatin (ZOCOR) 20 MG tablet, Take 1 tablet by mouth Every Evening., Disp: 30 tablet, Rfl: 3    aspirin 81 MG EC tablet, Take 1 tablet by mouth Daily., Disp: 90 tablet, Rfl: 3    dilTIAZem CD (CARDIZEM CD) 240 MG 24 hr capsule, Take 1 capsule by mouth Daily., Disp: 90 capsule, Rfl: 3    Objective:   Vitals and nursing note reviewed.   Constitutional:       Appearance: Healthy appearance. Not in distress.   Neck:      Vascular: No JVR. JVD normal.   Pulmonary:      Effort: Pulmonary effort is normal.      Breath sounds: Normal  "breath sounds. No wheezing. No rhonchi. No rales.   Chest:      Chest wall: Not tender to palpatation.   Cardiovascular:      PMI at left midclavicular line. Normal rate. Regular rhythm. Normal S1. Normal S2.       Murmurs: There is no murmur.      No gallop.  No click. No rub.   Pulses:     Intact distal pulses.   Edema:     Peripheral edema absent.   Abdominal:      General: Bowel sounds are normal.      Palpations: Abdomen is soft.      Tenderness: There is no abdominal tenderness.   Musculoskeletal: Normal range of motion.         General: No tenderness. Skin:     General: Skin is warm and dry.   Neurological:      General: No focal deficit present.      Mental Status: Alert and oriented to person, place and time.       Blood pressure (!) 160/104, pulse 92, height 160 cm (63\"), weight 101 kg (223 lb), SpO2 99%.   Lab Review:     Assessment:       1. Primary hypertension  Poor blood pressure control.  - aspirin 81 MG EC tablet; Take 1 tablet by mouth Daily.  Dispense: 90 tablet; Refill: 3    2. Class 2 severe obesity due to excess calories with serious comorbidity and body mass index (BMI) of 39.0 to 39.9 in adult  BMI is just under 40.  The obesity pattern is central.  The metabolic syndrome is present.    3. Type 2 diabetes mellitus with hyperglycemia, without long-term current use of insulin  Typical obesity related insulin resistance due to excessive adiposity.    4. Dyslipidemia  Tolerating combination statin based cholesterol-lowering therapy (Zocor 20) and fenofibrate therapy.    5. Precordial pain  Noncardiac chest pain.  No evidence of ischemic heart disease.  Possibly related to arrhythmia.    Procedures    Plan:     Advance Care Planning   ACP discussion was held with the patient during this visit. Patient has an advance directive (not in EMR), copy requested.     I have recommended starting Cardizem  mg orally once per day.    This may necessitate changing her Zocor to a different statin which " does not have the typical drug-drug interaction of Zocor and Cardizem CD.  Generally speaking, Zocor at 10 mg or less is acceptable to use in combination with Cardizem CD.    The importance of diet exercise and weight management cannot be over emphasized.    The patient has been reassured regarding the benign nature of her stress test results.    I have recommended referral to electrophysiology regarding an opinion on possible beta pathway ablation.

## 2025-08-06 ENCOUNTER — HOSPITAL ENCOUNTER (OUTPATIENT)
Dept: SLEEP MEDICINE | Facility: HOSPITAL | Age: 65
Discharge: HOME | End: 2025-08-06
Admitting: INTERNAL MEDICINE
Payer: COMMERCIAL

## 2025-08-06 DIAGNOSIS — G47.52 DREAM ENACTMENT BEHAVIOR: ICD-10-CM

## 2025-08-06 DIAGNOSIS — E66.812 OBESITY, CLASS II, BMI 35-39.9, ISOLATED (SEE ACTUAL BMI): ICD-10-CM

## 2025-08-06 DIAGNOSIS — G47.8 SLEEP TALKING: ICD-10-CM

## 2025-08-06 DIAGNOSIS — G47.19 EXCESSIVE DAYTIME SLEEPINESS: ICD-10-CM

## 2025-08-06 DIAGNOSIS — R06.83 SNORING: ICD-10-CM

## 2025-08-06 PROCEDURE — 95810 POLYSOM 6/> YRS 4/> PARAM: CPT

## 2025-08-26 DIAGNOSIS — E78.2 MIXED HYPERLIPIDEMIA: ICD-10-CM

## 2025-08-26 RX ORDER — SIMVASTATIN 20 MG
20 TABLET ORAL EVERY EVENING
Qty: 90 TABLET | Refills: 2 | Status: SHIPPED | OUTPATIENT
Start: 2025-08-26

## (undated) DEVICE — LUBE JELLY PK/2.75GM STRL BX/144

## (undated) DEVICE — Device

## (undated) DEVICE — ENDOSCOPY PORT CONNECTOR FOR OLYMPUS® SCOPES: Brand: ERBE

## (undated) DEVICE — VLV SXN AIR/H2O ORCAPOD3 1P/U STRL

## (undated) DEVICE — QUICK CATCH IN-LINE SUCTION POLYP TRAP IS USED FOR SUCTION RETRIEVAL OF ENDOSCOPICALLY REMOVED POLYPS.

## (undated) DEVICE — HYBRID TUBING/CAP SET FOR OLYMPUS® SCOPES: Brand: ERBE